# Patient Record
Sex: FEMALE | Race: WHITE | NOT HISPANIC OR LATINO
[De-identification: names, ages, dates, MRNs, and addresses within clinical notes are randomized per-mention and may not be internally consistent; named-entity substitution may affect disease eponyms.]

---

## 2017-03-12 ENCOUNTER — APPOINTMENT (OUTPATIENT)
Dept: MRI IMAGING | Facility: CLINIC | Age: 77
End: 2017-03-12

## 2017-03-12 ENCOUNTER — OUTPATIENT (OUTPATIENT)
Dept: OUTPATIENT SERVICES | Facility: HOSPITAL | Age: 77
LOS: 1 days | End: 2017-03-12
Payer: COMMERCIAL

## 2017-03-12 DIAGNOSIS — R25.1 TREMOR, UNSPECIFIED: ICD-10-CM

## 2017-03-12 DIAGNOSIS — R27.0 ATAXIA, UNSPECIFIED: ICD-10-CM

## 2017-03-12 PROCEDURE — 70551 MRI BRAIN STEM W/O DYE: CPT

## 2017-03-20 ENCOUNTER — OUTPATIENT (OUTPATIENT)
Dept: OUTPATIENT SERVICES | Facility: HOSPITAL | Age: 77
LOS: 1 days | End: 2017-03-20
Payer: COMMERCIAL

## 2017-03-20 ENCOUNTER — APPOINTMENT (OUTPATIENT)
Dept: ULTRASOUND IMAGING | Facility: CLINIC | Age: 77
End: 2017-03-20

## 2017-03-20 DIAGNOSIS — Z00.8 ENCOUNTER FOR OTHER GENERAL EXAMINATION: ICD-10-CM

## 2017-03-20 PROCEDURE — 93975 VASCULAR STUDY: CPT

## 2017-06-12 NOTE — ASU PATIENT PROFILE, ADULT - PMH
Glaucoma    HLD (hyperlipidemia)    HTN (hypertension)    Hypothyroid    LBP (low back pain)    Osteoarthritis of hip  right

## 2017-06-13 ENCOUNTER — OUTPATIENT (OUTPATIENT)
Dept: OUTPATIENT SERVICES | Facility: HOSPITAL | Age: 77
LOS: 1 days | End: 2017-06-13
Payer: MEDICARE

## 2017-06-13 ENCOUNTER — TRANSCRIPTION ENCOUNTER (OUTPATIENT)
Age: 77
End: 2017-06-13

## 2017-06-13 VITALS
RESPIRATION RATE: 16 BRPM | SYSTOLIC BLOOD PRESSURE: 116 MMHG | HEART RATE: 94 BPM | OXYGEN SATURATION: 98 % | DIASTOLIC BLOOD PRESSURE: 51 MMHG

## 2017-06-13 VITALS
SYSTOLIC BLOOD PRESSURE: 128 MMHG | HEART RATE: 79 BPM | WEIGHT: 171.08 LBS | HEIGHT: 62 IN | TEMPERATURE: 99 F | DIASTOLIC BLOOD PRESSURE: 70 MMHG | OXYGEN SATURATION: 96 % | RESPIRATION RATE: 16 BRPM

## 2017-06-13 DIAGNOSIS — Z96.641 PRESENCE OF RIGHT ARTIFICIAL HIP JOINT: Chronic | ICD-10-CM

## 2017-06-13 DIAGNOSIS — H25.21 AGE-RELATED CATARACT, MORGAGNIAN TYPE, RIGHT EYE: ICD-10-CM

## 2017-06-13 PROCEDURE — 66984 XCAPSL CTRC RMVL W/O ECP: CPT | Mod: RT

## 2017-06-13 PROCEDURE — C1780: CPT

## 2017-06-22 ENCOUNTER — APPOINTMENT (OUTPATIENT)
Dept: ORTHOPEDIC SURGERY | Facility: CLINIC | Age: 77
End: 2017-06-22

## 2017-06-22 VITALS
HEIGHT: 61 IN | HEART RATE: 89 BPM | DIASTOLIC BLOOD PRESSURE: 89 MMHG | WEIGHT: 170 LBS | SYSTOLIC BLOOD PRESSURE: 141 MMHG | BODY MASS INDEX: 32.1 KG/M2

## 2017-06-22 DIAGNOSIS — Z96.641 PRESENCE OF RIGHT ARTIFICIAL HIP JOINT: ICD-10-CM

## 2017-06-22 DIAGNOSIS — M54.16 RADICULOPATHY, LUMBAR REGION: ICD-10-CM

## 2017-06-22 RX ORDER — TRETINOIN 0.2 MG/G
0.02 CREAM TOPICAL DAILY
Qty: 40 | Refills: 0 | Status: ACTIVE | COMMUNITY
Start: 2017-06-22 | End: 1900-01-01

## 2017-07-24 ENCOUNTER — APPOINTMENT (OUTPATIENT)
Dept: PEDIATRIC ALLERGY IMMUNOLOGY | Facility: CLINIC | Age: 77
End: 2017-07-24

## 2019-04-08 ENCOUNTER — TRANSCRIPTION ENCOUNTER (OUTPATIENT)
Age: 79
End: 2019-04-08

## 2019-04-09 ENCOUNTER — OUTPATIENT (OUTPATIENT)
Dept: OUTPATIENT SERVICES | Facility: HOSPITAL | Age: 79
LOS: 1 days | End: 2019-04-09
Payer: MEDICARE

## 2019-04-09 VITALS
WEIGHT: 161.38 LBS | DIASTOLIC BLOOD PRESSURE: 66 MMHG | RESPIRATION RATE: 17 BRPM | OXYGEN SATURATION: 95 % | SYSTOLIC BLOOD PRESSURE: 125 MMHG | HEART RATE: 72 BPM | HEIGHT: 61 IN | TEMPERATURE: 98 F

## 2019-04-09 VITALS
HEART RATE: 78 BPM | OXYGEN SATURATION: 98 % | DIASTOLIC BLOOD PRESSURE: 65 MMHG | RESPIRATION RATE: 17 BRPM | SYSTOLIC BLOOD PRESSURE: 127 MMHG

## 2019-04-09 DIAGNOSIS — H25.22 AGE-RELATED CATARACT, MORGAGNIAN TYPE, LEFT EYE: ICD-10-CM

## 2019-04-09 DIAGNOSIS — Z96.641 PRESENCE OF RIGHT ARTIFICIAL HIP JOINT: Chronic | ICD-10-CM

## 2019-04-09 DIAGNOSIS — Z98.41 CATARACT EXTRACTION STATUS, RIGHT EYE: Chronic | ICD-10-CM

## 2019-04-09 PROCEDURE — V2632: CPT

## 2019-04-09 PROCEDURE — 66984 XCAPSL CTRC RMVL W/O ECP: CPT | Mod: LT

## 2019-04-09 NOTE — ASU DISCHARGE PLAN (ADULT/PEDIATRIC) - CALL YOUR DOCTOR IF YOU HAVE ANY OF THE FOLLOWING:
Bleeding that does not stop/Nausea and vomiting that does not stop/Pain not relieved by Medications/Swelling that gets worse/Fever greater than (need to indicate Fahrenheit or Celsius)

## 2019-04-09 NOTE — ASU DISCHARGE PLAN (ADULT/PEDIATRIC) - PATIENT EDUCATION MATERIALS PROVIED
Intraocular lens implant (IOL) , Eye shield instructions and eye kit given to patient/Implant card (specify)/Other (specify)

## 2019-04-09 NOTE — ASU DISCHARGE PLAN (ADULT/PEDIATRIC) - NURSING INSTRUCTIONS
Do not rub the eye. Tylenol or extra strength tylenol if needed for discomfort, avoid   Advil, Motrin, Aleve and aspirin to minimize bleeding.  Appointment with Dr. Pichardo on 4/10/19 @ 9:30am

## 2021-03-03 ENCOUNTER — INPATIENT (INPATIENT)
Facility: HOSPITAL | Age: 81
LOS: 3 days | Discharge: HOME | DRG: 897 | End: 2021-03-07
Attending: INTERNAL MEDICINE | Admitting: INTERNAL MEDICINE
Payer: MEDICARE

## 2021-03-03 VITALS
DIASTOLIC BLOOD PRESSURE: 65 MMHG | RESPIRATION RATE: 18 BRPM | HEIGHT: 61 IN | HEART RATE: 71 BPM | SYSTOLIC BLOOD PRESSURE: 105 MMHG | OXYGEN SATURATION: 98 % | WEIGHT: 169.09 LBS | TEMPERATURE: 97 F

## 2021-03-03 DIAGNOSIS — M16.9 OSTEOARTHRITIS OF HIP, UNSPECIFIED: ICD-10-CM

## 2021-03-03 DIAGNOSIS — Z96.641 PRESENCE OF RIGHT ARTIFICIAL HIP JOINT: Chronic | ICD-10-CM

## 2021-03-03 DIAGNOSIS — F10.94 ALCOHOL USE, UNSPECIFIED WITH ALCOHOL-INDUCED MOOD DISORDER: ICD-10-CM

## 2021-03-03 DIAGNOSIS — I10 ESSENTIAL (PRIMARY) HYPERTENSION: ICD-10-CM

## 2021-03-03 DIAGNOSIS — F10.982 ALCOHOL USE, UNSPECIFIED WITH ALCOHOL-INDUCED SLEEP DISORDER: ICD-10-CM

## 2021-03-03 DIAGNOSIS — F10.10 ALCOHOL ABUSE, UNCOMPLICATED: ICD-10-CM

## 2021-03-03 DIAGNOSIS — E78.2 MIXED HYPERLIPIDEMIA: ICD-10-CM

## 2021-03-03 DIAGNOSIS — F10.239 ALCOHOL DEPENDENCE WITH WITHDRAWAL, UNSPECIFIED: ICD-10-CM

## 2021-03-03 DIAGNOSIS — F32.9 MAJOR DEPRESSIVE DISORDER, SINGLE EPISODE, UNSPECIFIED: ICD-10-CM

## 2021-03-03 DIAGNOSIS — Z98.41 CATARACT EXTRACTION STATUS, RIGHT EYE: Chronic | ICD-10-CM

## 2021-03-03 DIAGNOSIS — E03.9 HYPOTHYROIDISM, UNSPECIFIED: ICD-10-CM

## 2021-03-03 DIAGNOSIS — H40.1134 PRIMARY OPEN-ANGLE GLAUCOMA, BILATERAL, INDETERMINATE STAGE: ICD-10-CM

## 2021-03-03 LAB
ALBUMIN SERPL ELPH-MCNC: 3.7 G/DL — SIGNIFICANT CHANGE UP (ref 3.3–5)
ALP SERPL-CCNC: 117 U/L — SIGNIFICANT CHANGE UP (ref 40–120)
ALT FLD-CCNC: 42 U/L — SIGNIFICANT CHANGE UP (ref 10–45)
ANION GAP SERPL CALC-SCNC: 27 MMOL/L — HIGH (ref 5–17)
APTT BLD: 21.2 SEC — LOW (ref 27.5–35.5)
AST SERPL-CCNC: 107 U/L — HIGH (ref 10–40)
BASOPHILS # BLD AUTO: 0.03 K/UL — SIGNIFICANT CHANGE UP (ref 0–0.2)
BASOPHILS NFR BLD AUTO: 0.6 % — SIGNIFICANT CHANGE UP (ref 0–2)
BILIRUB SERPL-MCNC: 1.2 MG/DL — SIGNIFICANT CHANGE UP (ref 0.2–1.2)
BUN SERPL-MCNC: 18 MG/DL — SIGNIFICANT CHANGE UP (ref 7–23)
CALCIUM SERPL-MCNC: 9.6 MG/DL — SIGNIFICANT CHANGE UP (ref 8.4–10.5)
CHLORIDE SERPL-SCNC: 92 MMOL/L — LOW (ref 96–108)
CO2 SERPL-SCNC: 16 MMOL/L — LOW (ref 22–31)
CREAT SERPL-MCNC: 1.06 MG/DL — SIGNIFICANT CHANGE UP (ref 0.5–1.3)
EOSINOPHIL # BLD AUTO: 0.04 K/UL — SIGNIFICANT CHANGE UP (ref 0–0.5)
EOSINOPHIL NFR BLD AUTO: 0.8 % — SIGNIFICANT CHANGE UP (ref 0–6)
ETHANOL SERPL-MCNC: SIGNIFICANT CHANGE UP MG/DL (ref 0–10)
GAS PNL BLDV: SIGNIFICANT CHANGE UP
GLUCOSE SERPL-MCNC: 122 MG/DL — HIGH (ref 70–99)
HCT VFR BLD CALC: 37.7 % — SIGNIFICANT CHANGE UP (ref 34.5–45)
HGB BLD-MCNC: 12.7 G/DL — SIGNIFICANT CHANGE UP (ref 11.5–15.5)
IMM GRANULOCYTES NFR BLD AUTO: 0.2 % — SIGNIFICANT CHANGE UP (ref 0–1.5)
INR BLD: 0.93 RATIO — SIGNIFICANT CHANGE UP (ref 0.88–1.16)
LYMPHOCYTES # BLD AUTO: 1.33 K/UL — SIGNIFICANT CHANGE UP (ref 1–3.3)
LYMPHOCYTES # BLD AUTO: 27.9 % — SIGNIFICANT CHANGE UP (ref 13–44)
MAGNESIUM SERPL-MCNC: 2.1 MG/DL — SIGNIFICANT CHANGE UP (ref 1.6–2.6)
MCHC RBC-ENTMCNC: 33.7 GM/DL — SIGNIFICANT CHANGE UP (ref 32–36)
MCHC RBC-ENTMCNC: 35.2 PG — HIGH (ref 27–34)
MCV RBC AUTO: 104.4 FL — HIGH (ref 80–100)
MONOCYTES # BLD AUTO: 0.5 K/UL — SIGNIFICANT CHANGE UP (ref 0–0.9)
MONOCYTES NFR BLD AUTO: 10.5 % — SIGNIFICANT CHANGE UP (ref 2–14)
NEUTROPHILS # BLD AUTO: 2.86 K/UL — SIGNIFICANT CHANGE UP (ref 1.8–7.4)
NEUTROPHILS NFR BLD AUTO: 60 % — SIGNIFICANT CHANGE UP (ref 43–77)
NRBC # BLD: 0 /100 WBCS — SIGNIFICANT CHANGE UP (ref 0–0)
PHOSPHATE SERPL-MCNC: 2.2 MG/DL — LOW (ref 2.5–4.5)
PLATELET # BLD AUTO: SIGNIFICANT CHANGE UP K/UL (ref 150–400)
POTASSIUM SERPL-MCNC: 3.5 MMOL/L — SIGNIFICANT CHANGE UP (ref 3.5–5.3)
POTASSIUM SERPL-SCNC: 3.5 MMOL/L — SIGNIFICANT CHANGE UP (ref 3.5–5.3)
PROT SERPL-MCNC: 6.2 G/DL — SIGNIFICANT CHANGE UP (ref 6–8.3)
PROTHROM AB SERPL-ACNC: 11.2 SEC — SIGNIFICANT CHANGE UP (ref 10.6–13.6)
RBC # BLD: 3.61 M/UL — LOW (ref 3.8–5.2)
RBC # FLD: 13.9 % — SIGNIFICANT CHANGE UP (ref 10.3–14.5)
SARS-COV-2 RNA SPEC QL NAA+PROBE: SIGNIFICANT CHANGE UP
SODIUM SERPL-SCNC: 135 MMOL/L — SIGNIFICANT CHANGE UP (ref 135–145)
WBC # BLD: 4.77 K/UL — SIGNIFICANT CHANGE UP (ref 3.8–10.5)
WBC # FLD AUTO: 4.77 K/UL — SIGNIFICANT CHANGE UP (ref 3.8–10.5)

## 2021-03-03 PROCEDURE — 71045 X-RAY EXAM CHEST 1 VIEW: CPT | Mod: 26

## 2021-03-03 PROCEDURE — 93010 ELECTROCARDIOGRAM REPORT: CPT

## 2021-03-03 PROCEDURE — 99285 EMERGENCY DEPT VISIT HI MDM: CPT

## 2021-03-03 PROCEDURE — 70450 CT HEAD/BRAIN W/O DYE: CPT | Mod: 26,MA

## 2021-03-03 PROCEDURE — 99223 1ST HOSP IP/OBS HIGH 75: CPT

## 2021-03-03 PROCEDURE — 72170 X-RAY EXAM OF PELVIS: CPT | Mod: 26

## 2021-03-03 RX ORDER — ATORVASTATIN CALCIUM 80 MG/1
40 TABLET, FILM COATED ORAL AT BEDTIME
Refills: 0 | Status: DISCONTINUED | OUTPATIENT
Start: 2021-03-03 | End: 2021-03-07

## 2021-03-03 RX ORDER — CELECOXIB 200 MG/1
200 CAPSULE ORAL DAILY
Refills: 0 | Status: DISCONTINUED | OUTPATIENT
Start: 2021-03-03 | End: 2021-03-04

## 2021-03-03 RX ORDER — SODIUM CHLORIDE 9 MG/ML
1000 INJECTION, SOLUTION INTRAVENOUS
Refills: 0 | Status: COMPLETED | OUTPATIENT
Start: 2021-03-03 | End: 2021-03-03

## 2021-03-03 RX ORDER — SODIUM CHLORIDE 9 MG/ML
1000 INJECTION, SOLUTION INTRAVENOUS
Refills: 0 | Status: DISCONTINUED | OUTPATIENT
Start: 2021-03-03 | End: 2021-03-03

## 2021-03-03 RX ORDER — FOLIC ACID 0.8 MG
1 TABLET ORAL DAILY
Refills: 0 | Status: DISCONTINUED | OUTPATIENT
Start: 2021-03-03 | End: 2021-03-07

## 2021-03-03 RX ORDER — LEVOTHYROXINE SODIUM 125 MCG
50 TABLET ORAL DAILY
Refills: 0 | Status: DISCONTINUED | OUTPATIENT
Start: 2021-03-03 | End: 2021-03-07

## 2021-03-03 RX ORDER — THIAMINE MONONITRATE (VIT B1) 100 MG
100 TABLET ORAL ONCE
Refills: 0 | Status: COMPLETED | OUTPATIENT
Start: 2021-03-03 | End: 2021-03-03

## 2021-03-03 RX ORDER — PANTOPRAZOLE SODIUM 20 MG/1
40 TABLET, DELAYED RELEASE ORAL
Refills: 0 | Status: DISCONTINUED | OUTPATIENT
Start: 2021-03-03 | End: 2021-03-07

## 2021-03-03 RX ORDER — ASPIRIN/CALCIUM CARB/MAGNESIUM 324 MG
81 TABLET ORAL DAILY
Refills: 0 | Status: DISCONTINUED | OUTPATIENT
Start: 2021-03-03 | End: 2021-03-04

## 2021-03-03 RX ORDER — SENNA PLUS 8.6 MG/1
2 TABLET ORAL AT BEDTIME
Refills: 0 | Status: DISCONTINUED | OUTPATIENT
Start: 2021-03-03 | End: 2021-03-07

## 2021-03-03 RX ORDER — FOLIC ACID 0.8 MG
1 TABLET ORAL ONCE
Refills: 0 | Status: COMPLETED | OUTPATIENT
Start: 2021-03-03 | End: 2021-03-03

## 2021-03-03 RX ADMIN — Medication 1 MILLIGRAM(S): at 16:45

## 2021-03-03 RX ADMIN — SODIUM CHLORIDE 100 MILLILITER(S): 9 INJECTION, SOLUTION INTRAVENOUS at 14:10

## 2021-03-03 RX ADMIN — ATORVASTATIN CALCIUM 40 MILLIGRAM(S): 80 TABLET, FILM COATED ORAL at 22:26

## 2021-03-03 RX ADMIN — Medication 100 MILLIGRAM(S): at 14:10

## 2021-03-03 RX ADMIN — Medication 1 MILLIGRAM(S): at 14:10

## 2021-03-03 RX ADMIN — SENNA PLUS 2 TABLET(S): 8.6 TABLET ORAL at 22:26

## 2021-03-03 RX ADMIN — Medication 100 MILLIGRAM(S): at 15:11

## 2021-03-03 NOTE — ED BEHAVIORAL HEALTH ASSESSMENT NOTE - DESCRIPTION
Pt cooperative and in behavioral control.  Vital Signs Last 24 Hrs  T(C): 36.8 (03 Mar 2021 15:55), Max: 36.8 (03 Mar 2021 13:04)  T(F): 98.3 (03 Mar 2021 15:55), Max: 98.3 (03 Mar 2021 15:55)  HR: 81 (03 Mar 2021 15:55) (71 - 81)  BP: 111/65 (03 Mar 2021 15:55) (105/65 - 145/82)  BP(mean): --  RR: 18 (03 Mar 2021 15:55) (18 - 18)  SpO2: 98% (03 Mar 2021 15:55) (96% - 98%) HTN, HLD, NAFLD, hypothyroidism. Lives with , has two daughter, grandchildren. Retired . Used to play a lot of tennis, golf.

## 2021-03-03 NOTE — ED ADULT NURSE NOTE - NSIMPLEMENTINTERV_GEN_ALL_ED
Implemented All Fall with Harm Risk Interventions:  [place] to call system. Call bell, personal items and telephone within reach. Instruct patient to call for assistance. Room bathroom lighting operational. Non-slip footwear when patient is off stretcher. Physically safe environment: no spills, clutter or unnecessary equipment. Stretcher in lowest position, wheels locked, appropriate side rails in place. Provide visual cue, wrist band, yellow gown, etc. Monitor gait and stability. Monitor for mental status changes and reorient to person, place, and time. Review medications for side effects contributing to fall risk. Reinforce activity limits and safety measures with patient and family. Provide visual clues: red socks.

## 2021-03-03 NOTE — ED PROVIDER NOTE - CLINICAL SUMMARY MEDICAL DECISION MAKING FREE TEXT BOX
81f presenting for eval of possible withdrawal + weakness, is followed outpatient by dr roberts and was at his office today when discussing alcohol abuse, subsequently sent here for eval of near syncopal episode, states that she has been drinking a glass of vodka daily, last drink either last night or this am, on exam hr normal and bp normal but historically on ccb suspect is having withdrawal given anxiety + uncomfortable appearance and complaints, will start ciwa, obtian labs / imaging, give benzos to control sx, tba.

## 2021-03-03 NOTE — ED ADULT NURSE REASSESSMENT NOTE - NS ED NURSE REASSESS COMMENT FT1
Medication not available in pyxis. Spoke with pharmacist who states medication will be sent to tube # [phone]. Awaiting medication arrival.

## 2021-03-03 NOTE — ED BEHAVIORAL HEALTH ASSESSMENT NOTE - SUMMARY
82yo woman, , domiciled with , retired , has children, hx of depression, no prior psych admission or outpt psychiatrist, PMH HTN, HLD, hypothryoidism, NAFLD, no prior SIB/SA, no prior legal or violence hx, BIB EMS per PMD Dr. Steven's concerns for alcohol withdrawal, recent falls, pt now being admitted for alcohol withdrawal and falls. Consult was requested for alcohol withdrawal, depression.    Pt minimizing alcohol abuse. Per , pt's day to day life is significantly impacted by alcohol use and has led to serious health consequences. Currently tremulous, in withdrawal. Possible depression, anxiety related to alcohol use but cannot rule out primary psychiatric disorders.    - Symptom-triggered CIWA protocol with 2mg Ativan  - Trazodone 50mg qHS  - Vitamin repletion as approriate for B12, folate, thiamine  - Psych will continue to follow  - Hope to motivate patient to consider alcohol substance treatment, will involve social work if pt commits

## 2021-03-03 NOTE — ED BEHAVIORAL HEALTH ASSESSMENT NOTE - NSBHSACONSEQUENCE_PSY_A_CORE FT
No prior withdrawal, hospitalization, seizure. No prior substance treatment. Has been tapered on Ativan by PMD in the past.

## 2021-03-03 NOTE — H&P ADULT - HISTORY OF PRESENT ILLNESS
80 y/o F pmhx htn, hld, hypothyroidism, anxiety, NAFLD, EtOH abuse, depression, presenting BIBEMS from her PMD office Dr. Steven today after presenting there for concern for 'wanting to stop drinking.' She reports that she has been drinking approximately 12-14oz of vodka per day since the beginning of the pandemic. Has suffered from alcoholism for many years prior to that, though stopped for along time. She reports that she has been having some falls while intoxicated but unclear if she ever hit her head or lost consciousness. Today went to Dr. Steven's office to discuss with him that she feels that she really wants to stop drinking; however, she informed him of the fact that she has had some falls, and he found her BP to be low in the office so sent her in. She reports that she is still eating and drinking but not as much as previously and not as much as she knows she should. She denies any pain anywhere. Denies infectious symptoms, cough, chest pain, shortness of breath, weakness. States her last drink was yesterday afternoon.

## 2021-03-03 NOTE — ED ADULT NURSE NOTE - OBJECTIVE STATEMENT
80yo F aaox4 Hx HTN, HLD, alcohol abuse, presents to ED from PCP office s/p hypotensive episode and alcohol abuse, as per EMS pt went to PCP because her abuse of alcohol was increased, her  was concern and took her to the PCP, while the pt was on the office she had x1 episode of hypotensive   (80/60) then MD advice and called the EMS to came to Ed for evaluation, as per pt she was drinking (vodka) for the last 50 years , increasing for the "pandemia", also c/o decreased PO intake multiple falls. Last drink was last night at diner time. At this time Pt denies CP, SOB, HA, vision changes, n/v/d, fevers chills, abdominal pain, weakness, dizziness, Gu symptoms. Safety and comfort measures initiated- bed placed in lowest position and side rails raised. Pt oriented to call bell system.

## 2021-03-03 NOTE — ED BEHAVIORAL HEALTH ASSESSMENT NOTE - OTHER
[facility] ref #[id number] - no controlled prescriptions in the past year [place] welcoming, warm. [id number]

## 2021-03-03 NOTE — ED PROVIDER NOTE - NS ED ROS FT
Constitutional: No fever or chills +anxious  Eyes: No visual changes, eye pain or redness  HEENT: No throat pain, ear pain, nasal pain. No nose bleeding.  CV: No chest pain or lower extremity edema  Resp: No SOB no cough  GI: No abd pain. No nausea or vomiting. No diarrhea. No constipation.   : No dysuria, hematuria.   MSK: No musculoskeletal pain  Skin: No rash  Neuro: No headache. No numbness or tingling. No weakness.

## 2021-03-03 NOTE — PATIENT PROFILE ADULT - VISION (WITH CORRECTIVE LENSES IF THE PATIENT USUALLY WEARS THEM):
Partially impaired: cannot see medication labels or newsprint, but can see obstacles in path, and the surrounding layout; can count fingers at arm's length pt has glaucoma/Partially impaired: cannot see medication labels or newsprint, but can see obstacles in path, and the surrounding layout; can count fingers at arm's length

## 2021-03-03 NOTE — ED BEHAVIORAL HEALTH ASSESSMENT NOTE - RISK ASSESSMENT
Chronic risk factors include hx of alcohol use disorder, medical comorbidities.  Acute risk factors include acute medical illness with alcohol withdrawal and weakness, social isolation.  Protective factors include good social support, , has children, denying S/H I/I/P, no prior SIB/SA, no prior psychiatric admissions, identifies reasons for living, female gender, no access to guns/weapons.  Pt is not at imminent, acute risk for harm and does not meet criteria for psychiatric admission for safety. Low Acute Suicide Risk

## 2021-03-03 NOTE — ED BEHAVIORAL HEALTH ASSESSMENT NOTE - DIFFERENTIAL
Alcohol withdrawal, Without perceptual disturbances  Alcohol use disorder, Severe  Alcohol-induced sleep disorder, With moderate or severe use disorder  Alcohol-induced anxiety disorder, With moderate or severe use disorder  Alcohol-induced depressive disorder, With moderate or severe use disorder

## 2021-03-03 NOTE — ED BEHAVIORAL HEALTH ASSESSMENT NOTE - CASE SUMMARY
This is an 81-y.o. CF patient, , domiciled with , retired , has children, hx of depression, no prior psych admission or outpt psychiatrist, PMH HTN, HLD, hypothryoidism, NAFLD, no prior SIB/SA, no prior legal or violence hx, BIB EMS per PMD Dr. Steven's concerns for alcohol withdrawal, recent falls, pt now being admitted for alcohol withdrawal and falls. Consult was requested for alcohol withdrawal, depression.    I have seen and evaluated this patient myself. Chart, labs, meds reviewed. I agree with resident's assessment and plan.

## 2021-03-03 NOTE — PROVIDER CONTACT NOTE (OTHER) - REASON
pt in need of correct CIWA order set. pt needs to be distinguished as high or low risk CIWA. pt's most recent score is 1.

## 2021-03-03 NOTE — ED PROVIDER NOTE - PROGRESS NOTE DETAILS
Spoke with Dr. Steven who feels patient should be admitted based on concern for withdrawal, multiple falls and concern for safety at home. Admits to Dr. Ruiz. Requesting a psych consult which is placed. -Mariya Maciel PA-C spoke to hospitalist Dr. Leiva to confirm patient does not go to hospitalist if she is maintained on CIWA protocol and she stated that if PMD requested Dr. Ruiz's service it goes to his service, will admit patient to Dr. Beltran. -Mariya Maciel PA-C

## 2021-03-03 NOTE — ED PROVIDER NOTE - OBJECTIVE STATEMENT
82 y/o F pmhx htn, hld, hypothyroidism, anxiety, NAFLD, EtOH abuse, depression, presenting BIBEMS from her PMD office Dr. Steven today after presenting there for concern for 'wanting to stop drinking.' She reports that she has been drinking approximately 12-14oz of vodka per day since the beginning of the pandemic. Has suffered from alcoholism for many years prior to that, though stopped for along time. She reports that she has been having some falls while intoxicated but unclear if she ever hit her head or lost consciousness. Today went to Dr. Steven's office to discuss with him that she feels that she really wants to stop drinking; however, she informed him of the fact that she has had some falls, and he found her BP to be low in the office so sent her in. She reports that she is still eating and drinking but not as much as previously and not as much as she knows she should. She denies any pain anywhere. Denies infectious symptoms, cough, chest pain, shortness of breath, weakness. States her last drink was yesterday afternoon.

## 2021-03-03 NOTE — ED BEHAVIORAL HEALTH ASSESSMENT NOTE - NSSUICPROTFACT_PSY_ALL_CORE
Responsibility to children, family, or others/Identifies reasons for living/Supportive social network of family or friends/Positive therapeutic relationships

## 2021-03-03 NOTE — H&P ADULT - NSHPREVIEWOFSYSTEMS_GEN_ALL_CORE
· Review of Systems:                 Constitutional: No fever or chills +anxious (+) shakiness in legs  	Eyes: No visual changes, eye pain or redness  	HEENT: No throat pain, ear pain, nasal pain. No nose bleeding.  	CV: No chest pain or lower extremity edema  	Resp: No SOB no cough  	GI: No abd pain. No nausea or vomiting. No diarrhea. No constipation.   	: No dysuria, hematuria.   	MSK: No musculoskeletal pain  	Skin: No rash  Neuro: No headache. No numbness or tingling. No weakness.

## 2021-03-03 NOTE — H&P ADULT - NSHPPHYSICALEXAM_GEN_ALL_CORE
VITAL SIGNS ([place]):    ,,ED ADULT Flow Sheet:    03-Mar-2021 12:26  · Temp (F): 97.4  · Temp (C) Temp (C): 36.3  · Temp site Temp Site: oral  · Heart Rate Heart Rate (beats/min): 71  · BP Systolic Systolic: 105  · BP Diastolic Diastolic (mm Hg): 65  · Respiration Rate (breaths/min) Respiration Rate (breaths/min): 18  · SpO2 (%) SpO2 (%): 98  · O2 Delivery/Oxygen Delivery Method Patient On (Patient Delivery Method): room air  · How was the weight captured? Weight Type/Method: stated  · Dosing Weight (KILOGRAMS) Dosing Weight (KILOGRAMS): 76.7  · Dosing Weight  (POUNDS) Dosing Weight (POUNDS): 169  · Height type Height Type: stated  · Height (FEET) Height (FEET): 5  · Height (INCHES) Height (INCHES): 1  · Height (CENTIMETERS) Height (CENTIMETERS): 154.94  · BSA (m2): 1.76  · BMI (kG/m2) BMI (kG/m2): 31.9  · Ideal Body Weight(kg) Ideal Body Weight(kg): 48  · Presence of Pain: denies pain/discomfort  · Pain Rating (0-10): Rest: 0  · Pain Rating (0-10): Activity: 0  · SpO2 (%) SpO2 (%): 98           Physical Examination: GEN: anxious appearing, Nontoxic, NAD  	HEENT: NC/AT, Symm Facies. PERRL, EOMI, MMM, posterior pharynx clear  	CV: No JVD/Bruits or stridor;  +S1S2, RRR w/o m/g/r  	RESP: CTAB w/o w/r/r  	ABD: Soft, nt/nd, +BS. No guarding/rebound. No RUQ tender, no CVAT  	EXT/MSK: No lower extremity edema or calf tenderness. WWP, palpable pulses. FROMx4  	SKIN: No erythema, lesions or rash                Neuro: Grossly intact, AOX3 with normal speech, CN II-XII intact; Sensation intact, motor 5/5 throughout. Gait normal (+) shakiness

## 2021-03-03 NOTE — ED PROVIDER NOTE - PHYSICAL EXAMINATION
GEN: anxious appearing, Nontoxic, NAD  HEENT: NC/AT, Symm Facies. PERRL, EOMI, MMM, posterior pharynx clear  CV: No JVD/Bruits or stridor;  +S1S2, RRR w/o m/g/r  RESP: CTAB w/o w/r/r  ABD: Soft, nt/nd, +BS. No guarding/rebound. No RUQ tender, no CVAT  EXT/MSK: No lower extremity edema or calf tenderness. WWP, palpable pulses. FROMx4  SKIN: No erythema, lesions or rash  Neuro: Grossly intact, AOX3 with normal speech, CN II-XII intact; Sensation intact, motor 5/5 throughout. Gait normal

## 2021-03-03 NOTE — H&P ADULT - NSHPLABSRESULTS_GEN_ALL_CORE
EKG  nsr no acute changes    CBC Full  -  ( 03 Mar 2021 13:18 )  WBC Count : 4.77 K/uL  RBC Count : 3.61 M/uL  Hemoglobin : 12.7 g/dL  Hematocrit : 37.7 %  Platelet Count - Automated : Clumped K/uL  Mean Cell Volume : 104.4 fl  Mean Cell Hemoglobin : 35.2 pg  Mean Cell Hemoglobin Concentration : 33.7 gm/dL  Auto Neutrophil # : 2.86 K/uL  Auto Lymphocyte # : 1.33 K/uL  Auto Monocyte # : 0.50 K/uL  Auto Eosinophil # : 0.04 K/uL  Auto Basophil # : 0.03 K/uL  Auto Neutrophil % : 60.0 %  Auto Lymphocyte % : 27.9 %  Auto Monocyte % : 10.5 %  Auto Eosinophil % : 0.8 %  Auto Basophil % : 0.6 %    03-03    135  |  92<L>  |  18  ----------------------------<  122<H>  3.5   |  16<L>  |  1.06    Ca    9.6      03 Mar 2021 13:18  Phos  2.2     03-03  Mg     2.1     03-03    TPro  6.2  /  Alb  3.7  /  TBili  1.2  /  DBili  0.4<H>  /  AST  107<H>  /  ALT  42  /  AlkPhos  117  03-03    LIVER FUNCTIONS - ( 03 Mar 2021 13:18 )  Alb: 3.7 g/dL / Pro: 6.2 g/dL / ALK PHOS: 117 U/L / ALT: 42 U/L / AST: 107 U/L / GGT: x           PT/INR - ( 03 Mar 2021 13:18 )   PT: 11.2 sec;   INR: 0.93 ratio         PTT - ( 03 Mar 2021 13:18 )  PTT:21.2 sec

## 2021-03-03 NOTE — H&P ADULT - NSICDXPASTMEDICALHX_GEN_ALL_CORE_FT
PAST MEDICAL HISTORY:  Glaucoma     HLD (hyperlipidemia)     HTN (hypertension)     Hypothyroid     LBP (low back pain)     Osteoarthritis of hip right

## 2021-03-03 NOTE — ED BEHAVIORAL HEALTH ASSESSMENT NOTE - HPI (INCLUDE ILLNESS QUALITY, SEVERITY, DURATION, TIMING, CONTEXT, MODIFYING FACTORS, ASSOCIATED SIGNS AND SYMPTOMS)
82yo woman, , domiciled with , retired , has children, hx of depression, no prior psych admission or outpt psychiatrist, PMH HTN, HLD, hypothryoidism, NAFLD, no prior SIB/SA, no prior legal or violence hx, BIB EMS per PMD Dr. Steven's concerns for alcohol withdrawal, recent falls, pt now being admitted for alcohol withdrawal and falls. Consult was requested for alcohol withdrawal, depression.    Upon interview, pt is pleasant and cooperative. She reports she had been drinking 4 shot glasses of vodka per day for at least 6 months, reports drinking started due to isolation and low mood related to the pandemic. She reports having her last drink yesterday afternoon and that she would like to quit, "move forward" and be positive with life. She reports having previous difficulty with alcohol abuse once previously in her life in 2019 but quit and was on a tapering regimen of Ativan prescribed by her PMD. She reports having poor sleep. She denies prior withdrawal symptoms, seizure, or hospitalization. She denies tobacco or other recreational substance use currently or in the past.  She denies depressed mood, anhedonia, AH/VH, feeling unsafe, paranoia, euphoric mood with decreased need for sleep, flight of ideas. She denies S/H I/I/P.    Pt provided permission to contact her  Fernando Durham. Per pt's , pt has been drinking because she is anxious, depressed. She has been drinking for 60 years and has attempted to stop drinking many times in the past but has not been successful. She is a stubborn and reactive person, but she has lately been "blank," more "calm," with no energy. She has become more "dull" and narrowing her scope of interests, where she will go out to. She has also not been eating much, perhaps a couple hundred calories in cashew nuts on a day, has not been hydrating. Her sleep has been very inconsistent. She was found to be B12 depleted at PMD's office, was very weak at PMD's office this morning. She has worse memory but feel it is related to the alcohol. She has been drinking 12 ounces of vodka a day. Her drinking has not gotten worse or better with the pandemic, and she uses the pandemic "as an excuse" to explain her drinking. She was sober from alcohol for sometime 3-4 years ago where she had lorazepam from PMD, but otherwise pt has had no other treatment for her alcohol use. She seems more open to seeing a psychiatrist with this emergency room encounter, thus pt's  feels it is an opportune time to encourage pt to see a psychiatrist or attend substance use treatment going forth. She has not expressed S/H I/I/P to him.

## 2021-03-04 DIAGNOSIS — Z29.9 ENCOUNTER FOR PROPHYLACTIC MEASURES, UNSPECIFIED: ICD-10-CM

## 2021-03-04 DIAGNOSIS — E03.9 HYPOTHYROIDISM, UNSPECIFIED: ICD-10-CM

## 2021-03-04 DIAGNOSIS — Z02.9 ENCOUNTER FOR ADMINISTRATIVE EXAMINATIONS, UNSPECIFIED: ICD-10-CM

## 2021-03-04 LAB
ALBUMIN SERPL ELPH-MCNC: 3.2 G/DL — LOW (ref 3.3–5)
ALP SERPL-CCNC: 94 U/L — SIGNIFICANT CHANGE UP (ref 40–120)
ALT FLD-CCNC: 34 U/L — SIGNIFICANT CHANGE UP (ref 10–45)
ANION GAP SERPL CALC-SCNC: 19 MMOL/L — HIGH (ref 5–17)
AST SERPL-CCNC: 87 U/L — HIGH (ref 10–40)
BASE EXCESS BLDV CALC-SCNC: -2.5 MMOL/L — LOW (ref -2–2)
BILIRUB SERPL-MCNC: 0.7 MG/DL — SIGNIFICANT CHANGE UP (ref 0.2–1.2)
BUN SERPL-MCNC: 15 MG/DL — SIGNIFICANT CHANGE UP (ref 7–23)
CALCIUM SERPL-MCNC: 9 MG/DL — SIGNIFICANT CHANGE UP (ref 8.4–10.5)
CHLORIDE SERPL-SCNC: 98 MMOL/L — SIGNIFICANT CHANGE UP (ref 96–108)
CO2 BLDV-SCNC: 23 MMOL/L — SIGNIFICANT CHANGE UP (ref 22–30)
CO2 SERPL-SCNC: 21 MMOL/L — LOW (ref 22–31)
CREAT SERPL-MCNC: 0.95 MG/DL — SIGNIFICANT CHANGE UP (ref 0.5–1.3)
GAS PNL BLDV: SIGNIFICANT CHANGE UP
GLUCOSE SERPL-MCNC: 76 MG/DL — SIGNIFICANT CHANGE UP (ref 70–99)
HCO3 BLDV-SCNC: 22 MMOL/L — SIGNIFICANT CHANGE UP (ref 21–29)
HCT VFR BLD CALC: 33 % — LOW (ref 34.5–45)
HGB BLD-MCNC: 11 G/DL — LOW (ref 11.5–15.5)
LACTATE BLDV-MCNC: 2.6 MMOL/L — HIGH (ref 0.7–2)
MAGNESIUM SERPL-MCNC: 1.9 MG/DL — SIGNIFICANT CHANGE UP (ref 1.6–2.6)
MCHC RBC-ENTMCNC: 33.3 GM/DL — SIGNIFICANT CHANGE UP (ref 32–36)
MCHC RBC-ENTMCNC: 34.7 PG — HIGH (ref 27–34)
MCV RBC AUTO: 104.1 FL — HIGH (ref 80–100)
NRBC # BLD: 0 /100 WBCS — SIGNIFICANT CHANGE UP (ref 0–0)
PCO2 BLDV: 40 MMHG — SIGNIFICANT CHANGE UP (ref 35–50)
PH BLDV: 7.36 — SIGNIFICANT CHANGE UP (ref 7.35–7.45)
PHOSPHATE SERPL-MCNC: 1.8 MG/DL — LOW (ref 2.5–4.5)
PLATELET # BLD AUTO: SIGNIFICANT CHANGE UP K/UL (ref 150–400)
PO2 BLDV: 116 MMHG — HIGH (ref 25–45)
POTASSIUM SERPL-MCNC: 2.8 MMOL/L — CRITICAL LOW (ref 3.5–5.3)
POTASSIUM SERPL-SCNC: 2.8 MMOL/L — CRITICAL LOW (ref 3.5–5.3)
PROT SERPL-MCNC: 5.4 G/DL — LOW (ref 6–8.3)
RBC # BLD: 3.17 M/UL — LOW (ref 3.8–5.2)
RBC # FLD: 14.1 % — SIGNIFICANT CHANGE UP (ref 10.3–14.5)
SAO2 % BLDV: 98 % — HIGH (ref 67–88)
SARS-COV-2 IGG SERPL QL IA: NEGATIVE — SIGNIFICANT CHANGE UP
SARS-COV-2 IGM SERPL IA-ACNC: 0.07 INDEX — SIGNIFICANT CHANGE UP
SODIUM SERPL-SCNC: 138 MMOL/L — SIGNIFICANT CHANGE UP (ref 135–145)
WBC # BLD: 3.54 K/UL — LOW (ref 3.8–10.5)
WBC # FLD AUTO: 3.54 K/UL — LOW (ref 3.8–10.5)

## 2021-03-04 RX ORDER — TIMOLOL 0.5 %
1 DROPS OPHTHALMIC (EYE)
Refills: 0 | Status: DISCONTINUED | OUTPATIENT
Start: 2021-03-04 | End: 2021-03-07

## 2021-03-04 RX ORDER — LATANOPROST 0.05 MG/ML
1 SOLUTION/ DROPS OPHTHALMIC; TOPICAL AT BEDTIME
Refills: 0 | Status: DISCONTINUED | OUTPATIENT
Start: 2021-03-04 | End: 2021-03-07

## 2021-03-04 RX ORDER — POTASSIUM CHLORIDE 20 MEQ
10 PACKET (EA) ORAL
Refills: 0 | Status: COMPLETED | OUTPATIENT
Start: 2021-03-04 | End: 2021-03-04

## 2021-03-04 RX ORDER — LANOLIN ALCOHOL/MO/W.PET/CERES
5 CREAM (GRAM) TOPICAL AT BEDTIME
Refills: 0 | Status: DISCONTINUED | OUTPATIENT
Start: 2021-03-04 | End: 2021-03-07

## 2021-03-04 RX ORDER — POTASSIUM PHOSPHATE, MONOBASIC POTASSIUM PHOSPHATE, DIBASIC 236; 224 MG/ML; MG/ML
15 INJECTION, SOLUTION INTRAVENOUS ONCE
Refills: 0 | Status: COMPLETED | OUTPATIENT
Start: 2021-03-04 | End: 2021-03-04

## 2021-03-04 RX ORDER — CARVEDILOL PHOSPHATE 80 MG/1
25 CAPSULE, EXTENDED RELEASE ORAL EVERY 12 HOURS
Refills: 0 | Status: DISCONTINUED | OUTPATIENT
Start: 2021-03-04 | End: 2021-03-07

## 2021-03-04 RX ORDER — ENOXAPARIN SODIUM 100 MG/ML
40 INJECTION SUBCUTANEOUS DAILY
Refills: 0 | Status: DISCONTINUED | OUTPATIENT
Start: 2021-03-04 | End: 2021-03-05

## 2021-03-04 RX ORDER — LEVOTHYROXINE SODIUM 125 MCG
1 TABLET ORAL
Qty: 0 | Refills: 0 | DISCHARGE

## 2021-03-04 RX ORDER — POTASSIUM CHLORIDE 20 MEQ
40 PACKET (EA) ORAL ONCE
Refills: 0 | Status: COMPLETED | OUTPATIENT
Start: 2021-03-04 | End: 2021-03-04

## 2021-03-04 RX ORDER — DORZOLAMIDE HYDROCHLORIDE TIMOLOL MALEATE 20; 5 MG/ML; MG/ML
1 SOLUTION/ DROPS OPHTHALMIC
Refills: 0 | Status: DISCONTINUED | OUTPATIENT
Start: 2021-03-04 | End: 2021-03-07

## 2021-03-04 RX ORDER — CARVEDILOL PHOSPHATE 80 MG/1
25 CAPSULE, EXTENDED RELEASE ORAL EVERY 12 HOURS
Refills: 0 | Status: DISCONTINUED | OUTPATIENT
Start: 2021-03-04 | End: 2021-03-04

## 2021-03-04 RX ORDER — THIAMINE MONONITRATE (VIT B1) 100 MG
100 TABLET ORAL DAILY
Refills: 0 | Status: DISCONTINUED | OUTPATIENT
Start: 2021-03-04 | End: 2021-03-07

## 2021-03-04 RX ADMIN — Medication 5 MILLIGRAM(S): at 22:33

## 2021-03-04 RX ADMIN — Medication 100 MILLIEQUIVALENT(S): at 08:42

## 2021-03-04 RX ADMIN — LATANOPROST 1 DROP(S): 0.05 SOLUTION/ DROPS OPHTHALMIC; TOPICAL at 22:33

## 2021-03-04 RX ADMIN — CELECOXIB 200 MILLIGRAM(S): 200 CAPSULE ORAL at 12:14

## 2021-03-04 RX ADMIN — Medication 40 MILLIEQUIVALENT(S): at 08:38

## 2021-03-04 RX ADMIN — ENOXAPARIN SODIUM 40 MILLIGRAM(S): 100 INJECTION SUBCUTANEOUS at 18:50

## 2021-03-04 RX ADMIN — Medication 50 MICROGRAM(S): at 06:44

## 2021-03-04 RX ADMIN — ATORVASTATIN CALCIUM 40 MILLIGRAM(S): 80 TABLET, FILM COATED ORAL at 22:33

## 2021-03-04 RX ADMIN — Medication 81 MILLIGRAM(S): at 12:14

## 2021-03-04 RX ADMIN — PANTOPRAZOLE SODIUM 40 MILLIGRAM(S): 20 TABLET, DELAYED RELEASE ORAL at 06:44

## 2021-03-04 RX ADMIN — Medication 1 TABLET(S): at 12:14

## 2021-03-04 RX ADMIN — SENNA PLUS 2 TABLET(S): 8.6 TABLET ORAL at 22:33

## 2021-03-04 RX ADMIN — POTASSIUM PHOSPHATE, MONOBASIC POTASSIUM PHOSPHATE, DIBASIC 62.5 MILLIMOLE(S): 236; 224 INJECTION, SOLUTION INTRAVENOUS at 08:41

## 2021-03-04 RX ADMIN — CARVEDILOL PHOSPHATE 25 MILLIGRAM(S): 80 CAPSULE, EXTENDED RELEASE ORAL at 18:44

## 2021-03-04 RX ADMIN — Medication 100 MILLIEQUIVALENT(S): at 10:19

## 2021-03-04 RX ADMIN — Medication 1 MILLIGRAM(S): at 12:14

## 2021-03-04 RX ADMIN — Medication 100 MILLIGRAM(S): at 12:16

## 2021-03-04 RX ADMIN — Medication 1 DROP(S): at 17:01

## 2021-03-04 RX ADMIN — DORZOLAMIDE HYDROCHLORIDE TIMOLOL MALEATE 1 DROP(S): 20; 5 SOLUTION/ DROPS OPHTHALMIC at 18:45

## 2021-03-04 RX ADMIN — Medication 100 MILLIEQUIVALENT(S): at 12:13

## 2021-03-04 NOTE — PROGRESS NOTE ADULT - SUBJECTIVE AND OBJECTIVE BOX
PROGRESS NOTE:   Authored by Rl Suazo MD  PGY-1, Internal Medicine  Pager [facility] [phone], [id number]     Patient is a 81y old  Female who presents with a chief complaint of frequent falls and alcohol ism (03 Mar 2021 16:40)      SUBJECTIVE / OVERNIGHT EVENTS: No events overnight.    ADDITIONAL REVIEW OF SYSTEMS: Denies fevers, chills, n/v.    MEDICATIONS  (STANDING):  aspirin enteric coated 81 milliGRAM(s) Oral daily  atorvastatin 40 milliGRAM(s) Oral at bedtime  celecoxib 200 milliGRAM(s) Oral daily  folic acid 1 milliGRAM(s) Oral daily  levothyroxine 50 MICROGram(s) Oral daily  multivitamin 1 Tablet(s) Oral daily  pantoprazole    Tablet 40 milliGRAM(s) Oral before breakfast  senna 2 Tablet(s) Oral at bedtime    MEDICATIONS  (PRN):  LORazepam   Injectable 2 milliGRAM(s) IV Push every 2 hours PRN Symptom-triggered: 2 point increase in CIWA -Ar score and a total score of 7 or LESS      CAPILLARY BLOOD GLUCOSE      POCT Blood Glucose.: 119 mg/dL (03 Mar 2021 12:51)    I&O's Summary      PHYSICAL EXAM:  Vital Signs Last 24 Hrs  T(C): 36.7 (04 Mar 2021 06:45), Max: 36.9 (03 Mar 2021 18:00)  T(F): 98 (04 Mar 2021 06:45), Max: 98.5 (03 Mar 2021 18:00)  HR: 71 (04 Mar 2021 06:45) (71 - 96)  BP: 116/68 (04 Mar 2021 06:45) (105/65 - 145/82)  BP(mean): --  RR: 18 (04 Mar 2021 06:45) (18 - 18)  SpO2: 96% (04 Mar 2021 06:45) (95% - 98%)    CONSTITUTIONAL: NAD, lying in bed comfortably  RESPIRATORY: Normal respiratory effort; CTABL  CARDIOVASCULAR: Regular rate and rhythm, normal S1 and S2, no murmur/rub/gallop  ABDOMEN: Soft, nondistended, nontender to palpation, normoactive bowel sounds, no rebound/guarding  MUSCLOSKELETAL: no joint swelling or tenderness to palpation, FROM all extremities  NEURO: AAOx3 to person, place, and time, full and equal strength all extremities   EXTREMITIES: no pedal edema    LABS:                        11.0   3.54  )-----------( x        ( 04 Mar 2021 07:06 )             33.0     03-03    135  |  92<L>  |  18  ----------------------------<  122<H>  3.5   |  16<L>  |  1.06    Ca    9.6      03 Mar 2021 13:18  Phos  2.2     03-03  Mg     2.1     03-03    TPro  6.2  /  Alb  3.7  /  TBili  1.2  /  DBili  0.4<H>  /  AST  107<H>  /  ALT  42  /  AlkPhos  117  03-03    PT/INR - ( 03 Mar 2021 13:18 )   PT: 11.2 sec;   INR: 0.93 ratio         PTT - ( 03 Mar 2021 13:18 )  PTT:21.2 sec            RADIOLOGY & ADDITIONAL TESTS:     PROGRESS NOTE:   Authored by Rl Suazo MD  PGY-1, Internal Medicine  Pager [facility] [phone], [id number]     Patient is a 81y old  Female who presents with a chief complaint of frequent falls and alcohol ism (03 Mar 2021 16:40)      SUBJECTIVE / OVERNIGHT EVENTS: No events overnight. Patient complaining of inability to sleep due to her neighbor. Patient denies any chest pain, SOB, visual hallucinations, tremors, nausea or vomiting. Patient has no symptoms currently.     ADDITIONAL REVIEW OF SYSTEMS: Denies fevers, chills, n/v.    MEDICATIONS  (STANDING):  aspirin enteric coated 81 milliGRAM(s) Oral daily  atorvastatin 40 milliGRAM(s) Oral at bedtime  celecoxib 200 milliGRAM(s) Oral daily  folic acid 1 milliGRAM(s) Oral daily  levothyroxine 50 MICROGram(s) Oral daily  multivitamin 1 Tablet(s) Oral daily  pantoprazole    Tablet 40 milliGRAM(s) Oral before breakfast  senna 2 Tablet(s) Oral at bedtime    MEDICATIONS  (PRN):  LORazepam   Injectable 2 milliGRAM(s) IV Push every 2 hours PRN Symptom-triggered: 2 point increase in CIWA -Ar score and a total score of 7 or LESS      CAPILLARY BLOOD GLUCOSE      POCT Blood Glucose.: 119 mg/dL (03 Mar 2021 12:51)    I&O's Summary      PHYSICAL EXAM:  Vital Signs Last 24 Hrs  T(C): 36.7 (04 Mar 2021 06:45), Max: 36.9 (03 Mar 2021 18:00)  T(F): 98 (04 Mar 2021 06:45), Max: 98.5 (03 Mar 2021 18:00)  HR: 71 (04 Mar 2021 06:45) (71 - 96)  BP: 116/68 (04 Mar 2021 06:45) (105/65 - 145/82)  BP(mean): --  RR: 18 (04 Mar 2021 06:45) (18 - 18)  SpO2: 96% (04 Mar 2021 06:45) (95% - 98%)    CONSTITUTIONAL: NAD, lying in bed comfortably  RESPIRATORY: Normal respiratory effort; CTABL  CARDIOVASCULAR: Regular rate and rhythm, normal S1 and S2, no murmur/rub/gallop  ABDOMEN: Soft, nondistended, nontender to palpation, normoactive bowel sounds, no rebound/guarding  MUSCLOSKELETAL: no joint swelling or tenderness to palpation, FROM all extremities  NEURO: AAOx3 to person, place, and time, full and equal strength all extremities. No tremors, no tongue fasciculations, PERRLA, EOMI intact.  EXTREMITIES: no pedal edema    LABS:                        11.0   3.54  )-----------( x        ( 04 Mar 2021 07:06 )             33.0     03-03    135  |  92<L>  |  18  ----------------------------<  122<H>  3.5   |  16<L>  |  1.06    Ca    9.6      03 Mar 2021 13:18  Phos  2.2     03-03  Mg     2.1     03-03    TPro  6.2  /  Alb  3.7  /  TBili  1.2  /  DBili  0.4<H>  /  AST  107<H>  /  ALT  42  /  AlkPhos  117  03-03    PT/INR - ( 03 Mar 2021 13:18 )   PT: 11.2 sec;   INR: 0.93 ratio         PTT - ( 03 Mar 2021 13:18 )  PTT:21.2 sec            RADIOLOGY & ADDITIONAL TESTS:

## 2021-03-04 NOTE — PROGRESS NOTE ADULT - PROBLEM SELECTOR PLAN 2
Psych eval and treatment for alcohol withdrawal prevention and depression Low fat low cholesterol diet    - c/w atorvastatin 40mg

## 2021-03-04 NOTE — CHART NOTE - NSCHARTNOTEFT_GEN_A_CORE
Search Terms: Valeria Durham, 1940Search Date: 03/04/2021 16:20:31 PM  Searching on behalf of: [facility]  The Drug Utilization Report below displays all of the controlled substance prescriptions, if any, that your patient has filled in the last twelve months. The information displayed on this report is compiled from pharmacy submissions to the Department, and accurately reflects the information as submitted by the pharmacies.    This report was requested by: Rl Suazo | Reference #: [id number]    There are no results for the search terms that you entered.

## 2021-03-04 NOTE — PHYSICAL THERAPY INITIAL EVALUATION ADULT - PLANNED THERAPY INTERVENTIONS, PT EVAL
1. GOAL: In 2 weeks, pt will be able to navigate 1 flight of stairs independently./bed mobility training/gait training/transfer training

## 2021-03-04 NOTE — PHYSICAL THERAPY INITIAL EVALUATION ADULT - ADDITIONAL COMMENTS
Pt lives in a pvt home w/ spouse, 1 step to enter, 1 flight inside to the second floor. PTA pt reports being independent w/ all functional mobility & did not utilize an AD for ambulation.

## 2021-03-04 NOTE — PROGRESS NOTE ADULT - PROBLEM SELECTOR PLAN 7
r/o worsening  djd  /  trauma from falls DVT ppx: lovenox 40 qd  Diet: DASH diet  Dispo: Pending PT eval

## 2021-03-04 NOTE — PROGRESS NOTE ADULT - PROBLEM SELECTOR PLAN 1
Deborah protocol  follow for signs of withdrawal  Patient states she wants to stop drinking but is not interested in AA

## 2021-03-04 NOTE — PROGRESS NOTE ADULT - SUBJECTIVE AND OBJECTIVE BOX
80 y/o F pmhx htn, hld, hypothyroidism, anxiety, NAFLD, EtOH abuse, depression, presenting BIBEMS from her PMD office Dr. Steven today after presenting there for concern for 'wanting to stop drinking.' She reports that she has been drinking approximately 12-14oz of vodka per day since the beginning of the pandemic. Has suffered from alcoholism for many years prior to that, though stopped for along time. She reports that she has been having some falls while intoxicated but unclear if she ever hit her head or lost consciousness. Today went to Dr. Steven's office to discuss with him that she feels that she really wants to stop drinking; however, she informed him of the fact that she has had some falls, and he found her BP to be low in the office so sent her in. She reports that she is still eating and drinking but not as much as previously and not as much as she knows she should. She denies any pain anywhere. Denies infectious symptoms, cough, chest pain, shortness of breath, weakness. States her last drink was 3/2. Patient seen resting comfortably    MEDICATIONS  (STANDING):  artificial tears (preservative free) Ophthalmic Solution 1 Drop(s) Both EYES four times a day  aspirin enteric coated 81 milliGRAM(s) Oral daily  atorvastatin 40 milliGRAM(s) Oral at bedtime  celecoxib 200 milliGRAM(s) Oral daily  folic acid 1 milliGRAM(s) Oral daily  latanoprost 0.005% Ophthalmic Solution 1 Drop(s) Both EYES at bedtime  levothyroxine 50 MICROGram(s) Oral daily  multivitamin 1 Tablet(s) Oral daily  pantoprazole    Tablet 40 milliGRAM(s) Oral before breakfast  senna 2 Tablet(s) Oral at bedtime  thiamine 100 milliGRAM(s) Oral daily  timolol 0.25% Solution 1 Drop(s) Both EYES two times a day    MEDICATIONS  (PRN):  LORazepam   Injectable 2 milliGRAM(s) IV Push every 2 hours PRN Symptom-triggered: 2 point increase in CIWA -Ar score and a total score of 7 or LESS          VITALS:   T(C): 36.7 (03-04-21 @ 06:45), Max: 36.9 (03-03-21 @ 18:00)  HR: 84 (03-04-21 @ 13:01) (71 - 96)  BP: 132/78 (03-04-21 @ 13:01) (111/65 - 143/72)  RR: 16 (03-04-21 @ 13:01) (16 - 18)  SpO2: 96% (03-04-21 @ 06:45) (95% - 98%)  Wt(kg): --      PHYSICAL EXAM:  GENERAL: NAD, well nourished and conversant  HEAD:  Atraumatic  EYES: EOM, PERRLA, conjunctiva pink and sclera white  ENT: No tonsillar erythema, exudates, or enlargement, moist mucous membranes, good dentition, no lesions  NECK: Supple, No JVD, normal thyroid, carotids with normal upstrokes and no bruits  CHEST/LUNG: Clear to auscultation bilaterally, No rales, rhonchi, wheezing, or rubs  HEART: Regular rate and rhythm, No murmurs, rubs, or gallops  ABDOMEN: Soft, nondistended, no masses, guarding, tenderness or rebound, bowel sounds present  EXTREMITIES:  2+ Peripheral Pulses, No clubbing, cyanosis, or edema.   LYMPH: No lymphadenopathy noted  SKIN: No rashes or lesions  NERVOUS SYSTEM:  Alert & Oriented X3, normal cognitive function. Motor Strength 5/5 right upper and right lower.  5/5 left upper and left lower extremities, DTRs 2+ intact and symmetric  LABS:        CBC Full  -  ( 04 Mar 2021 07:06 )  WBC Count : 3.54 K/uL  RBC Count : 3.17 M/uL  Hemoglobin : 11.0 g/dL  Hematocrit : 33.0 %  Platelet Count - Automated : CLUMPED. K/uL  Mean Cell Volume : 104.1 fl  Mean Cell Hemoglobin : 34.7 pg  Mean Cell Hemoglobin Concentration : 33.3 gm/dL  Auto Neutrophil # : x  Auto Lymphocyte # : x  Auto Monocyte # : x  Auto Eosinophil # : x  Auto Basophil # : x  Auto Neutrophil % : x  Auto Lymphocyte % : x  Auto Monocyte % : x  Auto Eosinophil % : x  Auto Basophil % : x    03-04    138  |  98  |  15  ----------------------------<  76  2.8<LL>   |  21<L>  |  0.95    Ca    9.0      04 Mar 2021 07:03  Phos  1.8     03-04  Mg     1.9     03-04    TPro  5.4<L>  /  Alb  3.2<L>  /  TBili  0.7  /  DBili  x   /  AST  87<H>  /  ALT  34  /  AlkPhos  94  03-04    LIVER FUNCTIONS - ( 04 Mar 2021 07:03 )  Alb: 3.2 g/dL / Pro: 5.4 g/dL / ALK PHOS: 94 U/L / ALT: 34 U/L / AST: 87 U/L / GGT: x           PT/INR - ( 03 Mar 2021 13:18 )   PT: 11.2 sec;   INR: 0.93 ratio         PTT - ( 03 Mar 2021 13:18 )  PTT:21.2 sec    CAPILLARY BLOOD GLUCOSE          RADIOLOGY & ADDITIONAL TESTS:

## 2021-03-04 NOTE — PROGRESS NOTE ADULT - ASSESSMENT
Patient with frequent falls and current alcohol abuse Patient with hx of HTN, HLD, hypothyroidism, anxiety, NAFLD, EtOH abuse, presenting with frequent falls and current alcohol abuse. Patient on symptom- triggered CIWA.

## 2021-03-04 NOTE — PROGRESS NOTE ADULT - PROBLEM SELECTOR PLAN 1
Ciwa protocol [facility] protocol, symptom triggered   - monitor for symptoms of withdrawal  - MV, folate, thiamine, B12

## 2021-03-05 ENCOUNTER — TRANSCRIPTION ENCOUNTER (OUTPATIENT)
Age: 81
End: 2021-03-05

## 2021-03-05 LAB
ALBUMIN SERPL ELPH-MCNC: 3 G/DL — LOW (ref 3.3–5)
ALP SERPL-CCNC: 97 U/L — SIGNIFICANT CHANGE UP (ref 40–120)
ALT FLD-CCNC: 30 U/L — SIGNIFICANT CHANGE UP (ref 10–45)
ANION GAP SERPL CALC-SCNC: 13 MMOL/L — SIGNIFICANT CHANGE UP (ref 5–17)
AST SERPL-CCNC: 79 U/L — HIGH (ref 10–40)
BILIRUB SERPL-MCNC: 0.5 MG/DL — SIGNIFICANT CHANGE UP (ref 0.2–1.2)
BUN SERPL-MCNC: 13 MG/DL — SIGNIFICANT CHANGE UP (ref 7–23)
CALCIUM SERPL-MCNC: 8.7 MG/DL — SIGNIFICANT CHANGE UP (ref 8.4–10.5)
CHLORIDE SERPL-SCNC: 104 MMOL/L — SIGNIFICANT CHANGE UP (ref 96–108)
CO2 SERPL-SCNC: 23 MMOL/L — SIGNIFICANT CHANGE UP (ref 22–31)
CREAT SERPL-MCNC: 0.81 MG/DL — SIGNIFICANT CHANGE UP (ref 0.5–1.3)
FOLATE SERPL-MCNC: 12.6 NG/ML — SIGNIFICANT CHANGE UP
GLUCOSE SERPL-MCNC: 112 MG/DL — HIGH (ref 70–99)
HCT VFR BLD CALC: 33.3 % — LOW (ref 34.5–45)
HGB BLD-MCNC: 11.1 G/DL — LOW (ref 11.5–15.5)
MAGNESIUM SERPL-MCNC: 1.8 MG/DL — SIGNIFICANT CHANGE UP (ref 1.6–2.6)
MCHC RBC-ENTMCNC: 33.3 GM/DL — SIGNIFICANT CHANGE UP (ref 32–36)
MCHC RBC-ENTMCNC: 34.7 PG — HIGH (ref 27–34)
MCV RBC AUTO: 104.1 FL — HIGH (ref 80–100)
NRBC # BLD: 0 /100 WBCS — SIGNIFICANT CHANGE UP (ref 0–0)
PHOSPHATE SERPL-MCNC: 3 MG/DL — SIGNIFICANT CHANGE UP (ref 2.5–4.5)
PLATELET # BLD AUTO: 56 K/UL — LOW (ref 150–400)
POTASSIUM SERPL-MCNC: 3 MMOL/L — LOW (ref 3.5–5.3)
POTASSIUM SERPL-SCNC: 3 MMOL/L — LOW (ref 3.5–5.3)
PROT SERPL-MCNC: 5.1 G/DL — LOW (ref 6–8.3)
RBC # BLD: 3.2 M/UL — LOW (ref 3.8–5.2)
RBC # FLD: 14.2 % — SIGNIFICANT CHANGE UP (ref 10.3–14.5)
SODIUM SERPL-SCNC: 140 MMOL/L — SIGNIFICANT CHANGE UP (ref 135–145)
VIT B12 SERPL-MCNC: 766 PG/ML — SIGNIFICANT CHANGE UP (ref 232–1245)
WBC # BLD: 3.39 K/UL — LOW (ref 3.8–10.5)
WBC # FLD AUTO: 3.39 K/UL — LOW (ref 3.8–10.5)

## 2021-03-05 RX ORDER — THIAMINE MONONITRATE (VIT B1) 100 MG
1 TABLET ORAL
Qty: 0 | Refills: 0 | DISCHARGE
Start: 2021-03-05

## 2021-03-05 RX ORDER — LANOLIN ALCOHOL/MO/W.PET/CERES
1 CREAM (GRAM) TOPICAL
Qty: 0 | Refills: 0 | DISCHARGE
Start: 2021-03-05

## 2021-03-05 RX ORDER — POTASSIUM CHLORIDE 20 MEQ
40 PACKET (EA) ORAL EVERY 4 HOURS
Refills: 0 | Status: COMPLETED | OUTPATIENT
Start: 2021-03-05 | End: 2021-03-05

## 2021-03-05 RX ADMIN — PANTOPRAZOLE SODIUM 40 MILLIGRAM(S): 20 TABLET, DELAYED RELEASE ORAL at 06:34

## 2021-03-05 RX ADMIN — Medication 50 MICROGRAM(S): at 06:34

## 2021-03-05 RX ADMIN — Medication 1 DROP(S): at 18:02

## 2021-03-05 RX ADMIN — Medication 40 MILLIEQUIVALENT(S): at 14:40

## 2021-03-05 RX ADMIN — Medication 1 DROP(S): at 06:36

## 2021-03-05 RX ADMIN — DORZOLAMIDE HYDROCHLORIDE TIMOLOL MALEATE 1 DROP(S): 20; 5 SOLUTION/ DROPS OPHTHALMIC at 06:35

## 2021-03-05 RX ADMIN — CARVEDILOL PHOSPHATE 25 MILLIGRAM(S): 80 CAPSULE, EXTENDED RELEASE ORAL at 18:02

## 2021-03-05 RX ADMIN — DORZOLAMIDE HYDROCHLORIDE TIMOLOL MALEATE 1 DROP(S): 20; 5 SOLUTION/ DROPS OPHTHALMIC at 18:03

## 2021-03-05 RX ADMIN — Medication 5 MILLIGRAM(S): at 21:42

## 2021-03-05 RX ADMIN — Medication 100 MILLIGRAM(S): at 12:37

## 2021-03-05 RX ADMIN — ATORVASTATIN CALCIUM 40 MILLIGRAM(S): 80 TABLET, FILM COATED ORAL at 21:42

## 2021-03-05 RX ADMIN — SENNA PLUS 2 TABLET(S): 8.6 TABLET ORAL at 21:43

## 2021-03-05 RX ADMIN — Medication 1 DROP(S): at 12:38

## 2021-03-05 RX ADMIN — Medication 1 TABLET(S): at 12:38

## 2021-03-05 RX ADMIN — Medication 1 MILLIGRAM(S): at 12:38

## 2021-03-05 RX ADMIN — LATANOPROST 1 DROP(S): 0.05 SOLUTION/ DROPS OPHTHALMIC; TOPICAL at 21:43

## 2021-03-05 RX ADMIN — Medication 40 MILLIEQUIVALENT(S): at 08:36

## 2021-03-05 RX ADMIN — CARVEDILOL PHOSPHATE 25 MILLIGRAM(S): 80 CAPSULE, EXTENDED RELEASE ORAL at 06:34

## 2021-03-05 NOTE — DISCHARGE NOTE PROVIDER - NSDCCPCAREPLAN_GEN_ALL_CORE_FT
PRINCIPAL DISCHARGE DIAGNOSIS  Diagnosis: Alcohol abuse  Assessment and Plan of Treatment: You came to the hospital under the recommendation of your primary care doctor who was concerned about your alcohol use and falls at home. Your falls at home may be related to your alcohol use. You were admitted to the hospital due to concern for possible alcohol withdrawal symptoms. While you were in the hospital, you were on medications to help prevent any withdrawal symptoms but you never developed any of those symptoms and therefore did not require any further doses of ativan. You were also feeling very weak initially, but gradually improved in strength. The physical therapist evaluated you and determined that you may be discharged home with home physical therapy. It is important that you avoid alcohol so that these issues do not happen again. Please follow up with your primary care doctor, Dr. Steven, within 2 weeks upon discharge. Your doctor would be able to provide you with additional resource you need to help with alcohol cessation. Please discuss with your doctor the options that are available. If you experience further weakness or falls, or hit your head, please come back to the ED.      SECONDARY DISCHARGE DIAGNOSES  Diagnosis: Depression  Assessment and Plan of Treatment:      PRINCIPAL DISCHARGE DIAGNOSIS  Diagnosis: Alcohol abuse  Assessment and Plan of Treatment: You came to the hospital under the recommendation of your primary care doctor who was concerned about your alcohol use and falls at home. Your falls at home may be related to your alcohol use. You were admitted to the hospital due to concern for possible alcohol withdrawal symptoms. While you were in the hospital, you were on medications to help prevent any withdrawal symptoms but you never developed any of those symptoms and therefore did not require any further doses of ativan. You were also feeling very weak initially, but gradually improved in strength. The physical therapist evaluated you and determined that you may be discharged home with home physical therapy. It is important that you avoid alcohol so that these issues do not happen again. Please follow up with your primary care doctor, Dr. Steven, within 2 weeks upon discharge. Your doctor would be able to provide you with additional resource you need to help with alcohol cessation. Please discuss with your doctor the options that are available. If you experience further weakness or falls, or hit your head, please come back to the ED.

## 2021-03-05 NOTE — DISCHARGE NOTE PROVIDER - HOSPITAL COURSE
82 y/o F pmhx htn, hld, hypothyroidism, anxiety, NAFLD, EtOH abuse, depression, presenting BIBEMS from her PMD office Dr. Steven after presenting there for concern for 'wanting to stop drinking.' Patient was admitted to the hospital due to falls in the setting of heavy alcohol use at home. In the ED, patient received 1mg of ativan but blood alcohol level was negative. CT head negative for any mass or hemorrhage. Pelvis xray negative for any fractures. On the floors, patient was kept on symptom triggered CIWA protocol but did not require any pushes of ativan. CIWA scores remained 0-1 throughout hospital stay. Patient was evaluated by physical therapy who recommended discharge home with home physical therapy. On 3/6/21, patient was medically optimized for discharge home. 82 y/o F pmhx htn, hld, hypothyroidism, anxiety, NAFLD, EtOH abuse, depression, presenting BIBEMS from her PMD office Dr. Steven after presenting there for concern for 'wanting to stop drinking.' Patient was admitted to the hospital due to falls in the setting of heavy alcohol use at home. In the ED, patient received 1mg of ativan but blood alcohol level was negative. CT head negative for any mass or hemorrhage. Pelvis xray negative for any fractures. On the floors, patient was kept on symptom triggered CIWA protocol but did not require any pushes of ativan. CIWA scores remained 0-1 throughout hospital stay. Patient was evaluated by physical therapy who recommended discharge home with home physical therapy. On 3/7/21, patient was medically optimized for discharge home. Patient discharged home with rolling walker.

## 2021-03-05 NOTE — PROGRESS NOTE ADULT - PROBLEM SELECTOR PLAN 1
[facility] protocol, symptom triggered   - monitor for symptoms of withdrawal  - MV, folate, thiamine, B12

## 2021-03-05 NOTE — PROGRESS NOTE ADULT - SUBJECTIVE AND OBJECTIVE BOX
PROGRESS NOTE:   Authored by Rl Suazo MD  PGY-1, Internal Medicine  Pager [facility] [phone], [id number]     Patient is a 81y old  Female who presents with a chief complaint of frequent falls and alcohol ism (04 Mar 2021 14:25)      SUBJECTIVE / OVERNIGHT EVENTS: No events overnight.    ADDITIONAL REVIEW OF SYSTEMS: Denies fevers, chills, n/v.    MEDICATIONS  (STANDING):  artificial tears (preservative free) Ophthalmic Solution 1 Drop(s) Both EYES four times a day  atorvastatin 40 milliGRAM(s) Oral at bedtime  carvedilol 25 milliGRAM(s) Oral every 12 hours  dorzolamide 2%/timolol 0.5% Ophthalmic Solution 1 Drop(s) Both EYES two times a day  enoxaparin Injectable 40 milliGRAM(s) SubCutaneous daily  folic acid 1 milliGRAM(s) Oral daily  latanoprost 0.005% Ophthalmic Solution 1 Drop(s) Both EYES at bedtime  levothyroxine 50 MICROGram(s) Oral daily  melatonin 5 milliGRAM(s) Oral at bedtime  multivitamin 1 Tablet(s) Oral daily  pantoprazole    Tablet 40 milliGRAM(s) Oral before breakfast  senna 2 Tablet(s) Oral at bedtime  thiamine 100 milliGRAM(s) Oral daily  timolol 0.25% Solution 1 Drop(s) Both EYES two times a day    MEDICATIONS  (PRN):  LORazepam   Injectable 2 milliGRAM(s) IV Push every 2 hours PRN Symptom-triggered: 2 point increase in CIWA -Ar score and a total score of 7 or LESS      CAPILLARY BLOOD GLUCOSE        I&O's Summary    04 Mar 2021 07:01  -  05 Mar 2021 07:00  --------------------------------------------------------  IN: 1080 mL / OUT: 1000 mL / NET: 80 mL        PHYSICAL EXAM:  Vital Signs Last 24 Hrs  T(C): 36.5 (05 Mar 2021 06:32), Max: 36.8 (05 Mar 2021 03:26)  T(F): 97.7 (05 Mar 2021 06:32), Max: 98.3 (05 Mar 2021 03:26)  HR: 77 (05 Mar 2021 06:32) (76 - 93)  BP: 113/73 (05 Mar 2021 06:32) (102/69 - 132/78)  BP(mean): --  RR: 18 (05 Mar 2021 06:32) (16 - 18)  SpO2: 93% (05 Mar 2021 06:32) (92% - 96%)    CONSTITUTIONAL: NAD, lying in bed comfortably  RESPIRATORY: Normal respiratory effort; CTABL  CARDIOVASCULAR: Regular rate and rhythm, normal S1 and S2, no murmur/rub/gallop  ABDOMEN: Soft, nondistended, nontender to palpation, normoactive bowel sounds, no rebound/guarding  MUSCLOSKELETAL: no joint swelling or tenderness to palpation, FROM all extremities  NEURO: AAOx3 to person, place, and time, full and equal strength all extremities   EXTREMITIES: no pedal edema    LABS:                        11.1   3.39  )-----------( 56       ( 05 Mar 2021 06:48 )             33.3     03-04    138  |  98  |  15  ----------------------------<  76  2.8<LL>   |  21<L>  |  0.95    Ca    9.0      04 Mar 2021 07:03  Phos  1.8     03-04  Mg     1.9     03-04    TPro  5.4<L>  /  Alb  3.2<L>  /  TBili  0.7  /  DBili  x   /  AST  87<H>  /  ALT  34  /  AlkPhos  94  03-04    PT/INR - ( 03 Mar 2021 13:18 )   PT: 11.2 sec;   INR: 0.93 ratio         PTT - ( 03 Mar 2021 13:18 )  PTT:21.2 sec            RADIOLOGY & ADDITIONAL TESTS:

## 2021-03-05 NOTE — PROGRESS NOTE ADULT - ASSESSMENT
Patient with hx of HTN, HLD, hypothyroidism, anxiety, NAFLD, EtOH abuse, presenting with frequent falls and current alcohol abuse. Patient on symptom- triggered CIWA.

## 2021-03-05 NOTE — DISCHARGE NOTE NURSING/CASE MANAGEMENT/SOCIAL WORK - PATIENT PORTAL LINK FT
You can access the FollowMyHealth Patient Portal offered by [facility] by registering at the following website: [email or web address]. By joining [employer]’s FollowMyHealth portal, you will also be able to view your health information using other applications (apps) compatible with our system.

## 2021-03-05 NOTE — DISCHARGE NOTE PROVIDER - NSDCMRMEDTOKEN_GEN_ALL_CORE_FT
Betimol 0.5% ophthalmic solution: 1 drop(s) to each affected eye 2 times a day  Centrum Silver oral tablet: 1 tab(s) orally once a day  Coreg 25 mg oral tablet: 1 tab(s) orally 2 times a day  dorzolamide-timolol 2%-0.5% preservative-free ophthalmic solution: 1 drop(s) to each affected eye 2 times a day  folic acid 1 mg oral tablet: 1 tab(s) orally once a day  latanoprost 0.005% ophthalmic solution: 1 drop(s) to each affected eye once a day (in the evening)  levothyroxine 50 mcg (0.05 mg) oral tablet: 1 tab(s) orally once a day  Lipitor 40 mg oral tablet: 1 tab(s) orally once a day  melatonin 5 mg oral tablet: 1 tab(s) orally once a day (at bedtime)  Multiple Vitamins oral tablet: 1 tab(s) orally once a day  Physical Therapy evaluation: Physical therapy evaluation   Rolling Walker : 1 rolling walker with 5 inch wheels   Saline Nasal Mist: 1 spray(s) nasal once a day (at bedtime)  thiamine 100 mg oral tablet: 1 tab(s) orally once a day  timolol hemihydrate 0.5% ophthalmic solution: 1 drop(s) to each affected eye 2 times a day  Travatan Z 0.004% ophthalmic solution: 1 drop(s) to each affected eye once a day (in the evening)  Vitamin C 1000 mg oral tablet: 1 tab(s) orally once a day  Vitamin D3 1000 intl units (25 mcg) oral tablet: 1 cap(s) orally once a day  vitamin E 400 intl units oral capsule: 1 cap(s) orally once a day

## 2021-03-05 NOTE — DISCHARGE NOTE PROVIDER - CARE PROVIDER_API CALL
Augustus Steven  GASTROENTEROLOGY  [address] [place] [address], [address]  [place] [address]  Phone: [phone]  Fax: [phone]  Established Patient  Follow Up Time: 2 weeks

## 2021-03-05 NOTE — PROGRESS NOTE ADULT - SUBJECTIVE AND OBJECTIVE BOX
82 y/o F pmhx htn, hld, hypothyroidism, anxiety, NAFLD, EtOH abuse, depression, presenting BIBEMS from her PMD office Dr. Steven today after presenting there for concern for 'wanting to stop drinking.' She reports that she has been drinking approximately 12-14oz of vodka per day since the beginning of the pandemic. Has suffered from alcoholism for many years prior to that, though stopped for along time. She reports that she has been having some falls while intoxicated but unclear if she ever hit her head or lost consciousness. Today went to Dr. Steven's office to discuss with him that she feels that she really wants to stop drinking; however, she informed him of the fact that she has had some falls, and he found her BP to be low in the office so sent her in. She reports that she is still eating and drinking but not as much as previously and not as much as she knows she should. She denies any pain anywhere. Denies infectious symptoms, cough, chest pain, shortness of breath, weakness. States her last drink was 3/2. Patient seen resting comfortably. Patient has been seen by physical therapy       MEDICATIONS  (STANDING):  artificial tears (preservative free) Ophthalmic Solution 1 Drop(s) Both EYES four times a day  atorvastatin 40 milliGRAM(s) Oral at bedtime  carvedilol 25 milliGRAM(s) Oral every 12 hours  dorzolamide 2%/timolol 0.5% Ophthalmic Solution 1 Drop(s) Both EYES two times a day  folic acid 1 milliGRAM(s) Oral daily  latanoprost 0.005% Ophthalmic Solution 1 Drop(s) Both EYES at bedtime  levothyroxine 50 MICROGram(s) Oral daily  melatonin 5 milliGRAM(s) Oral at bedtime  multivitamin 1 Tablet(s) Oral daily  pantoprazole    Tablet 40 milliGRAM(s) Oral before breakfast  senna 2 Tablet(s) Oral at bedtime  thiamine 100 milliGRAM(s) Oral daily  timolol 0.25% Solution 1 Drop(s) Both EYES two times a day    MEDICATIONS  (PRN):  LORazepam   Injectable 2 milliGRAM(s) IV Push every 2 hours PRN Symptom-triggered: 2 point increase in CIWA -Ar score and a total score of 7 or LESS          VITALS:   T(C): 36.4 (03-05-21 @ 11:16), Max: 36.8 (03-05-21 @ 03:26)  HR: 77 (03-05-21 @ 15:29) (76 - 93)  BP: 142/85 (03-05-21 @ 15:29) (113/73 - 142/85)  RR: 18 (03-05-21 @ 06:32) (16 - 18)  SpO2: 94% (03-05-21 @ 15:29) (92% - 96%)  Wt(kg): --      PHYSICAL EXAM:  GENERAL: NAD, well nourished and conversant  HEAD:  Atraumatic  EYES: EOM, PERRLA, conjunctiva pink and sclera white  ENT: No tonsillar erythema, exudates, or enlargement, moist mucous membranes, good dentition, no lesions  NECK: Supple, No JVD, normal thyroid, carotids with normal upstrokes and no bruits  CHEST/LUNG: Clear to auscultation bilaterally, No rales, rhonchi, wheezing, or rubs  HEART: Regular rate and rhythm, No murmurs, rubs, or gallops  ABDOMEN: Soft, nondistended, no masses, guarding, tenderness or rebound, bowel sounds present  EXTREMITIES:  2+ Peripheral Pulses, No clubbing, cyanosis, or edema.   LYMPH: No lymphadenopathy noted  SKIN: No rashes or lesions  NERVOUS SYSTEM:  Alert & Oriented X3, normal cognitive function. Motor Strength 5/5 right upper and right lower.  5/5 left upper and left lower extremities, DTRs 2+ intact and symmetric    LABS:        CBC Full  -  ( 05 Mar 2021 06:48 )  WBC Count : 3.39 K/uL  RBC Count : 3.20 M/uL  Hemoglobin : 11.1 g/dL  Hematocrit : 33.3 %  Platelet Count - Automated : 56 K/uL  Mean Cell Volume : 104.1 fl  Mean Cell Hemoglobin : 34.7 pg  Mean Cell Hemoglobin Concentration : 33.3 gm/dL  Auto Neutrophil # : x  Auto Lymphocyte # : x  Auto Monocyte # : x  Auto Eosinophil # : x  Auto Basophil # : x  Auto Neutrophil % : x  Auto Lymphocyte % : x  Auto Monocyte % : x  Auto Eosinophil % : x  Auto Basophil % : x    03-05    140  |  104  |  13  ----------------------------<  112<H>  3.0<L>   |  23  |  0.81    Ca    8.7      05 Mar 2021 06:48  Phos  3.0     03-05  Mg     1.8     03-05    TPro  5.1<L>  /  Alb  3.0<L>  /  TBili  0.5  /  DBili  x   /  AST  79<H>  /  ALT  30  /  AlkPhos  97  03-05    LIVER FUNCTIONS - ( 05 Mar 2021 06:48 )  Alb: 3.0 g/dL / Pro: 5.1 g/dL / ALK PHOS: 97 U/L / ALT: 30 U/L / AST: 79 U/L / GGT: x               CAPILLARY BLOOD GLUCOSE          RADIOLOGY & ADDITIONAL TESTS:

## 2021-03-06 RX ADMIN — Medication 1 DROP(S): at 17:27

## 2021-03-06 RX ADMIN — Medication 1 DROP(S): at 05:20

## 2021-03-06 RX ADMIN — SENNA PLUS 2 TABLET(S): 8.6 TABLET ORAL at 21:20

## 2021-03-06 RX ADMIN — Medication 5 MILLIGRAM(S): at 21:20

## 2021-03-06 RX ADMIN — Medication 1 TABLET(S): at 11:03

## 2021-03-06 RX ADMIN — CARVEDILOL PHOSPHATE 25 MILLIGRAM(S): 80 CAPSULE, EXTENDED RELEASE ORAL at 05:20

## 2021-03-06 RX ADMIN — Medication 1 DROP(S): at 23:53

## 2021-03-06 RX ADMIN — Medication 100 MILLIGRAM(S): at 11:03

## 2021-03-06 RX ADMIN — Medication 1 DROP(S): at 11:04

## 2021-03-06 RX ADMIN — Medication 1 DROP(S): at 00:02

## 2021-03-06 RX ADMIN — CARVEDILOL PHOSPHATE 25 MILLIGRAM(S): 80 CAPSULE, EXTENDED RELEASE ORAL at 17:27

## 2021-03-06 RX ADMIN — DORZOLAMIDE HYDROCHLORIDE TIMOLOL MALEATE 1 DROP(S): 20; 5 SOLUTION/ DROPS OPHTHALMIC at 05:21

## 2021-03-06 RX ADMIN — ATORVASTATIN CALCIUM 40 MILLIGRAM(S): 80 TABLET, FILM COATED ORAL at 21:20

## 2021-03-06 RX ADMIN — Medication 50 MICROGRAM(S): at 05:21

## 2021-03-06 RX ADMIN — DORZOLAMIDE HYDROCHLORIDE TIMOLOL MALEATE 1 DROP(S): 20; 5 SOLUTION/ DROPS OPHTHALMIC at 17:27

## 2021-03-06 RX ADMIN — Medication 1 MILLIGRAM(S): at 11:04

## 2021-03-06 RX ADMIN — LATANOPROST 1 DROP(S): 0.05 SOLUTION/ DROPS OPHTHALMIC; TOPICAL at 21:20

## 2021-03-06 RX ADMIN — PANTOPRAZOLE SODIUM 40 MILLIGRAM(S): 20 TABLET, DELAYED RELEASE ORAL at 05:23

## 2021-03-06 NOTE — PROGRESS NOTE ADULT - SUBJECTIVE AND OBJECTIVE BOX
PROGRESS NOTE:   Authored by Martin White MD  PGY-2, Internal Medicine  Pager [facility] [phone]    Patient is a 81y old  Female who presents with a chief complaint of frequent falls and alcohol ism (04 Mar 2021 14:25)      SUBJECTIVE / OVERNIGHT EVENTS: No events overnight. CIWA stable overnight. 1-2    ADDITIONAL REVIEW OF SYSTEMS: Denies fevers, chills, n/v.    MEDICATIONS  (STANDING):  artificial tears (preservative free) Ophthalmic Solution 1 Drop(s) Both EYES four times a day  atorvastatin 40 milliGRAM(s) Oral at bedtime  carvedilol 25 milliGRAM(s) Oral every 12 hours  dorzolamide 2%/timolol 0.5% Ophthalmic Solution 1 Drop(s) Both EYES two times a day  enoxaparin Injectable 40 milliGRAM(s) SubCutaneous daily  folic acid 1 milliGRAM(s) Oral daily  latanoprost 0.005% Ophthalmic Solution 1 Drop(s) Both EYES at bedtime  levothyroxine 50 MICROGram(s) Oral daily  melatonin 5 milliGRAM(s) Oral at bedtime  multivitamin 1 Tablet(s) Oral daily  pantoprazole    Tablet 40 milliGRAM(s) Oral before breakfast  senna 2 Tablet(s) Oral at bedtime  thiamine 100 milliGRAM(s) Oral daily  timolol 0.25% Solution 1 Drop(s) Both EYES two times a day    MEDICATIONS  (PRN):  LORazepam   Injectable 2 milliGRAM(s) IV Push every 2 hours PRN Symptom-triggered: 2 point increase in CIWA -Ar score and a total score of 7 or LESS      CAPILLARY BLOOD GLUCOSE        I&O's Summary    04 Mar 2021 07:01  -  05 Mar 2021 07:00  --------------------------------------------------------  IN: 1080 mL / OUT: 1000 mL / NET: 80 mL        PHYSICAL EXAM:  Vital Signs Last 24 Hrs  T(C): 36.5 (05 Mar 2021 06:32), Max: 36.8 (05 Mar 2021 03:26)  T(F): 97.7 (05 Mar 2021 06:32), Max: 98.3 (05 Mar 2021 03:26)  HR: 77 (05 Mar 2021 06:32) (76 - 93)  BP: 113/73 (05 Mar 2021 06:32) (102/69 - 132/78)  BP(mean): --  RR: 18 (05 Mar 2021 06:32) (16 - 18)  SpO2: 93% (05 Mar 2021 06:32) (92% - 96%)    CONSTITUTIONAL: NAD, lying in bed comfortably  RESPIRATORY: Normal respiratory effort; CTABL  CARDIOVASCULAR: Regular rate and rhythm, normal S1 and S2, no murmur/rub/gallop  ABDOMEN: Soft, nondistended, nontender to palpation, normoactive bowel sounds, no rebound/guarding  MUSCLOSKELETAL: no joint swelling or tenderness to palpation, FROM all extremities  NEURO: AAOx3 to person, place, and time, full and equal strength all extremities   EXTREMITIES: no pedal edema    LABS:                        11.1   3.39  )-----------( 56       ( 05 Mar 2021 06:48 )             33.3     03-04    138  |  98  |  15  ----------------------------<  76  2.8<LL>   |  21<L>  |  0.95    Ca    9.0      04 Mar 2021 07:03  Phos  1.8     03-04  Mg     1.9     03-04    TPro  5.4<L>  /  Alb  3.2<L>  /  TBili  0.7  /  DBili  x   /  AST  87<H>  /  ALT  34  /  AlkPhos  94  03-04    PT/INR - ( 03 Mar 2021 13:18 )   PT: 11.2 sec;   INR: 0.93 ratio         PTT - ( 03 Mar 2021 13:18 )  PTT:21.2 sec            RADIOLOGY & ADDITIONAL TESTS:

## 2021-03-06 NOTE — PROGRESS NOTE ADULT - PROBLEM SELECTOR PLAN 6
continue physical therapy   would start celebrex for pain  discussed increasing physical therapy with physical therapist   Patient need to be able to negotiate stairs

## 2021-03-06 NOTE — PROGRESS NOTE ADULT - SUBJECTIVE AND OBJECTIVE BOX
82 y/o F pmhx htn, hld, hypothyroidism, anxiety, NAFLD, EtOH abuse, depression, presenting BIBEMS from her PMD office Dr. Steven today after presenting there for concern for 'wanting to stop drinking.' She reports that she has been drinking approximately 12-14oz of vodka per day since the beginning of the pandemic. Has suffered from alcoholism for many years prior to that, though stopped for along time. She reports that she has been having some falls while intoxicated but unclear if she ever hit her head or lost consciousness. Today went to Dr. Steven's office to discuss with him that she feels that she really wants to stop drinking; however, she informed him of the fact that she has had some falls, and he found her BP to be low in the office so sent her in. She reports that she is still eating and drinking but not as much as previously and not as much as she knows she should. She denies any pain anywhere. Denies infectious symptoms, cough, chest pain, shortness of breath, weakness. States her last drink was 3/2. Patient seen resting comfortably. Patient has been seen by physical therapy. Patient has 14 stairs to negotiate at home. discussed increased physical therapy with physical therapist this am     MEDICATIONS  (STANDING):  artificial tears (preservative free) Ophthalmic Solution 1 Drop(s) Both EYES four times a day  atorvastatin 40 milliGRAM(s) Oral at bedtime  carvedilol 25 milliGRAM(s) Oral every 12 hours  dorzolamide 2%/timolol 0.5% Ophthalmic Solution 1 Drop(s) Both EYES two times a day  folic acid 1 milliGRAM(s) Oral daily  latanoprost 0.005% Ophthalmic Solution 1 Drop(s) Both EYES at bedtime  levothyroxine 50 MICROGram(s) Oral daily  melatonin 5 milliGRAM(s) Oral at bedtime  multivitamin 1 Tablet(s) Oral daily  pantoprazole    Tablet 40 milliGRAM(s) Oral before breakfast  senna 2 Tablet(s) Oral at bedtime  thiamine 100 milliGRAM(s) Oral daily  timolol 0.25% Solution 1 Drop(s) Both EYES two times a day    MEDICATIONS  (PRN):  LORazepam   Injectable 2 milliGRAM(s) IV Push every 2 hours PRN Symptom-triggered: 2 point increase in CIWA -Ar score and a total score of 7 or LESS          VITALS:   T(C): 36.7 (03-06-21 @ 10:47), Max: 37 (03-06-21 @ 03:12)  HR: 81 (03-06-21 @ 10:47) (77 - 87)  BP: 107/69 (03-06-21 @ 10:47) (107/69 - 152/90)  RR: 18 (03-06-21 @ 10:47) (18 - 18)  SpO2: 94% (03-06-21 @ 10:47) (93% - 95%)  Wt(kg): --      PHYSICAL EXAM:  GENERAL: NAD, well nourished and conversant  HEAD:  Atraumatic  EYES: EOM, PERRLA, conjunctiva pink and sclera white  ENT: No tonsillar erythema, exudates, or enlargement, moist mucous membranes, good dentition, no lesions  NECK: Supple, No JVD, normal thyroid, carotids with normal upstrokes and no bruits  CHEST/LUNG: Clear to auscultation bilaterally, No rales, rhonchi, wheezing, or rubs  HEART: Regular rate and rhythm, No murmurs, rubs, or gallops  ABDOMEN: Soft, nondistended, no masses, guarding, tenderness or rebound, bowel sounds present  EXTREMITIES:  2+ Peripheral Pulses, No clubbing, cyanosis, or edema.   LYMPH: No lymphadenopathy noted  SKIN: No rashes or lesions  NERVOUS SYSTEM:  Alert & Oriented X3, normal cognitive function. Motor Strength 5/5 right upper and right lower.  5/5 left upper and left lower extremities, DTRs 2+ intact and symmetric  LABS:        CBC Full  -  ( 05 Mar 2021 06:48 )  WBC Count : 3.39 K/uL  RBC Count : 3.20 M/uL  Hemoglobin : 11.1 g/dL  Hematocrit : 33.3 %  Platelet Count - Automated : 56 K/uL  Mean Cell Volume : 104.1 fl  Mean Cell Hemoglobin : 34.7 pg  Mean Cell Hemoglobin Concentration : 33.3 gm/dL  Auto Neutrophil # : x  Auto Lymphocyte # : x  Auto Monocyte # : x  Auto Eosinophil # : x  Auto Basophil # : x  Auto Neutrophil % : x  Auto Lymphocyte % : x  Auto Monocyte % : x  Auto Eosinophil % : x  Auto Basophil % : x    03-05    140  |  104  |  13  ----------------------------<  112<H>  3.0<L>   |  23  |  0.81    Ca    8.7      05 Mar 2021 06:48  Phos  3.0     03-05  Mg     1.8     03-05    TPro  5.1<L>  /  Alb  3.0<L>  /  TBili  0.5  /  DBili  x   /  AST  79<H>  /  ALT  30  /  AlkPhos  97  03-05    LIVER FUNCTIONS - ( 05 Mar 2021 06:48 )  Alb: 3.0 g/dL / Pro: 5.1 g/dL / ALK PHOS: 97 U/L / ALT: 30 U/L / AST: 79 U/L / GGT: x               CAPILLARY BLOOD GLUCOSE          RADIOLOGY & ADDITIONAL TESTS:

## 2021-03-06 NOTE — PROGRESS NOTE ADULT - PROBLEM SELECTOR PLAN 1
Ciwa protocol, symptom triggered   - monitor for symptoms of withdrawal, likely DC today with home PT. Rolling walker and home PT set.   - MV, folate, thiamine, B12 Ciwa protocol, symptom triggered   - monitor for symptoms of withdrawal, likely DC tmmrw/monday with home PT. Rolling walker and home PT set.   - MV, folate, thiamine, B12

## 2021-03-07 VITALS
HEART RATE: 76 BPM | OXYGEN SATURATION: 94 % | DIASTOLIC BLOOD PRESSURE: 75 MMHG | SYSTOLIC BLOOD PRESSURE: 110 MMHG | TEMPERATURE: 98 F | RESPIRATION RATE: 16 BRPM

## 2021-03-07 RX ADMIN — Medication 50 MICROGRAM(S): at 06:54

## 2021-03-07 RX ADMIN — DORZOLAMIDE HYDROCHLORIDE TIMOLOL MALEATE 1 DROP(S): 20; 5 SOLUTION/ DROPS OPHTHALMIC at 06:55

## 2021-03-07 RX ADMIN — Medication 100 MILLIGRAM(S): at 11:39

## 2021-03-07 RX ADMIN — Medication 1 MILLIGRAM(S): at 11:39

## 2021-03-07 RX ADMIN — Medication 1 DROP(S): at 06:54

## 2021-03-07 RX ADMIN — Medication 1 DROP(S): at 06:55

## 2021-03-07 RX ADMIN — DORZOLAMIDE HYDROCHLORIDE TIMOLOL MALEATE 1 DROP(S): 20; 5 SOLUTION/ DROPS OPHTHALMIC at 17:17

## 2021-03-07 RX ADMIN — PANTOPRAZOLE SODIUM 40 MILLIGRAM(S): 20 TABLET, DELAYED RELEASE ORAL at 06:54

## 2021-03-07 RX ADMIN — Medication 1 DROP(S): at 17:16

## 2021-03-07 RX ADMIN — Medication 1 DROP(S): at 11:38

## 2021-03-07 RX ADMIN — CARVEDILOL PHOSPHATE 25 MILLIGRAM(S): 80 CAPSULE, EXTENDED RELEASE ORAL at 17:17

## 2021-03-07 RX ADMIN — CARVEDILOL PHOSPHATE 25 MILLIGRAM(S): 80 CAPSULE, EXTENDED RELEASE ORAL at 06:54

## 2021-03-07 RX ADMIN — Medication 1 TABLET(S): at 11:39

## 2021-03-07 NOTE — PROGRESS NOTE ADULT - SUBJECTIVE AND OBJECTIVE BOX
82 y/o F pmhx htn, hld, hypothyroidism, anxiety, NAFLD, EtOH abuse, depression, presenting BIBEMS from her PMD office Dr. Steven today after presenting there for concern for 'wanting to stop drinking.' She reports that she has been drinking approximately 12-14oz of vodka per day since the beginning of the pandemic. Has suffered from alcoholism for many years prior to that, though stopped for along time. She reports that she has been having some falls while intoxicated but unclear if she ever hit her head or lost consciousness. Today went to Dr. Steven's office to discuss with him that she feels that she really wants to stop drinking; however, she informed him of the fact that she has had some falls, and he found her BP to be low in the office so sent her in. She reports that she is still eating and drinking but not as much as previously and not as much as she knows she should. She denies any pain anywhere. Denies infectious symptoms, cough, chest pain, shortness of breath, weakness. States her last drink was 3/2. Patient seen resting comfortably. Patient has been seen by physical therapy. Patient participated with physical therapy yesterday.     MEDICATIONS  (STANDING):  artificial tears (preservative free) Ophthalmic Solution 1 Drop(s) Both EYES four times a day  atorvastatin 40 milliGRAM(s) Oral at bedtime  carvedilol 25 milliGRAM(s) Oral every 12 hours  dorzolamide 2%/timolol 0.5% Ophthalmic Solution 1 Drop(s) Both EYES two times a day  folic acid 1 milliGRAM(s) Oral daily  latanoprost 0.005% Ophthalmic Solution 1 Drop(s) Both EYES at bedtime  levothyroxine 50 MICROGram(s) Oral daily  melatonin 5 milliGRAM(s) Oral at bedtime  multivitamin 1 Tablet(s) Oral daily  pantoprazole    Tablet 40 milliGRAM(s) Oral before breakfast  senna 2 Tablet(s) Oral at bedtime  thiamine 100 milliGRAM(s) Oral daily  timolol 0.25% Solution 1 Drop(s) Both EYES two times a day    MEDICATIONS  (PRN):  LORazepam   Injectable 2 milliGRAM(s) IV Push every 2 hours PRN Symptom-triggered: 2 point increase in CIWA -Ar score and a total score of 7 or LESS          VITALS:   T(C): 36.8 (03-07-21 @ 06:51), Max: 36.8 (03-07-21 @ 02:50)  HR: 81 (03-07-21 @ 06:51) (76 - 82)  BP: 127/79 (03-07-21 @ 06:51) (107/69 - 144/82)  RR: 16 (03-07-21 @ 06:51) (16 - 18)  SpO2: 93% (03-07-21 @ 06:51) (93% - 95%)  Wt(kg): --      PHYSICAL EXAM:  GENERAL: NAD, well nourished and conversant  HEAD:  Atraumatic  EYES: EOM, PERRLA, conjunctiva pink and sclera white  ENT: No tonsillar erythema, exudates, or enlargement, moist mucous membranes, good dentition, no lesions  NECK: Supple, No JVD, normal thyroid, carotids with normal upstrokes and no bruits  CHEST/LUNG: Clear to auscultation bilaterally, No rales, rhonchi, wheezing, or rubs  HEART: Regular rate and rhythm, No murmurs, rubs, or gallops  ABDOMEN: Soft, nondistended, no masses, guarding, tenderness or rebound, bowel sounds present  EXTREMITIES:  2+ Peripheral Pulses, No clubbing, cyanosis, or edema.   LYMPH: No lymphadenopathy noted  SKIN: No rashes or lesions  NERVOUS SYSTEM:  Alert & Oriented X3, normal cognitive function. Motor Strength 5/5 right upper and right lower.  5/5 left upper and left lower extremities, DTRs 2+ intact and symmetric  LABS:

## 2021-03-07 NOTE — PROGRESS NOTE ADULT - PROBLEM SELECTOR PLAN 8
Transitions of Care Status:  Name of PCP: Dr. Augustus Steven    Will reach out to  Maryann for help with services such as AA or inpatient detox programs.

## 2021-03-07 NOTE — PROGRESS NOTE ADULT - REASON FOR ADMISSION
frequent falls and alcohol ism

## 2021-03-07 NOTE — PROGRESS NOTE ADULT - SUBJECTIVE AND OBJECTIVE BOX
PROGRESS NOTE:   Authored by Rl Suazo MD  PGY-1, Internal Medicine  Pager [facility] [phone], [id number]     Patient is a 81y old  Female who presents with a chief complaint of frequent falls and alcohol ism (06 Mar 2021 13:40)      SUBJECTIVE / OVERNIGHT EVENTS: No events overnight.    ADDITIONAL REVIEW OF SYSTEMS: Denies fevers, chills, n/v.    MEDICATIONS  (STANDING):  artificial tears (preservative free) Ophthalmic Solution 1 Drop(s) Both EYES four times a day  atorvastatin 40 milliGRAM(s) Oral at bedtime  carvedilol 25 milliGRAM(s) Oral every 12 hours  dorzolamide 2%/timolol 0.5% Ophthalmic Solution 1 Drop(s) Both EYES two times a day  folic acid 1 milliGRAM(s) Oral daily  latanoprost 0.005% Ophthalmic Solution 1 Drop(s) Both EYES at bedtime  levothyroxine 50 MICROGram(s) Oral daily  melatonin 5 milliGRAM(s) Oral at bedtime  multivitamin 1 Tablet(s) Oral daily  pantoprazole    Tablet 40 milliGRAM(s) Oral before breakfast  senna 2 Tablet(s) Oral at bedtime  thiamine 100 milliGRAM(s) Oral daily  timolol 0.25% Solution 1 Drop(s) Both EYES two times a day    MEDICATIONS  (PRN):  LORazepam   Injectable 2 milliGRAM(s) IV Push every 2 hours PRN Symptom-triggered: 2 point increase in CIWA -Ar score and a total score of 7 or LESS      CAPILLARY BLOOD GLUCOSE        I&O's Summary    06 Mar 2021 07:01  -  07 Mar 2021 07:00  --------------------------------------------------------  IN: 840 mL / OUT: 500 mL / NET: 340 mL        PHYSICAL EXAM:  Vital Signs Last 24 Hrs  T(C): 36.8 (07 Mar 2021 06:51), Max: 36.8 (07 Mar 2021 02:50)  T(F): 98.3 (07 Mar 2021 06:51), Max: 98.3 (07 Mar 2021 06:51)  HR: 81 (07 Mar 2021 06:51) (76 - 82)  BP: 127/79 (07 Mar 2021 06:51) (107/69 - 144/82)  BP(mean): --  RR: 16 (07 Mar 2021 06:51) (16 - 18)  SpO2: 93% (07 Mar 2021 06:51) (93% - 95%)    CONSTITUTIONAL: NAD, lying in bed comfortably  RESPIRATORY: Normal respiratory effort; CTABL  CARDIOVASCULAR: Regular rate and rhythm, normal S1 and S2, no murmur/rub/gallop  ABDOMEN: Soft, nondistended, nontender to palpation, normoactive bowel sounds, no rebound/guarding  MUSCLOSKELETAL: no joint swelling or tenderness to palpation, FROM all extremities  NEURO: AAOx3 to person, place, and time, full and equal strength all extremities   EXTREMITIES: no pedal edema    LABS:                      RADIOLOGY & ADDITIONAL TESTS:

## 2021-03-07 NOTE — PROGRESS NOTE ADULT - PROBLEM SELECTOR PLAN 1
Ciwa protocol, symptom triggered   - monitor for symptoms of withdrawal, likely DC tmmrw/monday with home PT. Rolling walker and home PT set.   - MV, folate, thiamine, B12

## 2021-03-16 PROCEDURE — 71045 X-RAY EXAM CHEST 1 VIEW: CPT

## 2021-03-16 PROCEDURE — 82330 ASSAY OF CALCIUM: CPT

## 2021-03-16 PROCEDURE — 85014 HEMATOCRIT: CPT

## 2021-03-16 PROCEDURE — 85018 HEMOGLOBIN: CPT

## 2021-03-16 PROCEDURE — 82803 BLOOD GASES ANY COMBINATION: CPT

## 2021-03-16 PROCEDURE — U0003: CPT

## 2021-03-16 PROCEDURE — 80307 DRUG TEST PRSMV CHEM ANLYZR: CPT

## 2021-03-16 PROCEDURE — 85027 COMPLETE CBC AUTOMATED: CPT

## 2021-03-16 PROCEDURE — 84100 ASSAY OF PHOSPHORUS: CPT

## 2021-03-16 PROCEDURE — 96375 TX/PRO/DX INJ NEW DRUG ADDON: CPT

## 2021-03-16 PROCEDURE — 97161 PT EVAL LOW COMPLEX 20 MIN: CPT

## 2021-03-16 PROCEDURE — 85610 PROTHROMBIN TIME: CPT

## 2021-03-16 PROCEDURE — 82947 ASSAY GLUCOSE BLOOD QUANT: CPT

## 2021-03-16 PROCEDURE — 72170 X-RAY EXAM OF PELVIS: CPT

## 2021-03-16 PROCEDURE — 83735 ASSAY OF MAGNESIUM: CPT

## 2021-03-16 PROCEDURE — 82607 VITAMIN B-12: CPT

## 2021-03-16 PROCEDURE — 96374 THER/PROPH/DIAG INJ IV PUSH: CPT

## 2021-03-16 PROCEDURE — 85730 THROMBOPLASTIN TIME PARTIAL: CPT

## 2021-03-16 PROCEDURE — 84295 ASSAY OF SERUM SODIUM: CPT

## 2021-03-16 PROCEDURE — 82248 BILIRUBIN DIRECT: CPT

## 2021-03-16 PROCEDURE — 82435 ASSAY OF BLOOD CHLORIDE: CPT

## 2021-03-16 PROCEDURE — 97112 NEUROMUSCULAR REEDUCATION: CPT

## 2021-03-16 PROCEDURE — 97116 GAIT TRAINING THERAPY: CPT

## 2021-03-16 PROCEDURE — 82962 GLUCOSE BLOOD TEST: CPT

## 2021-03-16 PROCEDURE — 86769 SARS-COV-2 COVID-19 ANTIBODY: CPT

## 2021-03-16 PROCEDURE — U0005: CPT

## 2021-03-16 PROCEDURE — 70450 CT HEAD/BRAIN W/O DYE: CPT

## 2021-03-16 PROCEDURE — 83605 ASSAY OF LACTIC ACID: CPT

## 2021-03-16 PROCEDURE — 80053 COMPREHEN METABOLIC PANEL: CPT

## 2021-03-16 PROCEDURE — 99285 EMERGENCY DEPT VISIT HI MDM: CPT | Mod: 25

## 2021-03-16 PROCEDURE — 84132 ASSAY OF SERUM POTASSIUM: CPT

## 2021-03-16 PROCEDURE — 85025 COMPLETE CBC W/AUTO DIFF WBC: CPT

## 2021-03-16 PROCEDURE — 97530 THERAPEUTIC ACTIVITIES: CPT

## 2021-03-16 PROCEDURE — 82746 ASSAY OF FOLIC ACID SERUM: CPT

## 2021-06-04 NOTE — PHYSICAL THERAPY INITIAL EVALUATION ADULT - TRANSFER SKILLS, REHAB EVAL
well developed, well nourished , in no acute distress , ambulating without difficulty , normal communication ability independent

## 2021-06-20 NOTE — PHYSICAL THERAPY INITIAL EVALUATION ADULT - RANGE OF MOTION EXAMINATION, REHAB EVAL
Female
bilateral upper extremity ROM was WFL (within functional limits)/bilateral lower extremity ROM was WFL (within functional limits)

## 2021-10-27 ENCOUNTER — INPATIENT (INPATIENT)
Facility: HOSPITAL | Age: 81
LOS: 3 days | Discharge: HOME | DRG: 202 | End: 2021-10-31
Attending: INTERNAL MEDICINE | Admitting: HOSPITALIST
Payer: MEDICARE

## 2021-10-27 VITALS — HEIGHT: 61 IN

## 2021-10-27 DIAGNOSIS — Z96.641 PRESENCE OF RIGHT ARTIFICIAL HIP JOINT: Chronic | ICD-10-CM

## 2021-10-27 DIAGNOSIS — Z29.9 ENCOUNTER FOR PROPHYLACTIC MEASURES, UNSPECIFIED: ICD-10-CM

## 2021-10-27 DIAGNOSIS — E03.9 HYPOTHYROIDISM, UNSPECIFIED: ICD-10-CM

## 2021-10-27 DIAGNOSIS — E87.1 HYPO-OSMOLALITY AND HYPONATREMIA: ICD-10-CM

## 2021-10-27 DIAGNOSIS — E78.5 HYPERLIPIDEMIA, UNSPECIFIED: ICD-10-CM

## 2021-10-27 DIAGNOSIS — I10 ESSENTIAL (PRIMARY) HYPERTENSION: ICD-10-CM

## 2021-10-27 DIAGNOSIS — J18.9 PNEUMONIA, UNSPECIFIED ORGANISM: ICD-10-CM

## 2021-10-27 DIAGNOSIS — Z98.41 CATARACT EXTRACTION STATUS, RIGHT EYE: Chronic | ICD-10-CM

## 2021-10-27 LAB
ALBUMIN SERPL ELPH-MCNC: 4.7 G/DL — SIGNIFICANT CHANGE UP (ref 3.3–5)
ALP SERPL-CCNC: 64 U/L — SIGNIFICANT CHANGE UP (ref 40–120)
ALT FLD-CCNC: 19 U/L — SIGNIFICANT CHANGE UP (ref 10–45)
ANION GAP SERPL CALC-SCNC: 12 MMOL/L — SIGNIFICANT CHANGE UP (ref 5–17)
ANION GAP SERPL CALC-SCNC: 15 MMOL/L — SIGNIFICANT CHANGE UP (ref 5–17)
ANION GAP SERPL CALC-SCNC: 16 MMOL/L — SIGNIFICANT CHANGE UP (ref 5–17)
APTT BLD: 31.8 SEC — SIGNIFICANT CHANGE UP (ref 27.5–35.5)
AST SERPL-CCNC: 23 U/L — SIGNIFICANT CHANGE UP (ref 10–40)
BASE EXCESS BLDV CALC-SCNC: -1.6 MMOL/L — SIGNIFICANT CHANGE UP (ref -2–2)
BASE EXCESS BLDV CALC-SCNC: -1.6 MMOL/L — SIGNIFICANT CHANGE UP (ref -2–2)
BASE EXCESS BLDV CALC-SCNC: 0.2 MMOL/L — SIGNIFICANT CHANGE UP (ref -2–2)
BASOPHILS # BLD AUTO: 0.07 K/UL — SIGNIFICANT CHANGE UP (ref 0–0.2)
BASOPHILS NFR BLD AUTO: 0.5 % — SIGNIFICANT CHANGE UP (ref 0–2)
BILIRUB SERPL-MCNC: 0.9 MG/DL — SIGNIFICANT CHANGE UP (ref 0.2–1.2)
BUN SERPL-MCNC: 11 MG/DL — SIGNIFICANT CHANGE UP (ref 7–23)
BUN SERPL-MCNC: 11 MG/DL — SIGNIFICANT CHANGE UP (ref 7–23)
BUN SERPL-MCNC: 22 MG/DL — SIGNIFICANT CHANGE UP (ref 7–23)
CA-I SERPL-SCNC: 1.15 MMOL/L — SIGNIFICANT CHANGE UP (ref 1.15–1.33)
CA-I SERPL-SCNC: 1.16 MMOL/L — SIGNIFICANT CHANGE UP (ref 1.15–1.33)
CA-I SERPL-SCNC: 1.21 MMOL/L — SIGNIFICANT CHANGE UP (ref 1.15–1.33)
CALCIUM SERPL-MCNC: 8.3 MG/DL — LOW (ref 8.4–10.5)
CALCIUM SERPL-MCNC: 9 MG/DL — SIGNIFICANT CHANGE UP (ref 8.4–10.5)
CALCIUM SERPL-MCNC: 9.1 MG/DL — SIGNIFICANT CHANGE UP (ref 8.4–10.5)
CHLORIDE BLDV-SCNC: 88 MMOL/L — LOW (ref 96–108)
CHLORIDE BLDV-SCNC: 88 MMOL/L — LOW (ref 96–108)
CHLORIDE BLDV-SCNC: 90 MMOL/L — LOW (ref 96–108)
CHLORIDE SERPL-SCNC: 89 MMOL/L — LOW (ref 96–108)
CHLORIDE SERPL-SCNC: 90 MMOL/L — LOW (ref 96–108)
CHLORIDE SERPL-SCNC: 95 MMOL/L — LOW (ref 96–108)
CLOSURE TME COLL+EPINEP BLD: 157 K/UL — SIGNIFICANT CHANGE UP (ref 150–400)
CO2 BLDV-SCNC: 26 MMOL/L — SIGNIFICANT CHANGE UP (ref 22–26)
CO2 BLDV-SCNC: 27 MMOL/L — HIGH (ref 22–26)
CO2 BLDV-SCNC: 29 MMOL/L — HIGH (ref 22–26)
CO2 SERPL-SCNC: 19 MMOL/L — LOW (ref 22–31)
CO2 SERPL-SCNC: 20 MMOL/L — LOW (ref 22–31)
CO2 SERPL-SCNC: 20 MMOL/L — LOW (ref 22–31)
CREAT SERPL-MCNC: 0.57 MG/DL — SIGNIFICANT CHANGE UP (ref 0.5–1.3)
CREAT SERPL-MCNC: 0.63 MG/DL — SIGNIFICANT CHANGE UP (ref 0.5–1.3)
CREAT SERPL-MCNC: 0.84 MG/DL — SIGNIFICANT CHANGE UP (ref 0.5–1.3)
EOSINOPHIL # BLD AUTO: 0.42 K/UL — SIGNIFICANT CHANGE UP (ref 0–0.5)
EOSINOPHIL NFR BLD AUTO: 3 % — SIGNIFICANT CHANGE UP (ref 0–6)
GAS PNL BLDV: 116 MMOL/L — CRITICAL LOW (ref 136–145)
GAS PNL BLDV: 120 MMOL/L — CRITICAL LOW (ref 136–145)
GAS PNL BLDV: 120 MMOL/L — CRITICAL LOW (ref 136–145)
GAS PNL BLDV: SIGNIFICANT CHANGE UP
GLUCOSE BLDV-MCNC: 155 MG/DL — HIGH (ref 70–99)
GLUCOSE BLDV-MCNC: 163 MG/DL — HIGH (ref 70–99)
GLUCOSE BLDV-MCNC: 190 MG/DL — HIGH (ref 70–99)
GLUCOSE SERPL-MCNC: 145 MG/DL — HIGH (ref 70–99)
GLUCOSE SERPL-MCNC: 150 MG/DL — HIGH (ref 70–99)
GLUCOSE SERPL-MCNC: 172 MG/DL — HIGH (ref 70–99)
HCO3 BLDV-SCNC: 24 MMOL/L — SIGNIFICANT CHANGE UP (ref 22–29)
HCO3 BLDV-SCNC: 25 MMOL/L — SIGNIFICANT CHANGE UP (ref 22–29)
HCO3 BLDV-SCNC: 27 MMOL/L — SIGNIFICANT CHANGE UP (ref 22–29)
HCT VFR BLD CALC: 42.7 % — SIGNIFICANT CHANGE UP (ref 34.5–45)
HCT VFR BLD CALC: 43.1 % — SIGNIFICANT CHANGE UP (ref 34.5–45)
HCT VFR BLDA CALC: 45 % — SIGNIFICANT CHANGE UP (ref 34.5–46.5)
HCT VFR BLDA CALC: 45 % — SIGNIFICANT CHANGE UP (ref 34.5–46.5)
HCT VFR BLDA CALC: 46 % — SIGNIFICANT CHANGE UP (ref 34.5–46.5)
HGB BLD CALC-MCNC: 15 G/DL — SIGNIFICANT CHANGE UP (ref 11.7–16.1)
HGB BLD CALC-MCNC: 15 G/DL — SIGNIFICANT CHANGE UP (ref 11.7–16.1)
HGB BLD CALC-MCNC: 15.3 G/DL — SIGNIFICANT CHANGE UP (ref 11.7–16.1)
HGB BLD-MCNC: 14.6 G/DL — SIGNIFICANT CHANGE UP (ref 11.5–15.5)
HGB BLD-MCNC: 14.8 G/DL — SIGNIFICANT CHANGE UP (ref 11.5–15.5)
IMM GRANULOCYTES NFR BLD AUTO: 0.4 % — SIGNIFICANT CHANGE UP (ref 0–1.5)
INR BLD: 0.97 RATIO — SIGNIFICANT CHANGE UP (ref 0.88–1.16)
LACTATE BLDV-MCNC: 1 MMOL/L — SIGNIFICANT CHANGE UP (ref 0.7–2)
LACTATE BLDV-MCNC: 1.4 MMOL/L — SIGNIFICANT CHANGE UP (ref 0.7–2)
LACTATE BLDV-MCNC: 1.6 MMOL/L — SIGNIFICANT CHANGE UP (ref 0.7–2)
LYMPHOCYTES # BLD AUTO: 26.8 % — SIGNIFICANT CHANGE UP (ref 13–44)
LYMPHOCYTES # BLD AUTO: 3.73 K/UL — HIGH (ref 1–3.3)
MAGNESIUM SERPL-MCNC: 1.8 MG/DL — SIGNIFICANT CHANGE UP (ref 1.6–2.6)
MCHC RBC-ENTMCNC: 31.4 PG — SIGNIFICANT CHANGE UP (ref 27–34)
MCHC RBC-ENTMCNC: 31.5 PG — SIGNIFICANT CHANGE UP (ref 27–34)
MCHC RBC-ENTMCNC: 33.9 GM/DL — SIGNIFICANT CHANGE UP (ref 32–36)
MCHC RBC-ENTMCNC: 34.7 GM/DL — SIGNIFICANT CHANGE UP (ref 32–36)
MCV RBC AUTO: 90.7 FL — SIGNIFICANT CHANGE UP (ref 80–100)
MCV RBC AUTO: 92.9 FL — SIGNIFICANT CHANGE UP (ref 80–100)
MONOCYTES # BLD AUTO: 0.87 K/UL — SIGNIFICANT CHANGE UP (ref 0–0.9)
MONOCYTES NFR BLD AUTO: 6.2 % — SIGNIFICANT CHANGE UP (ref 2–14)
NEUTROPHILS # BLD AUTO: 8.8 K/UL — HIGH (ref 1.8–7.4)
NEUTROPHILS NFR BLD AUTO: 63.1 % — SIGNIFICANT CHANGE UP (ref 43–77)
NRBC # BLD: 0 /100 WBCS — SIGNIFICANT CHANGE UP (ref 0–0)
NRBC # BLD: 0 /100 WBCS — SIGNIFICANT CHANGE UP (ref 0–0)
NT-PROBNP SERPL-SCNC: 91 PG/ML — SIGNIFICANT CHANGE UP (ref 0–300)
OSMOLALITY UR: 457 MOS/KG — SIGNIFICANT CHANGE UP (ref 300–900)
PCO2 BLDV: 42 MMHG — SIGNIFICANT CHANGE UP (ref 39–42)
PCO2 BLDV: 44 MMHG — HIGH (ref 39–42)
PCO2 BLDV: 62 MMHG — HIGH (ref 39–42)
PH BLDV: 7.25 — LOW (ref 7.32–7.43)
PH BLDV: 7.35 — SIGNIFICANT CHANGE UP (ref 7.32–7.43)
PH BLDV: 7.39 — SIGNIFICANT CHANGE UP (ref 7.32–7.43)
PLATELET # BLD AUTO: SIGNIFICANT CHANGE UP K/UL (ref 150–400)
PLATELET # BLD AUTO: SIGNIFICANT CHANGE UP K/UL (ref 150–400)
PO2 BLDV: 272 MMHG — HIGH (ref 25–45)
PO2 BLDV: 59 MMHG — HIGH (ref 25–45)
PO2 BLDV: 67 MMHG — HIGH (ref 25–45)
POTASSIUM BLDV-SCNC: 4 MMOL/L — SIGNIFICANT CHANGE UP (ref 3.5–5.1)
POTASSIUM BLDV-SCNC: 4.2 MMOL/L — SIGNIFICANT CHANGE UP (ref 3.5–5.1)
POTASSIUM BLDV-SCNC: 6.2 MMOL/L — CRITICAL HIGH (ref 3.5–5.1)
POTASSIUM SERPL-MCNC: 4.1 MMOL/L — SIGNIFICANT CHANGE UP (ref 3.5–5.3)
POTASSIUM SERPL-MCNC: 4.2 MMOL/L — SIGNIFICANT CHANGE UP (ref 3.5–5.3)
POTASSIUM SERPL-MCNC: 5 MMOL/L — SIGNIFICANT CHANGE UP (ref 3.5–5.3)
POTASSIUM SERPL-SCNC: 4.1 MMOL/L — SIGNIFICANT CHANGE UP (ref 3.5–5.3)
POTASSIUM SERPL-SCNC: 4.2 MMOL/L — SIGNIFICANT CHANGE UP (ref 3.5–5.3)
POTASSIUM SERPL-SCNC: 5 MMOL/L — SIGNIFICANT CHANGE UP (ref 3.5–5.3)
PROT SERPL-MCNC: 7 G/DL — SIGNIFICANT CHANGE UP (ref 6–8.3)
PROTHROM AB SERPL-ACNC: 11.7 SEC — SIGNIFICANT CHANGE UP (ref 10.6–13.6)
RAPID RVP RESULT: SIGNIFICANT CHANGE UP
RBC # BLD: 4.64 M/UL — SIGNIFICANT CHANGE UP (ref 3.8–5.2)
RBC # BLD: 4.71 M/UL — SIGNIFICANT CHANGE UP (ref 3.8–5.2)
RBC # FLD: 11.8 % — SIGNIFICANT CHANGE UP (ref 10.3–14.5)
RBC # FLD: 11.9 % — SIGNIFICANT CHANGE UP (ref 10.3–14.5)
SAO2 % BLDV: 100 % — HIGH (ref 67–88)
SAO2 % BLDV: 91.4 % — HIGH (ref 67–88)
SAO2 % BLDV: 94 % — HIGH (ref 67–88)
SARS-COV-2 RNA SPEC QL NAA+PROBE: SIGNIFICANT CHANGE UP
SODIUM SERPL-SCNC: 124 MMOL/L — LOW (ref 135–145)
SODIUM SERPL-SCNC: 125 MMOL/L — LOW (ref 135–145)
SODIUM SERPL-SCNC: 127 MMOL/L — LOW (ref 135–145)
SODIUM UR-SCNC: 95 MMOL/L — SIGNIFICANT CHANGE UP
TROPONIN T, HIGH SENSITIVITY RESULT: <6 NG/L — SIGNIFICANT CHANGE UP (ref 0–51)
WBC # BLD: 13.94 K/UL — HIGH (ref 3.8–10.5)
WBC # BLD: 6.29 K/UL — SIGNIFICANT CHANGE UP (ref 3.8–10.5)
WBC # FLD AUTO: 13.94 K/UL — HIGH (ref 3.8–10.5)
WBC # FLD AUTO: 6.29 K/UL — SIGNIFICANT CHANGE UP (ref 3.8–10.5)

## 2021-10-27 PROCEDURE — 93010 ELECTROCARDIOGRAM REPORT: CPT

## 2021-10-27 PROCEDURE — 99223 1ST HOSP IP/OBS HIGH 75: CPT

## 2021-10-27 PROCEDURE — 99291 CRITICAL CARE FIRST HOUR: CPT

## 2021-10-27 PROCEDURE — 71045 X-RAY EXAM CHEST 1 VIEW: CPT | Mod: 26

## 2021-10-27 RX ORDER — VITAMIN E 100 UNIT
1 CAPSULE ORAL
Qty: 0 | Refills: 0 | DISCHARGE

## 2021-10-27 RX ORDER — THIAMINE MONONITRATE (VIT B1) 100 MG
1 TABLET ORAL
Qty: 0 | Refills: 0 | DISCHARGE

## 2021-10-27 RX ORDER — ASCORBIC ACID 60 MG
1 TABLET,CHEWABLE ORAL
Qty: 0 | Refills: 0 | DISCHARGE

## 2021-10-27 RX ORDER — SODIUM CHLORIDE 0.65 %
1 AEROSOL, SPRAY (ML) NASAL AT BEDTIME
Refills: 0 | Status: DISCONTINUED | OUTPATIENT
Start: 2021-10-27 | End: 2021-10-28

## 2021-10-27 RX ORDER — CARVEDILOL PHOSPHATE 80 MG/1
1 CAPSULE, EXTENDED RELEASE ORAL
Qty: 0 | Refills: 0 | DISCHARGE

## 2021-10-27 RX ORDER — CHOLECALCIFEROL (VITAMIN D3) 125 MCG
1 CAPSULE ORAL
Qty: 0 | Refills: 0 | DISCHARGE

## 2021-10-27 RX ORDER — CARVEDILOL PHOSPHATE 80 MG/1
6.25 CAPSULE, EXTENDED RELEASE ORAL EVERY 12 HOURS
Refills: 0 | Status: DISCONTINUED | OUTPATIENT
Start: 2021-10-27 | End: 2021-10-31

## 2021-10-27 RX ORDER — ATORVASTATIN CALCIUM 80 MG/1
40 TABLET, FILM COATED ORAL AT BEDTIME
Refills: 0 | Status: DISCONTINUED | OUTPATIENT
Start: 2021-10-27 | End: 2021-10-31

## 2021-10-27 RX ORDER — MULTIVIT-MIN/FERROUS GLUCONATE 9 MG/15 ML
1 LIQUID (ML) ORAL
Qty: 0 | Refills: 0 | DISCHARGE

## 2021-10-27 RX ORDER — TRAVOPROST 0.04 MG/ML
1 SOLUTION/ DROPS OPHTHALMIC
Qty: 0 | Refills: 0 | DISCHARGE

## 2021-10-27 RX ORDER — LATANOPROST 0.05 MG/ML
1 SOLUTION/ DROPS OPHTHALMIC; TOPICAL
Qty: 0 | Refills: 0 | DISCHARGE

## 2021-10-27 RX ORDER — POLYETHYLENE GLYCOL 3350 17 G/17G
17 POWDER, FOR SOLUTION ORAL DAILY
Refills: 0 | Status: DISCONTINUED | OUTPATIENT
Start: 2021-10-27 | End: 2021-10-28

## 2021-10-27 RX ORDER — TIMOLOL 0.5 %
1 DROPS OPHTHALMIC (EYE)
Qty: 0 | Refills: 0 | DISCHARGE

## 2021-10-27 RX ORDER — LATANOPROST 0.05 MG/ML
1 SOLUTION/ DROPS OPHTHALMIC; TOPICAL AT BEDTIME
Refills: 0 | Status: DISCONTINUED | OUTPATIENT
Start: 2021-10-27 | End: 2021-10-31

## 2021-10-27 RX ORDER — ENOXAPARIN SODIUM 100 MG/ML
40 INJECTION SUBCUTANEOUS EVERY 24 HOURS
Refills: 0 | Status: DISCONTINUED | OUTPATIENT
Start: 2021-10-27 | End: 2021-10-27

## 2021-10-27 RX ORDER — IPRATROPIUM/ALBUTEROL SULFATE 18-103MCG
3 AEROSOL WITH ADAPTER (GRAM) INHALATION ONCE
Refills: 0 | Status: COMPLETED | OUTPATIENT
Start: 2021-10-27 | End: 2021-10-27

## 2021-10-27 RX ORDER — SODIUM CHLORIDE 9 MG/ML
1000 INJECTION INTRAMUSCULAR; INTRAVENOUS; SUBCUTANEOUS ONCE
Refills: 0 | Status: COMPLETED | OUTPATIENT
Start: 2021-10-27 | End: 2021-10-27

## 2021-10-27 RX ORDER — ACETAMINOPHEN 500 MG
650 TABLET ORAL EVERY 6 HOURS
Refills: 0 | Status: DISCONTINUED | OUTPATIENT
Start: 2021-10-27 | End: 2021-10-31

## 2021-10-27 RX ORDER — LANOLIN ALCOHOL/MO/W.PET/CERES
3 CREAM (GRAM) TOPICAL ONCE
Refills: 0 | Status: COMPLETED | OUTPATIENT
Start: 2021-10-27 | End: 2021-10-27

## 2021-10-27 RX ORDER — ATORVASTATIN CALCIUM 80 MG/1
1 TABLET, FILM COATED ORAL
Qty: 0 | Refills: 0 | DISCHARGE

## 2021-10-27 RX ORDER — IPRATROPIUM/ALBUTEROL SULFATE 18-103MCG
3 AEROSOL WITH ADAPTER (GRAM) INHALATION EVERY 6 HOURS
Refills: 0 | Status: DISCONTINUED | OUTPATIENT
Start: 2021-10-27 | End: 2021-10-31

## 2021-10-27 RX ORDER — DORZOLAMIDE HYDROCHLORIDE TIMOLOL MALEATE 20; 5 MG/ML; MG/ML
1 SOLUTION/ DROPS OPHTHALMIC
Refills: 0 | Status: DISCONTINUED | OUTPATIENT
Start: 2021-10-27 | End: 2021-10-31

## 2021-10-27 RX ORDER — SENNA PLUS 8.6 MG/1
2 TABLET ORAL AT BEDTIME
Refills: 0 | Status: DISCONTINUED | OUTPATIENT
Start: 2021-10-27 | End: 2021-10-31

## 2021-10-27 RX ORDER — DORZOLAMIDE HYDROCHLORIDE TIMOLOL MALEATE 20; 5 MG/ML; MG/ML
1 SOLUTION/ DROPS OPHTHALMIC
Qty: 0 | Refills: 0 | DISCHARGE

## 2021-10-27 RX ORDER — FLUTICASONE PROPIONATE 50 MCG
1 SPRAY, SUSPENSION NASAL
Qty: 0 | Refills: 0 | DISCHARGE

## 2021-10-27 RX ADMIN — LATANOPROST 1 DROP(S): 0.05 SOLUTION/ DROPS OPHTHALMIC; TOPICAL at 22:10

## 2021-10-27 RX ADMIN — DORZOLAMIDE HYDROCHLORIDE TIMOLOL MALEATE 1 DROP(S): 20; 5 SOLUTION/ DROPS OPHTHALMIC at 18:41

## 2021-10-27 RX ADMIN — SENNA PLUS 2 TABLET(S): 8.6 TABLET ORAL at 22:09

## 2021-10-27 RX ADMIN — Medication 3 MILLILITER(S): at 17:50

## 2021-10-27 RX ADMIN — Medication 3 MILLILITER(S): at 11:30

## 2021-10-27 RX ADMIN — Medication 3 MILLILITER(S): at 04:30

## 2021-10-27 RX ADMIN — Medication 3 MILLILITER(S): at 02:35

## 2021-10-27 RX ADMIN — Medication 1 TABLET(S): at 10:35

## 2021-10-27 RX ADMIN — ATORVASTATIN CALCIUM 40 MILLIGRAM(S): 80 TABLET, FILM COATED ORAL at 22:10

## 2021-10-27 RX ADMIN — Medication 3 MILLIGRAM(S): at 22:09

## 2021-10-27 RX ADMIN — Medication 1 SPRAY(S): at 22:10

## 2021-10-27 RX ADMIN — Medication 125 MILLIGRAM(S): at 02:36

## 2021-10-27 RX ADMIN — SODIUM CHLORIDE 1000 MILLILITER(S): 9 INJECTION INTRAMUSCULAR; INTRAVENOUS; SUBCUTANEOUS at 07:59

## 2021-10-27 RX ADMIN — CARVEDILOL PHOSPHATE 6.25 MILLIGRAM(S): 80 CAPSULE, EXTENDED RELEASE ORAL at 18:41

## 2021-10-27 RX ADMIN — Medication 3 MILLILITER(S): at 03:18

## 2021-10-27 NOTE — ED ADULT NURSE REASSESSMENT NOTE - NS ED NURSE REASSESS COMMENT FT1
patient bending arm therefore NS not infusing. patient instructed to keep arm straight. verbalized understanding. to repeat blood work after fluids

## 2021-10-27 NOTE — H&P ADULT - NSHPLABSRESULTS_GEN_ALL_CORE
14.6   13.94 )-----------( Clumped    ( 27 Oct 2021 01:05 )             43.1   10-27    125<L>  |  89<L>  |  11  ----------------------------<  150<H>  4.2   |  20<L>  |  0.63    Ca    9.1      27 Oct 2021 01:05  Mg     1.8     10-27    TPro  7.0  /  Alb  4.7  /  TBili  0.9  /  DBili  x   /  AST  23  /  ALT  19  /  AlkPhos  64  10-27    ECG

## 2021-10-27 NOTE — ED PROVIDER NOTE - NS ED ROS FT
GENERAL: No fever or chills  EYES: no change in vision  HEENT: no trouble swallowing or speaking  CARDIAC: no chest pain or palpiations   PULMONARY: SOB  GI: no abdominal pain, nausea, vomiting, diarrhea, or constipation   : No changes in urination  SKIN: no rashes  NEURO: no headache, numbness, or weakness.  MSK: No joint pain

## 2021-10-27 NOTE — H&P ADULT - NSHPPHYSICALEXAM_GEN_ALL_CORE
Vital Signs Last 24 Hrs  T(C): 36.9 (27 Oct 2021 07:11), Max: 36.9 (27 Oct 2021 07:11)  T(F): 98.5 (27 Oct 2021 07:11), Max: 98.5 (27 Oct 2021 07:11)  HR: 79 (27 Oct 2021 07:11) (79 - 98)  BP: 145/88 (27 Oct 2021 07:11) (145/88 - 170/80)  BP(mean): --  RR: 19 (27 Oct 2021 07:11) (19 - 25)  SpO2: 94% (27 Oct 2021 07:11) (94% - 99%)

## 2021-10-27 NOTE — ED PROVIDER NOTE - OBJECTIVE STATEMENT
82 yo F with hx of htn and sob presents with acute sob for 1 day with tachypnea and hypoxia as per EMS and placed on Cpap by EMS. Denies chest pain, fever or cough. However,  is sick with an upper resp infection. Covid vaccinated. No leg edema. Received nitrox4 by EMS.

## 2021-10-27 NOTE — ED ADULT NURSE REASSESSMENT NOTE - NS ED NURSE REASSESS COMMENT FT1
received report from MARK Ace. patient is AAOx3. denies pain at this time. VSS. on 4L NC O2= 94% tolerating well. denies pain at this time. pending blood work. admitted and waiting for bed, aware of plan of care. call bell in reach, will continue to monitor.

## 2021-10-27 NOTE — H&P ADULT - HISTORY OF PRESENT ILLNESS
81 year old female PMHX HTN, Glaucoma, HLD presented with one day history of SOB and wheezing.  Patient states she was in usual state of health until 10/26, when she started to feel wheezing after breakfast. She made her  breakfast and hten played bridge from 1-3, but after bridge game, she could not cath her breath. Patient tried to take robitussin as well as make hot water with lemon and inhale the steam, but she was continuing to worsen. Unable to eat her lunch or dinner due to lack of appetite.  Came to ED for further evaluation. Per patient no cough, no fever, chills. +sick contact in , had viral illness 2 weeks prior, with residual post-viral tussis at this time. No recent travel. No diarrhea, abodminal pain.   In ED, found to be hypercarbic with pH 7.25, placed on BIPAP, given solumedrol 125mg and duonebs along with levaquin.  Patient when I saw patient in AM states felt much improved.

## 2021-10-27 NOTE — PATIENT PROFILE ADULT - NSPROPTRIGHTCAREGIVER_GEN_A_NUR
Discharge Planning Assessment  [facility]     Patient Name: Radha Win  MRN: [id number]  Today's Date: 11/4/2020    Admit Date: 11/3/2020    Discharge Needs Assessment     Row Name 11/04/20 1623       Living Environment    Lives With  alone    Current Living Arrangements  home/apartment/condo    Primary Care Provided by  self    Provides Primary Care For  no one    Family Caregiver if Needed  child(honorio), adult    Family Caregiver Names  son, Fabio Win, [phone]    Quality of Family Relationships  helpful;involved;supportive    Able to Return to Prior Arrangements  yes       Resource/Environmental Concerns    Resource/Environmental Concerns  none       Transition Planning    Patient/Family Anticipates Transition to  home    Patient/Family Anticipated Services at Transition  none    Transportation Anticipated  family or friend will provide       Discharge Needs Assessment    Equipment Currently Used at Home  none    Concerns to be Addressed  discharge planning    Discharge Coordination/Progress  Home        Discharge Plan     Row Name 11/04/20 1626       Plan    Plan  Home, follow for needs    Provided Post Acute Provider List?  Refused    Refused Provider List Comment  Does not anticipate needs    Patient/Family in Agreement with Plan  yes    Plan Comments  IMM letter checked. CCP spoke with pt's son (Fabio Win, [phone], POA on file) to verify information and discuss post hospital planning. Pt resides alone in a single level home, uses no DME, and has no h/o home health or sub-acute rehab. Pt uses [facility] [place]. Pt's son does not anticipate post hospital needs. CCP to follow to assist should needs arise. Thania Meehan LCSW        Continued Care and Services - Admitted Since 11/3/2020    Coordination has not been started for this encounter.         Demographic Summary     Row Name 11/04/20 1625       General Information    Admission Type  inpatient    Arrived From  home     Required Notices Provided  Important Message from Medicare    Referral Source  admission list    Reason for Consult  discharge planning    Preferred Language  English        Functional Status     Row Name 11/04/20 1623       Functional Status    Usual Activity Tolerance  good    Current Activity Tolerance  good       Functional Status, IADL    Medications  independent    Meal Preparation  independent    Housekeeping  independent    Laundry  independent    Shopping  independent       Mental Status Summary    Recent Changes in Mental Status/Cognitive Functioning  no changes        Psychosocial    No documentation.       Abuse/Neglect    No documentation.       Legal    No documentation.       Substance Abuse    No documentation.       Patient Forms    No documentation.           Catherine Meehan LCSW     no

## 2021-10-27 NOTE — H&P ADULT - PROBLEM SELECTOR PLAN 1
-patient with hypoxia, leukocytosis and CXR with possible consolidation.   -negative RVP does not exclude viral pna,but given constellation of symptoms will treat for CAP  -patient with allergy for PCN (per patient as a child, does not rememebr reaction), but given hyponatremia, I am ok with monotherapy with levaquin as will also cover legionella.   -CAP coverage for 5 days  -duonebs ATC  -titrate off O2  -urine legionella given hyponatremia and decreased appetite.   -no need for CT chest at this time as will not .  -hold off further steroid dosing as even with  smoking, patient herself with no prior history of COPD, Asthma or reactive lung disease.  -repeat VBG in AM to ensure no worsening hypercarbia off of BIPAP.

## 2021-10-27 NOTE — H&P ADULT - PROBLEM SELECTOR PLAN 2
unclear etiology. PAtient with acute decreased appetite, did have more fluids in last 24 hours.  -will check Matt, UOsm.   -for now as not hypotensive and appers euvolemic will hold off on fluids in case this is SIADH.  -repeat BMP this AM.

## 2021-10-27 NOTE — ED ADULT NURSE REASSESSMENT NOTE - NS ED NURSE REASSESS COMMENT FT1
patient resting comfortably in bed in no acute distress. on 4L NC tolerating well. actual body weight in flow sheets. call bell in reach, will continue to monitor

## 2021-10-27 NOTE — H&P ADULT - ASSESSMENT
80 yo female PMHX HTN, HLD, seasonal allergies presents with one day of hypoxia admitted for pneumonia

## 2021-10-27 NOTE — ED PROVIDER NOTE - ATTENDING CONTRIBUTION TO CARE
RGUJRAL 82yo f hx HTN, HLD BIB EMS for SOB x 1 day. States SOB started this morning. As per EMS pt was found to be tachypneic and given 5 SL NTG and ASA 324mg and placed on CPAP 10. Pt denies any chest pain, palp, recent illness, travel, hx DVT/PE. No abd pain, n/v. + Sick contact  at home. Pt is vaccinated and had booster.   On exam, Patient is awake, alert and oriented x 3.  Patient is in moderate respiratory distress  NCAT  Lungs shallow breaths with dec bs at bases,+S1S2 no murmurs,  Abdomen:Soft nd/nt+bs no rebound or guarding.  Extremity no edema or calf tender.  Skin with no rash.  Pt placed on Bipap states she feels better. Denies any hx of Asthma, COPD. Afebrile in ED.  Check labs, Xray chest to eval for Pulm edema, PNA.     On re eval pt noted to be wheezing, now endorses she was wheezing this morning. Given steroid and duonebs and RR and symptoms improved.

## 2021-10-27 NOTE — ED PROVIDER NOTE - PHYSICAL EXAMINATION
Gen: AAOx3, non-toxic  Head: NCAT  HEENT: EOMI, oral mucosa moist, normal conjunctiva  Lung: CTAB,, decreased breath sounds, no obvious crackles or wheezing on initial examination,  speaking in full sentences  CV: RRR, no murmurs, rubs or gallops. No leg edema.   Abd: soft, NTND, no guarding, no CVA tenderness  MSK: no visible deformities  Neuro: No focal sensory or motor deficits, normal CN exam   Skin: Warm, well perfused, no rash  Psych: normal affect.

## 2021-10-27 NOTE — ED PROVIDER NOTE - CLINICAL SUMMARY MEDICAL DECISION MAKING FREE TEXT BOX
2 yo F with hx of htn and sob presents with acute sob for 1 day with tachypnea and hypoxia as per EMS and placed on Cpap by EMS. CTAB, decreased breath sounds, no obvious crackles or wheezing on initial examination,  speaking in full sentences. Hypertensive, tachypneic.  Ddx viral / bacterial pneumonia vs hypertensive emergency vs CHF vs atypical ACS. Cardiac work up, CXR, bipap. 82 yo F with hx of htn and sob presents with acute sob for 1 day with tachypnea and hypoxia as per EMS and placed on Cpap by EMS. CTAB, decreased breath sounds, no obvious crackles or wheezing on initial examination,  speaking in full sentences. Hypertensive, tachypneic.  Ddx viral / bacterial pneumonia vs hypertensive emergency vs CHF vs atypical ACS. Cardiac work up, CXR, bipap.

## 2021-10-27 NOTE — H&P ADULT - RESPIRATORY COMMENTS
Unoffiical POCUS with focal B-line in left posterior exam approxiamtely 6th intercostal space. A-line predominant anterior. No evidence of effusion. I could not visualize consolidatoin pattern in RLL

## 2021-10-27 NOTE — ED PROVIDER NOTE - PROGRESS NOTE DETAILS
DIscussed case with Dr Martin Steven's team who requests patient to be admitted under the full time hospitalist. Patient with significant respiratory improvement after duonebs. RLL opacity; will treat for pneumonia.

## 2021-10-27 NOTE — ED ADULT NURSE NOTE - OBJECTIVE STATEMENT
80 y/o F A&Ox3 PMH glaucoma, HTN, HLD, hypothyroid denies pertinent PSH presents to the ED from home c/o SOB. Pt reports waking up this morning feeling SOB

## 2021-10-28 DIAGNOSIS — R06.2 WHEEZING: ICD-10-CM

## 2021-10-28 DIAGNOSIS — J45.909 UNSPECIFIED ASTHMA, UNCOMPLICATED: ICD-10-CM

## 2021-10-28 LAB
ANION GAP SERPL CALC-SCNC: 12 MMOL/L — SIGNIFICANT CHANGE UP (ref 5–17)
BASE EXCESS BLDV CALC-SCNC: 2 MMOL/L — SIGNIFICANT CHANGE UP (ref -2–2)
BUN SERPL-MCNC: 19 MG/DL — SIGNIFICANT CHANGE UP (ref 7–23)
CALCIUM SERPL-MCNC: 8.9 MG/DL — SIGNIFICANT CHANGE UP (ref 8.4–10.5)
CHLORIDE SERPL-SCNC: 99 MMOL/L — SIGNIFICANT CHANGE UP (ref 96–108)
CO2 BLDV-SCNC: 29 MMOL/L — HIGH (ref 22–26)
CO2 SERPL-SCNC: 23 MMOL/L — SIGNIFICANT CHANGE UP (ref 22–31)
COVID-19 SPIKE DOMAIN AB INTERP: POSITIVE
COVID-19 SPIKE DOMAIN ANTIBODY RESULT: >250 U/ML — HIGH
CREAT SERPL-MCNC: 0.79 MG/DL — SIGNIFICANT CHANGE UP (ref 0.5–1.3)
GAS PNL BLDV: SIGNIFICANT CHANGE UP
GLUCOSE SERPL-MCNC: 131 MG/DL — HIGH (ref 70–99)
HCO3 BLDV-SCNC: 27 MMOL/L — SIGNIFICANT CHANGE UP (ref 22–29)
HCT VFR BLD CALC: 39.6 % — SIGNIFICANT CHANGE UP (ref 34.5–45)
HGB BLD-MCNC: 13.9 G/DL — SIGNIFICANT CHANGE UP (ref 11.5–15.5)
LACTATE SERPL-SCNC: 0.9 MMOL/L — SIGNIFICANT CHANGE UP (ref 0.7–2)
LEGIONELLA AG UR QL: NEGATIVE — SIGNIFICANT CHANGE UP
MCHC RBC-ENTMCNC: 32.1 PG — SIGNIFICANT CHANGE UP (ref 27–34)
MCHC RBC-ENTMCNC: 35.1 GM/DL — SIGNIFICANT CHANGE UP (ref 32–36)
MCV RBC AUTO: 91.5 FL — SIGNIFICANT CHANGE UP (ref 80–100)
NRBC # BLD: 0 /100 WBCS — SIGNIFICANT CHANGE UP (ref 0–0)
PCO2 BLDV: 44 MMHG — HIGH (ref 39–42)
PH BLDV: 7.4 — SIGNIFICANT CHANGE UP (ref 7.32–7.43)
PLATELET # BLD AUTO: 63 K/UL — LOW (ref 150–400)
PO2 BLDV: 78 MMHG — HIGH (ref 25–45)
POTASSIUM SERPL-MCNC: 4 MMOL/L — SIGNIFICANT CHANGE UP (ref 3.5–5.3)
POTASSIUM SERPL-SCNC: 4 MMOL/L — SIGNIFICANT CHANGE UP (ref 3.5–5.3)
RAPID RVP RESULT: SIGNIFICANT CHANGE UP
RBC # BLD: 4.33 M/UL — SIGNIFICANT CHANGE UP (ref 3.8–5.2)
RBC # FLD: 12.2 % — SIGNIFICANT CHANGE UP (ref 10.3–14.5)
SAO2 % BLDV: 96.9 % — HIGH (ref 67–88)
SARS-COV-2 IGG+IGM SERPL QL IA: >250 U/ML — HIGH
SARS-COV-2 IGG+IGM SERPL QL IA: POSITIVE
SARS-COV-2 RNA SPEC QL NAA+PROBE: SIGNIFICANT CHANGE UP
SODIUM SERPL-SCNC: 134 MMOL/L — LOW (ref 135–145)
WBC # BLD: 9.76 K/UL — SIGNIFICANT CHANGE UP (ref 3.8–10.5)
WBC # FLD AUTO: 9.76 K/UL — SIGNIFICANT CHANGE UP (ref 3.8–10.5)

## 2021-10-28 PROCEDURE — 71250 CT THORAX DX C-: CPT | Mod: 26

## 2021-10-28 RX ORDER — SODIUM CHLORIDE 0.65 %
1 AEROSOL, SPRAY (ML) NASAL
Refills: 0 | Status: DISCONTINUED | OUTPATIENT
Start: 2021-10-28 | End: 2021-10-31

## 2021-10-28 RX ORDER — LANOLIN ALCOHOL/MO/W.PET/CERES
3 CREAM (GRAM) TOPICAL ONCE
Refills: 0 | Status: COMPLETED | OUTPATIENT
Start: 2021-10-28 | End: 2021-10-28

## 2021-10-28 RX ORDER — POLYETHYLENE GLYCOL 3350 17 G/17G
17 POWDER, FOR SOLUTION ORAL
Refills: 0 | Status: DISCONTINUED | OUTPATIENT
Start: 2021-10-28 | End: 2021-10-31

## 2021-10-28 RX ADMIN — SENNA PLUS 2 TABLET(S): 8.6 TABLET ORAL at 22:29

## 2021-10-28 RX ADMIN — CARVEDILOL PHOSPHATE 6.25 MILLIGRAM(S): 80 CAPSULE, EXTENDED RELEASE ORAL at 05:04

## 2021-10-28 RX ADMIN — Medication 3 MILLILITER(S): at 17:18

## 2021-10-28 RX ADMIN — POLYETHYLENE GLYCOL 3350 17 GRAM(S): 17 POWDER, FOR SOLUTION ORAL at 14:05

## 2021-10-28 RX ADMIN — Medication 3 MILLILITER(S): at 11:27

## 2021-10-28 RX ADMIN — CARVEDILOL PHOSPHATE 6.25 MILLIGRAM(S): 80 CAPSULE, EXTENDED RELEASE ORAL at 17:19

## 2021-10-28 RX ADMIN — ATORVASTATIN CALCIUM 40 MILLIGRAM(S): 80 TABLET, FILM COATED ORAL at 22:29

## 2021-10-28 RX ADMIN — Medication 3 MILLILITER(S): at 05:04

## 2021-10-28 RX ADMIN — POLYETHYLENE GLYCOL 3350 17 GRAM(S): 17 POWDER, FOR SOLUTION ORAL at 05:07

## 2021-10-28 RX ADMIN — Medication 1 TABLET(S): at 11:27

## 2021-10-28 RX ADMIN — Medication 3 MILLIGRAM(S): at 22:29

## 2021-10-28 RX ADMIN — LATANOPROST 1 DROP(S): 0.05 SOLUTION/ DROPS OPHTHALMIC; TOPICAL at 22:29

## 2021-10-28 RX ADMIN — Medication 3 MILLILITER(S): at 00:14

## 2021-10-28 RX ADMIN — Medication 125 MILLIGRAM(S): at 23:49

## 2021-10-28 RX ADMIN — Medication 5 MILLIGRAM(S): at 22:29

## 2021-10-28 RX ADMIN — DORZOLAMIDE HYDROCHLORIDE TIMOLOL MALEATE 1 DROP(S): 20; 5 SOLUTION/ DROPS OPHTHALMIC at 17:18

## 2021-10-28 RX ADMIN — Medication 3 MILLILITER(S): at 23:31

## 2021-10-28 RX ADMIN — Medication 3 MILLILITER(S): at 20:34

## 2021-10-28 RX ADMIN — DORZOLAMIDE HYDROCHLORIDE TIMOLOL MALEATE 1 DROP(S): 20; 5 SOLUTION/ DROPS OPHTHALMIC at 05:04

## 2021-10-28 NOTE — CONSULT NOTE ADULT - ASSESSMENT
81 year old female PMHX HTN, Glaucoma, HLD presented with one day history of SOB and wheezing. Patient states she was in usual state of health until 10/26, when she started to feel wheezing, with no improvement in her SOB, she came to ED for further evaluation.       1- Hyponatremia  2- SOB/wheeze  3- HTN      Hyponatremia suspected in setting of increased po fluid intake.   started on 1L fluid restriction and received 1L bolus NS with improvement in sodium   urine lytes not accurate in setting of collection post IVF.   continue 1L fluid restriction for now.   strict I/O  SOB/wheeze work up per pulmonary in progress  continue nebulizers  Continue coreg 6.125 mg BID
82 y/o F with PMH of HTN, Glaucoma, HLD. Presented with SOB/wheezing x1 day. Reportedly with no cough, fever, or chills on admission but notes + sick contact in her  who had viral illness 2 weeks prior.   Per ED provider note, pt afebrile with mild leukocytosis, hypertensive & tachypneic. Found to be hypercarbic with pH 7.25, placed on BIPAP, given solumedrol 125mg and Duonebs with improvement in symptoms. COVID negative on rapid RVP. BC with NGTD. ProBNP normal. Received Levaquin x2 doses for questionable PNA per primary team. CXR with bibasilar opacities. Pulmonary called to consult for SOB/wheezing. Never smoker, but exposed to secondhand smoke from . Denies any hx of lung disease, inhaler use. Notes SOB with intermittent wheeze, as well as mild chest pressure that has since resolved. Denies fever, chills, cough, sputum production. O2 sats 97% on 2LNC.

## 2021-10-28 NOTE — CONSULT NOTE ADULT - ATTENDING COMMENTS
Seen, examined, and  agree with above as scribed by NP Alisson    polydipsia likely the cause of her hyponatremia. she has significant intake of water daily approx 2.7-3 liter a day   one liter fluid restrict   na is already improving   cont coreg  pt educated
dw pt

## 2021-10-28 NOTE — CONSULT NOTE ADULT - PROBLEM SELECTOR RECOMMENDATION 9
Pt with few b/l expiratory wheeze as well as upper airway   -? 2nd to viral infection given recent viral illness in   -Check RVP  -Check CT chest  -C/w Duoneb q6h  -Keep sats >90% with supplemental O2 as needed (currently 2LNC)  -Suggest ENT consult.

## 2021-10-28 NOTE — CONSULT NOTE ADULT - PROBLEM SELECTOR RECOMMENDATION 3
CXR 10/27 with bibasilar opacities  -Pt afebrile, non-toxic appearing, mild leukocytosis on admission since resolved  -S/p two days of Levaquin per primary team for ? PNA  -Urine legionella negative   -Check Procal  -Check RVP  -Check CT chest  -Can monitor off ABX at this time pending CT chest.

## 2021-10-28 NOTE — PHYSICAL THERAPY INITIAL EVALUATION ADULT - PLANNED THERAPY INTERVENTIONS, PT EVAL
LTG 1: Stairs - Pt will be independent with negotiation of 1 flight of stairs with unilateral handrail within 4 weeks./balance training/gait training/transfer training

## 2021-10-28 NOTE — CONSULT NOTE ADULT - SUBJECTIVE AND OBJECTIVE BOX
PULMONARY CONSULT    HPI: 80 y/o F with PMH of HTN, Glaucoma, HLD. Presented with SOB/wheezing x1 day. Reportedly with no cough, fever, or chills on admission but notes + sick contact in her  who had viral illness 2 weeks prior.   Reportedly per ED provider note, pt afebrile with mild leukocytosis, hypertensive & tachypneic. Found to be hypercarbic with pH 7.25, placed on BIPAP, given solumedrol 125mg and Duonebs with improvement in symptoms. COVID negative on rapid RVP. BC with NGTD. ProBNP normal. Received Levaquin x2 doses for questionable PNA per primary team. CXR with   Pulmonary called to consult for SOB/wheezing.     PAST MEDICAL & SURGICAL HISTORY:  Osteoarthritis of hip  HTN (hypertension)  Glaucoma  HLD (hyperlipidemia)  Hypothyroid  LBP (low back pain)  S/P hip replacement, right  S/P right cataract extraction    Allergies  penicillins (Rash)  strawberry (Unknown)    FAMILY HISTORY:  No pertinent family history in first degree relatives    Social history:     Review of Systems:  CONSTITUTIONAL: No fever, chills, or fatigue  EYES: No eye pain, visual disturbances, or discharge  ENMT:  No difficulty hearing, tinnitus, vertigo; No sinus or throat pain  NECK: No pain or stiffness  RESPIRATORY: Per above  CARDIOVASCULAR: No chest pain, palpitations, dizziness, or leg swelling  GASTROINTESTINAL: No abdominal or epigastric pain. No nausea, vomiting, or hematemesis; No diarrhea or constipation. No melena or hematochezia.  GENITOURINARY: No dysuria, frequency, hematuria, or incontinence  NEUROLOGICAL: No headaches, memory loss, loss of strength, numbness, or tremors  SKIN: No itching, burning, rashes, or lesions   MUSCULOSKELETAL: No joint pain or swelling; No muscle, back, or extremity pain  PSYCHIATRIC: No depression, anxiety, mood swings, or difficulty sleeping    Medications:  MEDICATIONS  (STANDING):  albuterol/ipratropium for Nebulization 3 milliLiter(s) Nebulizer every 6 hours  atorvastatin 40 milliGRAM(s) Oral at bedtime  carvedilol 6.25 milliGRAM(s) Oral every 12 hours  dorzolamide 2%/timolol 0.5% Ophthalmic Solution 1 Drop(s) Both EYES two times a day  latanoprost 0.005% Ophthalmic Solution 1 Drop(s) Right EYE at bedtime  multivitamin 1 Tablet(s) Oral daily  senna 2 Tablet(s) Oral at bedtime    MEDICATIONS  (PRN):  acetaminophen     Tablet .. 650 milliGRAM(s) Oral every 6 hours PRN Temp greater or equal to 38C (100.4F), Mild Pain (1 - 3)  polyethylene glycol 3350 17 Gram(s) Oral daily PRN Constipation  sodium chloride 0.65% Nasal 1 Spray(s) Both Nostrils four times a day PRN Nasal Congestion    Vital Signs Last 24 Hrs  T(C): 36.5 (28 Oct 2021 08:34), Max: 37.4 (27 Oct 2021 15:57)  T(F): 97.7 (28 Oct 2021 08:34), Max: 99.3 (27 Oct 2021 15:57)  HR: 85 (28 Oct 2021 08:34) (84 - 103)  BP: 149/86 (28 Oct 2021 08:34) (119/68 - 158/-)  BP(mean): --  RR: 20 (28 Oct 2021 08:34) (18 - 22)  SpO2: 96% (28 Oct 2021 08:34) (94% - 98%)    VBG pH 7.40 10-28 @ 07:11  VBG pCO2 44 10-28 @ 07:11  VBG O2 sat 96.9 10-28 @ 07:11  VBG lactate -- 10-28 @ 07:11  VBG pH 7.39 10-27 @ 07:36  VBG pCO2 42 10-27 @ 07:36  VBG O2 sat 91.4 10-27 @ 07:36  VBG lactate 1.6 10-27 @ 07:36  VBG pH 7.35 10-27 @ 07:19  VBG pCO2 44 10-27 @ 07:19  VBG O2 sat 94.0 10-27 @ 07:19  VBG lactate 1.4 10-27 @ 07:19  VBG pH 7.25 10-27 @ 01:05  VBG pCO2 62 10-27 @ 01:05  VBG O2 sat 100.0 10-27 @ 01:05  VBG lactate 1.0 10-27 @ 01:05    10-27 @ 07:01  -  10-28 @ 07:00  --------------------------------------------------------  IN: 0 mL / OUT: 2000 mL / NET: -2000 mL    LABS:                        13.9   9.76  )-----------( See note    ( 28 Oct 2021 06:59 )             39.6     10-28    134<L>  |  99  |  19  ----------------------------<  131<H>  4.0   |  23  |  0.79    Ca    8.9      28 Oct 2021 06:59  Mg     1.8     10-27    TPro  7.0  /  Alb  4.7  /  TBili  0.9  /  DBili  x   /  AST  23  /  ALT  19  /  AlkPhos  64  10-27      PT/INR - ( 27 Oct 2021 01:05 )   PT: 11.7 sec;   INR: 0.97 ratio    PTT - ( 27 Oct 2021 01:05 )  PTT:31.8 sec      Serum Pro-Brain Natriuretic Peptide: 91 pg/mL (10-27-21 @ 01:05)      CULTURES:   (collected 10-27-21 @ 03:24)  Source: .Blood Blood-Venous  Preliminary Report (10-28-21 @ 04:01):    No growth to date.     (collected 10-27-21 @ 03:24)  Source: .Blood Blood-Peripheral  Preliminary Report (10-28-21 @ 04:01):    No growth to date.      Physical Examination:    General: No acute distress.      HEENT: Pupils equal, reactive to light.  Symmetric.    PULM:     CVS:     ABD: Soft, nondistended, nontender, normoactive bowel sounds, no masses    EXT: No edema, nontender    SKIN: Warm and well perfused, no rashes noted.    NEURO: Alert, oriented, interactive, nonfocal    RADIOLOGY REVIEWED  CXR:    CT chest:    TTE:   PULMONARY CONSULT    HPI: 80 y/o F with PMH of HTN, Glaucoma, HLD. Presented with SOB/wheezing x1 day. Reportedly with no cough, fever, or chills on admission but notes + sick contact in her  who had viral illness 2 weeks prior.   Per ED provider note, pt afebrile with mild leukocytosis, hypertensive & tachypneic. Found to be hypercarbic with pH 7.25, placed on BIPAP, given solumedrol 125mg and Duonebs with improvement in symptoms. COVID negative on rapid RVP. BC with NGTD. ProBNP normal. Received Levaquin x2 doses for questionable PNA per primary team. CXR with bibasilar opacities. Pulmonary called to consult for SOB/wheezing. Never smoker, but exposed to secondhand smoke from . Denies any hx of lung disease, inhaler use. Notes SOB with intermittent wheeze, as well as mild chest pressure that has since resolved. Denies fever, chills, cough, sputum production. O2 sats 97% on 2LNC.     PAST MEDICAL & SURGICAL HISTORY:  Osteoarthritis of hip  HTN (hypertension)  Glaucoma  HLD (hyperlipidemia)  Hypothyroid  LBP (low back pain)  S/P hip replacement, right  S/P right cataract extraction    Allergies  penicillins (Rash)  strawberry (Unknown)    FAMILY HISTORY:  No pertinent family history in first degree relatives    Social history: never smoker     Review of Systems:  CONSTITUTIONAL: No fever, chills, or fatigue  EYES: No eye pain, visual disturbances, or discharge  ENMT:  No difficulty hearing, tinnitus, vertigo; No sinus or throat pain  NECK: No pain or stiffness  RESPIRATORY: Per above  CARDIOVASCULAR: No chest pain, palpitations, dizziness, or leg swelling  GASTROINTESTINAL: No abdominal or epigastric pain. No nausea, vomiting, or hematemesis; No diarrhea or constipation. No melena or hematochezia.  GENITOURINARY: No dysuria, frequency, hematuria, or incontinence  NEUROLOGICAL: No headaches, memory loss, loss of strength, numbness, or tremors  SKIN: No itching, burning, rashes, or lesions   MUSCULOSKELETAL: No joint pain or swelling; No muscle, back, or extremity pain  PSYCHIATRIC: No depression, anxiety, mood swings, or difficulty sleeping    Medications:  MEDICATIONS  (STANDING):  albuterol/ipratropium for Nebulization 3 milliLiter(s) Nebulizer every 6 hours  atorvastatin 40 milliGRAM(s) Oral at bedtime  carvedilol 6.25 milliGRAM(s) Oral every 12 hours  dorzolamide 2%/timolol 0.5% Ophthalmic Solution 1 Drop(s) Both EYES two times a day  latanoprost 0.005% Ophthalmic Solution 1 Drop(s) Right EYE at bedtime  multivitamin 1 Tablet(s) Oral daily  senna 2 Tablet(s) Oral at bedtime    MEDICATIONS  (PRN):  acetaminophen     Tablet .. 650 milliGRAM(s) Oral every 6 hours PRN Temp greater or equal to 38C (100.4F), Mild Pain (1 - 3)  polyethylene glycol 3350 17 Gram(s) Oral daily PRN Constipation  sodium chloride 0.65% Nasal 1 Spray(s) Both Nostrils four times a day PRN Nasal Congestion    Vital Signs Last 24 Hrs  T(C): 36.5 (28 Oct 2021 08:34), Max: 37.4 (27 Oct 2021 15:57)  T(F): 97.7 (28 Oct 2021 08:34), Max: 99.3 (27 Oct 2021 15:57)  HR: 85 (28 Oct 2021 08:34) (84 - 103)  BP: 149/86 (28 Oct 2021 08:34) (119/68 - 158/-)  BP(mean): --  RR: 20 (28 Oct 2021 08:34) (18 - 22)  SpO2: 96% (28 Oct 2021 08:34) (94% - 98%)    VBG pH 7.40 10-28 @ 07:11  VBG pCO2 44 10-28 @ 07:11  VBG O2 sat 96.9 10-28 @ 07:11  VBG lactate -- 10-28 @ 07:11  VBG pH 7.39 10-27 @ 07:36  VBG pCO2 42 10-27 @ 07:36  VBG O2 sat 91.4 10-27 @ 07:36  VBG lactate 1.6 10-27 @ 07:36  VBG pH 7.35 10-27 @ 07:19  VBG pCO2 44 10-27 @ 07:19  VBG O2 sat 94.0 10-27 @ 07:19  VBG lactate 1.4 10-27 @ 07:19  VBG pH 7.25 10-27 @ 01:05  VBG pCO2 62 10-27 @ 01:05  VBG O2 sat 100.0 10-27 @ 01:05  VBG lactate 1.0 10-27 @ 01:05    10-27 @ 07:01  -  10-28 @ 07:00  --------------------------------------------------------  IN: 0 mL / OUT: 2000 mL / NET: -2000 mL    LABS:                        13.9   9.76  )-----------( See note    ( 28 Oct 2021 06:59 )             39.6     10-28    134<L>  |  99  |  19  ----------------------------<  131<H>  4.0   |  23  |  0.79    Ca    8.9      28 Oct 2021 06:59  Mg     1.8     10-27    TPro  7.0  /  Alb  4.7  /  TBili  0.9  /  DBili  x   /  AST  23  /  ALT  19  /  AlkPhos  64  10-27      PT/INR - ( 27 Oct 2021 01:05 )   PT: 11.7 sec;   INR: 0.97 ratio    PTT - ( 27 Oct 2021 01:05 )  PTT:31.8 sec      Serum Pro-Brain Natriuretic Peptide: 91 pg/mL (10-27-21 @ 01:05)    CULTURES:   (collected 10-27-21 @ 03:24)  Source: .Blood Blood-Venous  Preliminary Report (10-28-21 @ 04:01):    No growth to date.     (collected 10-27-21 @ 03:24)  Source: .Blood Blood-Peripheral  Preliminary Report (10-28-21 @ 04:01):    No growth to date.      Physical Examination:    General: No acute distress.      HEENT: Pupils equal, reactive to light.  Symmetric.    PULM: b/l expiratory wheeze     CVS: RRR    ABD: Soft, nondistended, nontender, normoactive bowel sounds, no masses    EXT: No edema, nontender    SKIN: Warm and well perfused, no rashes noted.    NEURO: Alert, oriented, interactive, nonfocal    RADIOLOGY REVIEWED  CXR: 10/27 b/l opacities

## 2021-10-29 LAB
ANION GAP SERPL CALC-SCNC: 15 MMOL/L — SIGNIFICANT CHANGE UP (ref 5–17)
BUN SERPL-MCNC: 18 MG/DL — SIGNIFICANT CHANGE UP (ref 7–23)
CALCIUM SERPL-MCNC: 9.1 MG/DL — SIGNIFICANT CHANGE UP (ref 8.4–10.5)
CHLORIDE SERPL-SCNC: 96 MMOL/L — SIGNIFICANT CHANGE UP (ref 96–108)
CO2 SERPL-SCNC: 22 MMOL/L — SIGNIFICANT CHANGE UP (ref 22–31)
CREAT SERPL-MCNC: 0.76 MG/DL — SIGNIFICANT CHANGE UP (ref 0.5–1.3)
GLUCOSE SERPL-MCNC: 161 MG/DL — HIGH (ref 70–99)
HCT VFR BLD CALC: 40.7 % — SIGNIFICANT CHANGE UP (ref 34.5–45)
HGB BLD-MCNC: 13.8 G/DL — SIGNIFICANT CHANGE UP (ref 11.5–15.5)
MCHC RBC-ENTMCNC: 31.8 PG — SIGNIFICANT CHANGE UP (ref 27–34)
MCHC RBC-ENTMCNC: 33.9 GM/DL — SIGNIFICANT CHANGE UP (ref 32–36)
MCV RBC AUTO: 93.8 FL — SIGNIFICANT CHANGE UP (ref 80–100)
NRBC # BLD: 0 /100 WBCS — SIGNIFICANT CHANGE UP (ref 0–0)
PLATELET # BLD AUTO: 75 K/UL — LOW (ref 150–400)
POTASSIUM SERPL-MCNC: 4.4 MMOL/L — SIGNIFICANT CHANGE UP (ref 3.5–5.3)
POTASSIUM SERPL-SCNC: 4.4 MMOL/L — SIGNIFICANT CHANGE UP (ref 3.5–5.3)
PROCALCITONIN SERPL-MCNC: 0.06 NG/ML — SIGNIFICANT CHANGE UP (ref 0.02–0.1)
RBC # BLD: 4.34 M/UL — SIGNIFICANT CHANGE UP (ref 3.8–5.2)
RBC # FLD: 12.4 % — SIGNIFICANT CHANGE UP (ref 10.3–14.5)
SODIUM SERPL-SCNC: 133 MMOL/L — LOW (ref 135–145)
WBC # BLD: 8.22 K/UL — SIGNIFICANT CHANGE UP (ref 3.8–10.5)
WBC # FLD AUTO: 8.22 K/UL — SIGNIFICANT CHANGE UP (ref 3.8–10.5)

## 2021-10-29 PROCEDURE — 31575 DIAGNOSTIC LARYNGOSCOPY: CPT

## 2021-10-29 PROCEDURE — 99232 SBSQ HOSP IP/OBS MODERATE 35: CPT | Mod: 25

## 2021-10-29 RX ORDER — FLUTICASONE PROPIONATE 50 MCG
1 SPRAY, SUSPENSION NASAL
Refills: 0 | Status: DISCONTINUED | OUTPATIENT
Start: 2021-10-29 | End: 2021-10-30

## 2021-10-29 RX ORDER — LANOLIN ALCOHOL/MO/W.PET/CERES
3 CREAM (GRAM) TOPICAL ONCE
Refills: 0 | Status: COMPLETED | OUTPATIENT
Start: 2021-10-29 | End: 2021-10-29

## 2021-10-29 RX ORDER — IPRATROPIUM/ALBUTEROL SULFATE 18-103MCG
3 AEROSOL WITH ADAPTER (GRAM) INHALATION ONCE
Refills: 0 | Status: COMPLETED | OUTPATIENT
Start: 2021-10-29 | End: 2021-10-29

## 2021-10-29 RX ORDER — PANTOPRAZOLE SODIUM 20 MG/1
40 TABLET, DELAYED RELEASE ORAL
Refills: 0 | Status: DISCONTINUED | OUTPATIENT
Start: 2021-10-29 | End: 2021-10-31

## 2021-10-29 RX ADMIN — ATORVASTATIN CALCIUM 40 MILLIGRAM(S): 80 TABLET, FILM COATED ORAL at 21:42

## 2021-10-29 RX ADMIN — Medication 3 MILLILITER(S): at 17:04

## 2021-10-29 RX ADMIN — SENNA PLUS 2 TABLET(S): 8.6 TABLET ORAL at 21:41

## 2021-10-29 RX ADMIN — Medication 20 MILLIGRAM(S): at 13:25

## 2021-10-29 RX ADMIN — DORZOLAMIDE HYDROCHLORIDE TIMOLOL MALEATE 1 DROP(S): 20; 5 SOLUTION/ DROPS OPHTHALMIC at 05:35

## 2021-10-29 RX ADMIN — Medication 3 MILLILITER(S): at 23:05

## 2021-10-29 RX ADMIN — CARVEDILOL PHOSPHATE 6.25 MILLIGRAM(S): 80 CAPSULE, EXTENDED RELEASE ORAL at 17:05

## 2021-10-29 RX ADMIN — Medication 1 TABLET(S): at 12:48

## 2021-10-29 RX ADMIN — Medication 3 MILLILITER(S): at 04:27

## 2021-10-29 RX ADMIN — Medication 20 MILLIGRAM(S): at 04:27

## 2021-10-29 RX ADMIN — POLYETHYLENE GLYCOL 3350 17 GRAM(S): 17 POWDER, FOR SOLUTION ORAL at 05:34

## 2021-10-29 RX ADMIN — Medication 1 SPRAY(S): at 17:05

## 2021-10-29 RX ADMIN — DORZOLAMIDE HYDROCHLORIDE TIMOLOL MALEATE 1 DROP(S): 20; 5 SOLUTION/ DROPS OPHTHALMIC at 17:05

## 2021-10-29 RX ADMIN — POLYETHYLENE GLYCOL 3350 17 GRAM(S): 17 POWDER, FOR SOLUTION ORAL at 17:04

## 2021-10-29 RX ADMIN — Medication 3 MILLIGRAM(S): at 23:05

## 2021-10-29 RX ADMIN — LATANOPROST 1 DROP(S): 0.05 SOLUTION/ DROPS OPHTHALMIC; TOPICAL at 21:41

## 2021-10-29 RX ADMIN — CARVEDILOL PHOSPHATE 6.25 MILLIGRAM(S): 80 CAPSULE, EXTENDED RELEASE ORAL at 05:35

## 2021-10-29 RX ADMIN — Medication 3 MILLILITER(S): at 12:48

## 2021-10-29 NOTE — PROGRESS NOTE ADULT - PROBLEM SELECTOR PLAN 1
- consider ppi for   - no further ent intervention   - continue with pulm recs   - call ent prn - consider ppi for gerd  - baci to b/l nare qhs  - hold off on flonase while dry  - humidify o2  - no further ent intervention   - continue with pulm recs   - Patient should follow up in ENT office as an outpatient. May see Marina Mart or [facility]. Call [phone].

## 2021-10-29 NOTE — PROVIDER CONTACT NOTE (OTHER) - ASSESSMENT
Patient A&Ox4. SPO2 95% on 2LNC. Expiratory wheezing auscultated in all lung fields with accessory muscle use observed.

## 2021-10-29 NOTE — PROGRESS NOTE ADULT - PROBLEM SELECTOR PLAN 1
Pt with few b/l expiratory wheezes, mostly upper airways  -? 2nd to viral infection given recent viral illness in   -Now improved with solumedrol. Can continue with solumedrol 20mg IVP q8h for now. Will likely change to prednisone in AM.   -RVP negative  -C/o nasal congestion, start Flonase BID, c/w saline nasal spray   -CT chest grossly clear, monitor off abx   -C/w Duoneb q6h  -Check O2 sats on RA. Keep sats >90% with supplemental O2 PRN   -Suggest ENT consult. Pt with few b/l expiratory wheezes, mostly upper airways  -? 2nd to viral infection given recent viral illness in   -Hypercarbic on admission, now resolved   -Now improved with solumedrol. Can continue with solumedrol 20mg IVP q8h for now. Will likely change to prednisone in AM.   -RVP negative  -C/o nasal congestion, start Flonase BID, c/w saline nasal spray   -CT chest grossly clear, monitor off abx   -C/w Duoneb q6h  -Check O2 sats on RA. Keep sats >90% with supplemental O2 PRN   -Suggest ENT consult. Pt with few b/l expiratory wheezes, mostly upper airways  -? 2nd to viral infection given recent viral illness in   -Hypercarbic on admission, now resolved   -Now improved with solumedrol. Change to prednisone 30mg PO qd.   -RVP negative  -C/o nasal congestion, start Flonase BID, c/w saline nasal spray   -CT chest grossly clear, monitor off abx   -C/w Duoneb q6h  -Check O2 sats on RA. Keep sats >90% with supplemental O2 PRN   -Suggest ENT consult.

## 2021-10-29 NOTE — CHART NOTE - NSCHARTNOTEFT_GEN_A_CORE
MEDICINE PA     EVENT SUMMARY  Notified by RN for wheezing. Pt seen and examined at the bedside. PT is alert and oriented. Pt is c/o wheezing otherwise denies CP, dyspnea, CP, numbness, tingling, palpitations. Pt was given duoneb earlier post CT chest for wheezing. Pt admitted for wheezing and was given solumedrol and duoneb. CT chest prelim with no acute findings.       MEDICATIONS  (STANDING):  albuterol/ipratropium for Nebulization 3 milliLiter(s) Nebulizer every 6 hours  albuterol/ipratropium for Nebulization. 3 milliLiter(s) Nebulizer once  atorvastatin 40 milliGRAM(s) Oral at bedtime  carvedilol 6.25 milliGRAM(s) Oral every 12 hours  dorzolamide 2%/timolol 0.5% Ophthalmic Solution 1 Drop(s) Both EYES two times a day  latanoprost 0.005% Ophthalmic Solution 1 Drop(s) Right EYE at bedtime  methylPREDNISolone sodium succinate Injectable 20 milliGRAM(s) IV Push every 8 hours  multivitamin 1 Tablet(s) Oral daily  polyethylene glycol 3350 17 Gram(s) Oral two times a day  senna 2 Tablet(s) Oral at bedtime    MEDICATIONS  (PRN):  acetaminophen     Tablet .. 650 milliGRAM(s) Oral every 6 hours PRN Temp greater or equal to 38C (100.4F), Mild Pain (1 - 3)  sodium chloride 0.65% Nasal 1 Spray(s) Both Nostrils four times a day PRN Nasal Congestion    VSS   PHYSICAL EXAM   GENERAL: NAD, resting comfortably in bed   CV: S1, S2 RRR  RESPIRATORY: GENERALIZED EXPIRATORY WHEEZES heard throughout the lung fields.   ABDOMEN: soft NT ND     < from: Xray Chest 1 View- PORTABLE-Urgent (10.27.21 @ 00:58) >    FINDINGS/  IMPRESSION:    The lungs appear to be clear. No pneumothorax. Cardiac silhouette is unchanged. No acute osseous pathology.    --- End of Report ---    < end of copied text >    `< from: CT Chest No Cont (10.28.21 @ 20:16) >    ******PRELIMINARY REPORT******    ******PRELIMINARY REPORT******          EXAM:  CT CHEST                            PROCEDURE DATE:  10/28/2021      ******PRELIMINARY REPORT******    ******PRELIMINARY REPORT******              INTERPRETATION:  no emergent findings  follow up official report in AM            ******PRELIMINARY REPORT******    ******PRELIMINARY REPORT******          CHRISSY LLOYD MD; Resident Radiology    < end of copied text >    A&P  80 yo female PMHX HTN, HLD, seasonal allergies presents with one day of hypoxia admitted for pneumonia. Found to be hyponatremia    Wheezing  - Duonebs PRN   - start pt on solumedrol   D/w Dr. Mabry; recommended solumedrol 125 x 1 IVP and start 20 mg q 8 IVP solumedrol. Will continue to monitor. RN made aware of the plan of care.      Soraida BLOOD  #[id number]

## 2021-10-30 LAB
ANION GAP SERPL CALC-SCNC: 13 MMOL/L — SIGNIFICANT CHANGE UP (ref 5–17)
BUN SERPL-MCNC: 22 MG/DL — SIGNIFICANT CHANGE UP (ref 7–23)
CALCIUM SERPL-MCNC: 9.2 MG/DL — SIGNIFICANT CHANGE UP (ref 8.4–10.5)
CHLORIDE SERPL-SCNC: 98 MMOL/L — SIGNIFICANT CHANGE UP (ref 96–108)
CO2 SERPL-SCNC: 21 MMOL/L — LOW (ref 22–31)
CREAT SERPL-MCNC: 0.71 MG/DL — SIGNIFICANT CHANGE UP (ref 0.5–1.3)
GLUCOSE SERPL-MCNC: 103 MG/DL — HIGH (ref 70–99)
POTASSIUM SERPL-MCNC: 5.8 MMOL/L — HIGH (ref 3.5–5.3)
POTASSIUM SERPL-SCNC: 5.8 MMOL/L — HIGH (ref 3.5–5.3)
SODIUM SERPL-SCNC: 132 MMOL/L — LOW (ref 135–145)

## 2021-10-30 PROCEDURE — 71045 X-RAY EXAM CHEST 1 VIEW: CPT | Mod: 26

## 2021-10-30 RX ORDER — MONTELUKAST 4 MG/1
10 TABLET, CHEWABLE ORAL DAILY
Refills: 0 | Status: DISCONTINUED | OUTPATIENT
Start: 2021-10-30 | End: 2021-10-31

## 2021-10-30 RX ORDER — BUDESONIDE AND FORMOTEROL FUMARATE DIHYDRATE 160; 4.5 UG/1; UG/1
2 AEROSOL RESPIRATORY (INHALATION)
Refills: 0 | Status: DISCONTINUED | OUTPATIENT
Start: 2021-10-30 | End: 2021-10-31

## 2021-10-30 RX ORDER — LANOLIN ALCOHOL/MO/W.PET/CERES
3 CREAM (GRAM) TOPICAL AT BEDTIME
Refills: 0 | Status: DISCONTINUED | OUTPATIENT
Start: 2021-10-30 | End: 2021-10-31

## 2021-10-30 RX ADMIN — CARVEDILOL PHOSPHATE 6.25 MILLIGRAM(S): 80 CAPSULE, EXTENDED RELEASE ORAL at 17:06

## 2021-10-30 RX ADMIN — Medication 3 MILLILITER(S): at 17:06

## 2021-10-30 RX ADMIN — Medication 1 TABLET(S): at 11:31

## 2021-10-30 RX ADMIN — DORZOLAMIDE HYDROCHLORIDE TIMOLOL MALEATE 1 DROP(S): 20; 5 SOLUTION/ DROPS OPHTHALMIC at 05:35

## 2021-10-30 RX ADMIN — ATORVASTATIN CALCIUM 40 MILLIGRAM(S): 80 TABLET, FILM COATED ORAL at 21:06

## 2021-10-30 RX ADMIN — CARVEDILOL PHOSPHATE 6.25 MILLIGRAM(S): 80 CAPSULE, EXTENDED RELEASE ORAL at 05:34

## 2021-10-30 RX ADMIN — Medication 3 MILLILITER(S): at 05:33

## 2021-10-30 RX ADMIN — BUDESONIDE AND FORMOTEROL FUMARATE DIHYDRATE 2 PUFF(S): 160; 4.5 AEROSOL RESPIRATORY (INHALATION) at 17:09

## 2021-10-30 RX ADMIN — Medication 3 MILLILITER(S): at 23:45

## 2021-10-30 RX ADMIN — DORZOLAMIDE HYDROCHLORIDE TIMOLOL MALEATE 1 DROP(S): 20; 5 SOLUTION/ DROPS OPHTHALMIC at 17:05

## 2021-10-30 RX ADMIN — MONTELUKAST 10 MILLIGRAM(S): 4 TABLET, CHEWABLE ORAL at 17:09

## 2021-10-30 RX ADMIN — POLYETHYLENE GLYCOL 3350 17 GRAM(S): 17 POWDER, FOR SOLUTION ORAL at 17:06

## 2021-10-30 RX ADMIN — Medication 1 SPRAY(S): at 06:27

## 2021-10-30 RX ADMIN — LATANOPROST 1 DROP(S): 0.05 SOLUTION/ DROPS OPHTHALMIC; TOPICAL at 21:06

## 2021-10-30 RX ADMIN — Medication 30 MILLIGRAM(S): at 05:34

## 2021-10-30 RX ADMIN — Medication 3 MILLIGRAM(S): at 21:06

## 2021-10-30 RX ADMIN — PANTOPRAZOLE SODIUM 40 MILLIGRAM(S): 20 TABLET, DELAYED RELEASE ORAL at 05:34

## 2021-10-30 RX ADMIN — POLYETHYLENE GLYCOL 3350 17 GRAM(S): 17 POWDER, FOR SOLUTION ORAL at 05:33

## 2021-10-30 RX ADMIN — Medication 3 MILLILITER(S): at 11:31

## 2021-10-30 NOTE — PROGRESS NOTE ADULT - PROBLEM SELECTOR PLAN 1
Pt with few b/l expiratory wheezes, mostly upper airways  -? 2nd to viral infection given recent viral illness in   -Hypercarbic on admission, now resolved   -Wheezing improved with Solumedrol  -No wheezing on exam this AM, but pt still with intermittent c/o wheezing/SOB  -C/w Prednisone 30 mg PO qd for now.   -RVP negative  -C/o nasal congestion, c/w Flonase BID, c/w saline nasal spray   -CT chest grossly clear, monitor off abx   -C/w Duoneb q6h, can change to PRN on discharge  -Normoxic, O2 sats 96% on RA   -Keep sats >90% with supplemental O2 PRN   -ENT recs noted. Pt with few b/l expiratory wheezes, mostly upper airways  -? 2nd to viral infection given recent viral illness in   -? related to underlying lung disease  -Hypercarbic on admission, now resolved   -Wheezing improved with Solumedrol  -No wheezing on exam this AM, but pt still with intermittent c/o wheezing/SOB  -Will decrease Prednisone to 20 mg PO qd starting tomorrow AM. Continue with this dose until f/u in office in 1 week  -RVP negative  -C/o nasal congestion, c/w Flonase BID, c/w saline nasal spray   -Will add Symbicort 160/4.5 mcg 2 puffs BID & Singulair 10 mg PO daily   -CT chest grossly clear, monitor off abx   -C/w Duoneb q6h, can change to PRN on discharge  -Normoxic, O2 sats 96% on RA   -Keep sats >90% with supplemental O2 PRN   -ENT recs noted.  -D/c planning per primary team, Outpatient f/u in office with Dr. Mabry in 1 week Pt with few b/l expiratory wheezes, mostly upper airways  -? 2nd to viral infection given recent viral illness in   -Hypercarbic on admission, now resolved   -Wheezing improved with Solumedrol  -No wheezing on exam this AM, but pt still with intermittent c/o wheezing/SOB  -Will decrease Prednisone to 20 mg PO qd starting tomorrow AM. Continue with this dose until f/u in office in 1 week  -RVP negative  -C/o nasal congestion, c/w Flonase BID, c/w saline nasal spray   -Will add Symbicort 160/4.5 mcg 2 puffs BID & Singulair 10 mg PO daily   -CT chest grossly clear, monitor off abx   -C/w Duoneb q6h, can change to PRN on discharge  -Normoxic, O2 sats 96% on RA   -Keep sats >90% with supplemental O2 PRN   -ENT recs noted.  -D/c planning per primary team, Outpatient f/u in office with Dr. Mabry in 1 week

## 2021-10-30 NOTE — PROGRESS NOTE ADULT - PROBLEM SELECTOR PLAN 1
No signs of active PNA  CXR neg  No leukocytosis  will hold abx for now  with episode of choking will get speech and swallow eval

## 2021-10-30 NOTE — PROVIDER CONTACT NOTE (OTHER) - ACTION/TREATMENT ORDERED:
NP Samuel to bedside to evaluate patient. Patient threw up piece of burger she felt she was choking on.

## 2021-10-30 NOTE — PROGRESS NOTE ADULT - ATTENDING COMMENTS
laryngoscopy demonstrates nasal crusting and LPRD   nasal saline and bacitracin for crusting, humidificaiton to oxygen  PPI for reflux  no causes of stridor seen   c/w care per pulmonary
add singulair 10mg/d  dw pt and 
dw pt

## 2021-10-30 NOTE — CHART NOTE - NSCHARTNOTEFT_GEN_A_CORE
Informed by RN that patient choked on food while eating lunch.  Patient admit with R/o PNA; CT chest done 10/28 shows clear lungs.    P/E:  AA&O; fully conversant & appropriate  LCTA B/L   S1S2--> BP initially elevated after choking but has now resolved    VS:  Vital Signs Last 24 Hrs  T(C): 36.6 (30 Oct 2021 10:10), Max: 37.2 (29 Oct 2021 16:45)  T(F): 97.8 (30 Oct 2021 10:10), Max: 98.9 (29 Oct 2021 16:45)  HR: 103 (30 Oct 2021 13:39) (84 - 103)  BP: 173/104 (30 Oct 2021 13:39) (149/84 - 173/104)  BP(mean): --  RR: 16 (30 Oct 2021 13:39) (16 - 20)  SpO2: 95% (30 Oct 2021 13:39) (93% - 95%)    A/P:  PNA ruled out  B/L Exp wheezing-->Mostly resolved with inhalers/Nebs/ IV steroid--> now on po steroid  CXR ordered  S&S ordered    D/w Dr Ruiz  NP [place] [id number]

## 2021-10-30 NOTE — PROVIDER CONTACT NOTE (OTHER) - SITUATION
Pt said she started choking on hamburger when she was eating it. Pt breathing and sating at 20/ 95% on room air
Patient c/o SOB, she states that this is the exact feeling she felt that made her come to the hospital.

## 2021-10-31 ENCOUNTER — TRANSCRIPTION ENCOUNTER (OUTPATIENT)
Age: 81
End: 2021-10-31

## 2021-10-31 VITALS
DIASTOLIC BLOOD PRESSURE: 76 MMHG | TEMPERATURE: 99 F | HEART RATE: 78 BPM | SYSTOLIC BLOOD PRESSURE: 131 MMHG | OXYGEN SATURATION: 94 % | RESPIRATION RATE: 18 BRPM

## 2021-10-31 LAB
ANION GAP SERPL CALC-SCNC: 13 MMOL/L — SIGNIFICANT CHANGE UP (ref 5–17)
BUN SERPL-MCNC: 25 MG/DL — HIGH (ref 7–23)
CALCIUM SERPL-MCNC: 8.8 MG/DL — SIGNIFICANT CHANGE UP (ref 8.4–10.5)
CHLORIDE SERPL-SCNC: 98 MMOL/L — SIGNIFICANT CHANGE UP (ref 96–108)
CLOSURE TME COLL+EPINEP BLD: 186 K/UL — SIGNIFICANT CHANGE UP (ref 150–400)
CO2 SERPL-SCNC: 24 MMOL/L — SIGNIFICANT CHANGE UP (ref 22–31)
CREAT SERPL-MCNC: 0.91 MG/DL — SIGNIFICANT CHANGE UP (ref 0.5–1.3)
GLUCOSE SERPL-MCNC: 100 MG/DL — HIGH (ref 70–99)
HCT VFR BLD CALC: 39.9 % — SIGNIFICANT CHANGE UP (ref 34.5–45)
HGB BLD-MCNC: 13.4 G/DL — SIGNIFICANT CHANGE UP (ref 11.5–15.5)
MCHC RBC-ENTMCNC: 31.9 PG — SIGNIFICANT CHANGE UP (ref 27–34)
MCHC RBC-ENTMCNC: 33.6 GM/DL — SIGNIFICANT CHANGE UP (ref 32–36)
MCV RBC AUTO: 95 FL — SIGNIFICANT CHANGE UP (ref 80–100)
NRBC # BLD: 0 /100 WBCS — SIGNIFICANT CHANGE UP (ref 0–0)
PLATELET # BLD AUTO: 100 K/UL — LOW (ref 150–400)
POTASSIUM SERPL-MCNC: 4 MMOL/L — SIGNIFICANT CHANGE UP (ref 3.5–5.3)
POTASSIUM SERPL-SCNC: 4 MMOL/L — SIGNIFICANT CHANGE UP (ref 3.5–5.3)
RBC # BLD: 4.2 M/UL — SIGNIFICANT CHANGE UP (ref 3.8–5.2)
RBC # FLD: 12.5 % — SIGNIFICANT CHANGE UP (ref 10.3–14.5)
SODIUM SERPL-SCNC: 135 MMOL/L — SIGNIFICANT CHANGE UP (ref 135–145)
WBC # BLD: 8.75 K/UL — SIGNIFICANT CHANGE UP (ref 3.8–10.5)
WBC # FLD AUTO: 8.75 K/UL — SIGNIFICANT CHANGE UP (ref 3.8–10.5)

## 2021-10-31 PROCEDURE — 86769 SARS-COV-2 COVID-19 ANTIBODY: CPT

## 2021-10-31 PROCEDURE — 83880 ASSAY OF NATRIURETIC PEPTIDE: CPT

## 2021-10-31 PROCEDURE — 85014 HEMATOCRIT: CPT

## 2021-10-31 PROCEDURE — 85027 COMPLETE CBC AUTOMATED: CPT

## 2021-10-31 PROCEDURE — 85025 COMPLETE CBC W/AUTO DIFF WBC: CPT

## 2021-10-31 PROCEDURE — 83935 ASSAY OF URINE OSMOLALITY: CPT

## 2021-10-31 PROCEDURE — 96374 THER/PROPH/DIAG INJ IV PUSH: CPT

## 2021-10-31 PROCEDURE — 85018 HEMOGLOBIN: CPT

## 2021-10-31 PROCEDURE — 85730 THROMBOPLASTIN TIME PARTIAL: CPT

## 2021-10-31 PROCEDURE — 83605 ASSAY OF LACTIC ACID: CPT

## 2021-10-31 PROCEDURE — 87040 BLOOD CULTURE FOR BACTERIA: CPT

## 2021-10-31 PROCEDURE — 85610 PROTHROMBIN TIME: CPT

## 2021-10-31 PROCEDURE — 85379 FIBRIN DEGRADATION QUANT: CPT

## 2021-10-31 PROCEDURE — 96375 TX/PRO/DX INJ NEW DRUG ADDON: CPT

## 2021-10-31 PROCEDURE — 84484 ASSAY OF TROPONIN QUANT: CPT

## 2021-10-31 PROCEDURE — 82435 ASSAY OF BLOOD CHLORIDE: CPT

## 2021-10-31 PROCEDURE — 99285 EMERGENCY DEPT VISIT HI MDM: CPT

## 2021-10-31 PROCEDURE — 87449 NOS EACH ORGANISM AG IA: CPT

## 2021-10-31 PROCEDURE — 84295 ASSAY OF SERUM SODIUM: CPT

## 2021-10-31 PROCEDURE — 84300 ASSAY OF URINE SODIUM: CPT

## 2021-10-31 PROCEDURE — 80053 COMPREHEN METABOLIC PANEL: CPT

## 2021-10-31 PROCEDURE — 83735 ASSAY OF MAGNESIUM: CPT

## 2021-10-31 PROCEDURE — 80048 BASIC METABOLIC PNL TOTAL CA: CPT

## 2021-10-31 PROCEDURE — 84145 PROCALCITONIN (PCT): CPT

## 2021-10-31 PROCEDURE — 82803 BLOOD GASES ANY COMBINATION: CPT

## 2021-10-31 PROCEDURE — 97161 PT EVAL LOW COMPLEX 20 MIN: CPT

## 2021-10-31 PROCEDURE — 92610 EVALUATE SWALLOWING FUNCTION: CPT

## 2021-10-31 PROCEDURE — 0225U NFCT DS DNA&RNA 21 SARSCOV2: CPT

## 2021-10-31 PROCEDURE — 84132 ASSAY OF SERUM POTASSIUM: CPT

## 2021-10-31 PROCEDURE — 36415 COLL VENOUS BLD VENIPUNCTURE: CPT

## 2021-10-31 PROCEDURE — 82330 ASSAY OF CALCIUM: CPT

## 2021-10-31 PROCEDURE — 94640 AIRWAY INHALATION TREATMENT: CPT

## 2021-10-31 PROCEDURE — 71250 CT THORAX DX C-: CPT

## 2021-10-31 PROCEDURE — 85049 AUTOMATED PLATELET COUNT: CPT

## 2021-10-31 PROCEDURE — 71045 X-RAY EXAM CHEST 1 VIEW: CPT

## 2021-10-31 PROCEDURE — 82947 ASSAY GLUCOSE BLOOD QUANT: CPT

## 2021-10-31 RX ORDER — ALBUTEROL 90 UG/1
2 AEROSOL, METERED ORAL
Qty: 1 | Refills: 0
Start: 2021-10-31

## 2021-10-31 RX ORDER — PANTOPRAZOLE SODIUM 20 MG/1
1 TABLET, DELAYED RELEASE ORAL
Qty: 0 | Refills: 0 | DISCHARGE
Start: 2021-10-31

## 2021-10-31 RX ORDER — MONTELUKAST 4 MG/1
1 TABLET, CHEWABLE ORAL
Qty: 30 | Refills: 0
Start: 2021-10-31 | End: 2021-11-29

## 2021-10-31 RX ORDER — SENNA PLUS 8.6 MG/1
2 TABLET ORAL
Qty: 0 | Refills: 0 | DISCHARGE
Start: 2021-10-31

## 2021-10-31 RX ORDER — MONTELUKAST 4 MG/1
1 TABLET, CHEWABLE ORAL
Qty: 0 | Refills: 0 | DISCHARGE
Start: 2021-10-31

## 2021-10-31 RX ORDER — PANTOPRAZOLE SODIUM 20 MG/1
1 TABLET, DELAYED RELEASE ORAL
Qty: 30 | Refills: 0
Start: 2021-10-31 | End: 2021-11-29

## 2021-10-31 RX ORDER — BUDESONIDE AND FORMOTEROL FUMARATE DIHYDRATE 160; 4.5 UG/1; UG/1
2 AEROSOL RESPIRATORY (INHALATION)
Qty: 0 | Refills: 0 | DISCHARGE
Start: 2021-10-31

## 2021-10-31 RX ORDER — BUDESONIDE AND FORMOTEROL FUMARATE DIHYDRATE 160; 4.5 UG/1; UG/1
2 AEROSOL RESPIRATORY (INHALATION)
Qty: 1 | Refills: 0
Start: 2021-10-31

## 2021-10-31 RX ADMIN — PANTOPRAZOLE SODIUM 40 MILLIGRAM(S): 20 TABLET, DELAYED RELEASE ORAL at 05:52

## 2021-10-31 RX ADMIN — BUDESONIDE AND FORMOTEROL FUMARATE DIHYDRATE 2 PUFF(S): 160; 4.5 AEROSOL RESPIRATORY (INHALATION) at 05:52

## 2021-10-31 RX ADMIN — Medication 3 MILLILITER(S): at 05:50

## 2021-10-31 RX ADMIN — DORZOLAMIDE HYDROCHLORIDE TIMOLOL MALEATE 1 DROP(S): 20; 5 SOLUTION/ DROPS OPHTHALMIC at 05:51

## 2021-10-31 RX ADMIN — MONTELUKAST 10 MILLIGRAM(S): 4 TABLET, CHEWABLE ORAL at 11:31

## 2021-10-31 RX ADMIN — Medication 1 TABLET(S): at 11:31

## 2021-10-31 RX ADMIN — Medication 20 MILLIGRAM(S): at 05:51

## 2021-10-31 RX ADMIN — Medication 3 MILLILITER(S): at 11:32

## 2021-10-31 RX ADMIN — CARVEDILOL PHOSPHATE 6.25 MILLIGRAM(S): 80 CAPSULE, EXTENDED RELEASE ORAL at 05:51

## 2021-10-31 NOTE — SWALLOW BEDSIDE ASSESSMENT ADULT - SLP PERTINENT HISTORY OF CURRENT PROBLEM
Valeria Durham is an 81 year old female PMHX HTN, Glaucoma, HLD presented with one day history of SOB and wheezing.  Patient states she was in usual state of health until 10/26, when she started to feel wheezing after breakfast. She made her  breakfast and then played bridge from 1-3, but after bridge game, she could not catch her breath. Patient tried to take Robitussin as well as make hot water with lemon and inhale the steam, but she was continuing to worsen. Unable to eat her lunch or dinner due to lack of appetite.  Came to ED for further evaluation. +sick contact in , had viral illness 2 weeks prior, with residual post-viral tussis at this time. Patient admit with R/o PNA

## 2021-10-31 NOTE — PROGRESS NOTE ADULT - PROBLEM SELECTOR PLAN 5
patient did not recall taking synthroid, will confirm with pharmacy and if correct will re-order

## 2021-10-31 NOTE — DISCHARGE NOTE NURSING/CASE MANAGEMENT/SOCIAL WORK - PATIENT PORTAL LINK FT
You can access the FollowMyHealth Patient Portal offered by [facility] by registering at the following website: [email or web address]. By joining [facility]’s FollowMyHealth portal, you will also be able to view your health information using other applications (apps) compatible with our system.

## 2021-10-31 NOTE — SWALLOW BEDSIDE ASSESSMENT ADULT - NS SPL SWALLOW CLINIC TRIAL FT
Noted pt to cut up the solids.   Pt reporting that she ate lamb chops last night and did well, she just needs to 'chop it up' and 'I did fine'.   Pt denied globus sensation and denied pain with deglutition.   She denied other 'choking' episodes since being more aware during meal time and cutting up harder to chew solids, as needed.

## 2021-10-31 NOTE — PROGRESS NOTE ADULT - TIME BILLING
Discussed treatment plan with patient  at bedside.
Discussed treatment plan with patient and  at bedside.
Discussed treatment plan with patient at bedside.
Discussed treatment plan with patient  at bedside.  DC planning home today

## 2021-10-31 NOTE — PROGRESS NOTE ADULT - PROBLEM SELECTOR PLAN 3
discussed with nephrology   Possible spurious urine lytes due to IV fluid administration prior to test  considering repeating lytes  Limit fluid intake
Sodium improving  Patient with normal serum osmolality but elevated urine sodium  No hx of diuretic use  will ask nephrology to see Patient
discussed with nephrology   considering repeating lytes  Limit fluid intake
discussed with nephrology   considering repeating lytes  Limit fluid intake  Sodium has improved

## 2021-10-31 NOTE — SWALLOW BEDSIDE ASSESSMENT ADULT - COMMENTS
In ED, found to be hypercarbic with pH 7.25, placed on BIPAP, given solumedrol 125mg and DuoNeb’s along with Levaquin.  +mild leukocytosis, hypertensive & tachypneic. Pulmonary called to consult for SOB/wheezing.  > CT chest done 10/28 shows clear lungs.  >10/29 -- ENT Patient was unable to cooperate with mirror. “Nasopharynx, oropharynx, and hypopharynx clear, no bleeding. Tongue base, posterior pharyngeal wall, vallecula, epiglottis, and subglottis appear normal. No erythema, edema, pooling of secretions, masses or lesions. Airway patent, no foreign body visualized. No glottic/supraglottic edema. True vocal cords, arytenoids, vestibular folds, ventricles, pyriform sinuses, and aryepiglottic folds appear normal bilaterally. Vocal cords mobile with good contact b/l. posterior arytenoid edema noted on indirect laryngoscopy, likely 2/2 GERD”.   >10/30 informed by RN that patient choked on food while eating lunch.    Speech.Swallow history: Pt is new to this service. She denied any history of dysphagia. Pt reporting that she "choked" on a burger and a cough drop; describing it as "coughing" and taking "too large of a bite" from the burger and "not paying attention" with the cough drop.   No reports in SCM or studies in PACS. In ED, found to be hypercarbic with pH 7.25, placed on BIPAP, given solumedrol 125mg and DuoNeb’s along with Levaquin.  +mild leukocytosis, hypertensive & tachypneic. Pulmonary called to consult for SOB/wheezing.  > CT chest done 10/28 shows clear lungs.  >10/29 -- ENT Patient was unable to cooperate with mirror. “Nasopharynx, oropharynx, and hypopharynx clear, no bleeding. Tongue base, posterior pharyngeal wall, vallecula, epiglottis, and subglottis appear normal. No erythema, edema, pooling of secretions, masses or lesions. Airway patent, no foreign body visualized. No glottic/supraglottic edema. True vocal cords, arytenoids, vestibular folds, ventricles, pyriform sinuses, and aryepiglottic folds appear normal bilaterally. Vocal cords mobile with good contact b/l. posterior arytenoid edema noted on indirect laryngoscopy, likely 2/2 GERD”.   >10/30 informed by RN that patient choked on food while eating lunch.  >10/30 lungs are clear   >WBC WNL 10/31    Speech.Swallow history: Pt is new to this service. She denied any history of dysphagia. Pt reporting that she "choked" on a burger and a cough drop; describing it as "coughing" and taking "too large of a bite" from the burger and "not paying attention" with the cough drop.   No reports in SCM or studies in PACS.

## 2021-10-31 NOTE — PROGRESS NOTE ADULT - SUBJECTIVE AND OBJECTIVE BOX
Patient seen and examined. Patient is known to our group. will assume care in the am
ENT ISSUE/POD: upper airway eval     HPI: 81 year old female PMHX HTN, Glaucoma, HLD presented with one day history of SOB and wheezing.  Patient states she was in usual state of health until 10/26, when she started to feel wheezing after breakfast. She made her  breakfast and hten played bridge from 1-3, but after bridge game, she could not cath her breath. Patient tried to take robitussin as well as make hot water with lemon and inhale the steam, but she was continuing to worsen. Unable to eat her lunch or dinner due to lack of appetite.  Came to ED for further evaluation. Per patient no cough, no fever, chills. +sick contact in , had viral illness 2 weeks prior, with residual post-viral tussis at this time. No recent travel. No diarrhea, abodminal pain.   In ED, found to be hypercarbic with pH 7.25, placed on BIPAP, given solumedrol 125mg and duonebs along with levaquin.  Patient with continue episode of shortness of breath and wheezing last night. This am wheezing has improved. ENT called for upper airway eval per pulm. Pt denies any dysphagia, odynophagia, dysphonia, changes in voice or inability to tolerated secretions       PAST MEDICAL & SURGICAL HISTORY:  Osteoarthritis of hip  right    HTN (hypertension)    Glaucoma    HLD (hyperlipidemia)    Hypothyroid    LBP (low back pain)    S/P hip replacement, right    S/P right cataract extraction      Allergies    penicillins (Rash)  strawberry (Unknown)    Intolerances      MEDICATIONS  (STANDING):  albuterol/ipratropium for Nebulization 3 milliLiter(s) Nebulizer every 6 hours  atorvastatin 40 milliGRAM(s) Oral at bedtime  carvedilol 6.25 milliGRAM(s) Oral every 12 hours  dorzolamide 2%/timolol 0.5% Ophthalmic Solution 1 Drop(s) Both EYES two times a day  fluticasone propionate 50 MICROgram(s)/spray Nasal Spray 1 Spray(s) Both Nostrils two times a day  latanoprost 0.005% Ophthalmic Solution 1 Drop(s) Right EYE at bedtime  methylPREDNISolone sodium succinate Injectable 20 milliGRAM(s) IV Push every 8 hours  multivitamin 1 Tablet(s) Oral daily  polyethylene glycol 3350 17 Gram(s) Oral two times a day  senna 2 Tablet(s) Oral at bedtime    MEDICATIONS  (PRN):  acetaminophen     Tablet .. 650 milliGRAM(s) Oral every 6 hours PRN Temp greater or equal to 38C (100.4F), Mild Pain (1 - 3)  sodium chloride 0.65% Nasal 1 Spray(s) Both Nostrils four times a day PRN Nasal Congestion      social history: see consult     family history: see consult   ROS:   ENT: all negative except as noted in HPI   Pulm: denies SOB, cough, hemoptysis  Neuro: denies numbness/tingling, loss of sensation  Endo: denies heat/cold intolerance, excessive sweating      Vital Signs Last 24 Hrs  T(C): 36.4 (29 Oct 2021 04:28), Max: 36.9 (28 Oct 2021 16:10)  T(F): 97.5 (29 Oct 2021 04:28), Max: 98.4 (28 Oct 2021 16:10)  HR: 103 (29 Oct 2021 04:28) (84 - 103)  BP: 148/81 (29 Oct 2021 04:28) (143/85 - 158/82)  BP(mean): --  RR: 20 (29 Oct 2021 04:28) (20 - 20)  SpO2: 93% (29 Oct 2021 04:28) (93% - 98%)                          13.8   8.22  )-----------( 75       ( 29 Oct 2021 07:11 )             40.7    10-29    133<L>  |  96  |  18  ----------------------------<  161<H>  4.4   |  22  |  0.76    Ca    9.1      29 Oct 2021 07:12         PHYSICAL EXAM:  Gen: NAD  Skin: No rashes, bruises, or lesions  Head: Normocephalic, Atraumatic  Face: no edema, erythema, or fluctuance. Parotid glands soft without mass  Eyes: no scleral injection  Nose: Nares bilaterally patent, no discharge, + crusting b/l, nasal cannula in place  Mouth: No Stridor / Drooling / Trismus.  Mucosa moist, tongue/uvula midline, oropharynx clear  Neck: Flat, supple, no lymphadenopathy, trachea midline, no masses  Lymphatic: No lymphadenopathy  Resp: breathing easily, no stridor  Neuro: facial nerve intact, no facial droop        Reason for Laryngoscopy:    Patient was unable to cooperate with mirror.  Nasopharynx, oropharynx, and hypopharynx clear, no bleeding. Tongue base, posterior pharyngeal wall, vallecula, epiglottis, and subglottis appear normal. No erythema, edema, pooling of secretions, masses or lesions. Airway patent, no foreign body visualized. No glottic/supraglottic edema. True vocal cords, arytenoids, vestibular folds, ventricles, pyriform sinuses, and aryepiglottic folds appear normal bilaterally. Vocal cords mobile with good contact b/l. posterior arytenoid edema noted on indirect laryngoscopy, likely 2/2 GERD       
Follow-up Pulm Progress Note    OOB to chair  O2 sats 96% on RA  Breathing comfortably, s/p nebulizer treatment  Still with intermittent episodes of SOB, states that she cannot breathe without nebulizers  Denies CP    Medications:  MEDICATIONS  (STANDING):  albuterol/ipratropium for Nebulization 3 milliLiter(s) Nebulizer every 6 hours  atorvastatin 40 milliGRAM(s) Oral at bedtime  carvedilol 6.25 milliGRAM(s) Oral every 12 hours  dorzolamide 2%/timolol 0.5% Ophthalmic Solution 1 Drop(s) Both EYES two times a day  fluticasone propionate 50 MICROgram(s)/spray Nasal Spray 1 Spray(s) Both Nostrils two times a day  latanoprost 0.005% Ophthalmic Solution 1 Drop(s) Right EYE at bedtime  multivitamin 1 Tablet(s) Oral daily  pantoprazole    Tablet 40 milliGRAM(s) Oral before breakfast  polyethylene glycol 3350 17 Gram(s) Oral two times a day  predniSONE   Tablet 30 milliGRAM(s) Oral daily  senna 2 Tablet(s) Oral at bedtime    MEDICATIONS  (PRN):  acetaminophen     Tablet .. 650 milliGRAM(s) Oral every 6 hours PRN Temp greater or equal to 38C (100.4F), Mild Pain (1 - 3)  sodium chloride 0.65% Nasal 1 Spray(s) Both Nostrils four times a day PRN Nasal Congestion    Vital Signs Last 24 Hrs  T(C): 36.6 (30 Oct 2021 10:10), Max: 37.2 (29 Oct 2021 16:45)  T(F): 97.8 (30 Oct 2021 10:10), Max: 98.9 (29 Oct 2021 16:45)  HR: 86 (30 Oct 2021 10:10) (84 - 89)  BP: 153/85 (30 Oct 2021 10:10) (144/78 - 153/85)  BP(mean): --  RR: 17 (30 Oct 2021 10:10) (17 - 20)  SpO2: 93% (30 Oct 2021 10:10) (93% - 95%)    10-29 @ 07:01  -  10-30 @ 07:00  --------------------------------------------------------  IN: 1260 mL / OUT: 1200 mL / NET: 60 mL    LABS:                        13.8   8.22  )-----------( 75       ( 29 Oct 2021 07:11 )             40.7     10-30    132<L>  |  98  |  22  ----------------------------<  103<H>  5.8<H>   |  21<L>  |  0.71    Ca    9.2      30 Oct 2021 07:11    Procalcitonin, Serum: 0.06 ng/mL (10-29-21 @ 07:12)      CULTURES:     Culture - Blood (collected 10-27-21 @ 03:24)  Source: .Blood Blood-Venous  Preliminary Report (10-28-21 @ 04:01):    No growth to date.    Culture - Blood (collected 10-27-21 @ 03:24)  Source: .Blood Blood-Peripheral  Preliminary Report (10-28-21 @ 04:01):    No growth to date.    Physical Examination:  PULM: Clear to auscultation bilaterally, no significant sputum production  CVS: RRR    RADIOLOGY REVIEWED    CT chest: < from: CT Chest No Cont (10.28.21 @ 20:16) >  FINDINGS:    LUNGS AND AIRWAYS: Patent central airways.  Clear lungs.  PLEURA: No pleural effusion.  MEDIASTINUM AND LUIS: No lymphadenopathy.  VESSELS: Calcified atherosclerotic plaques in the coronary arteries. Normal caliber aorta and main pulmonary artery.  HEART: Heart size is normal. No pericardial effusion.  CHEST WALL AND LOWER NECK: Within normal limits.  VISUALIZED UPPER ABDOMEN: Moderate size hiatal hernia.  BONES: Degenerative changes of the spine.    IMPRESSION:  Clear lungs.    < end of copied text >      
Follow-up Pulm Progress Note    Pt started on solumedrol last night for continued wheezing  Reports some dyspnea, wheezing better  Denies CP  Sats 99% 2LNC     Medications:  MEDICATIONS  (STANDING):  albuterol/ipratropium for Nebulization 3 milliLiter(s) Nebulizer every 6 hours  atorvastatin 40 milliGRAM(s) Oral at bedtime  carvedilol 6.25 milliGRAM(s) Oral every 12 hours  dorzolamide 2%/timolol 0.5% Ophthalmic Solution 1 Drop(s) Both EYES two times a day  latanoprost 0.005% Ophthalmic Solution 1 Drop(s) Right EYE at bedtime  methylPREDNISolone sodium succinate Injectable 20 milliGRAM(s) IV Push every 8 hours  multivitamin 1 Tablet(s) Oral daily  polyethylene glycol 3350 17 Gram(s) Oral two times a day  senna 2 Tablet(s) Oral at bedtime    MEDICATIONS  (PRN):  acetaminophen     Tablet .. 650 milliGRAM(s) Oral every 6 hours PRN Temp greater or equal to 38C (100.4F), Mild Pain (1 - 3)  sodium chloride 0.65% Nasal 1 Spray(s) Both Nostrils four times a day PRN Nasal Congestion          Vital Signs Last 24 Hrs  T(C): 36.4 (29 Oct 2021 04:28), Max: 36.9 (28 Oct 2021 16:10)  T(F): 97.5 (29 Oct 2021 04:28), Max: 98.4 (28 Oct 2021 16:10)  HR: 103 (29 Oct 2021 04:28) (84 - 103)  BP: 148/81 (29 Oct 2021 04:28) (143/85 - 162/96)  BP(mean): --  RR: 20 (29 Oct 2021 04:28) (20 - 20)  SpO2: 93% (29 Oct 2021 04:28) (93% - 100%)      VBG pH 7.40 10-28 @ 07:11    VBG pCO2 44 10-28 @ 07:11    VBG O2 sat 96.9 10-28 @ 07:11    VBG lactate -- 10-28 @ 07:11      10-28 @ 07:01  -  10-29 @ 07:00  --------------------------------------------------------  IN: 1220 mL / OUT: 1450 mL / NET: -230 mL          LABS:                        13.8   8.22  )-----------( 75       ( 29 Oct 2021 07:11 )             40.7     10-29    133<L>  |  96  |  18  ----------------------------<  161<H>  4.4   |  22  |  0.76    Ca    9.1      29 Oct 2021 07:12            CAPILLARY BLOOD GLUCOSE            Procalcitonin, Serum: 0.06 ng/mL (10-29-21 @ 07:12)    Serum Pro-Brain Natriuretic Peptide: 91 pg/mL (10-27-21 @ 01:05)                CULTURES:     Culture - Blood (collected 10-27-21 @ 03:24)  Source: .Blood Blood-Venous  Preliminary Report (10-28-21 @ 04:01):    No growth to date.    Culture - Blood (collected 10-27-21 @ 03:24)  Source: .Blood Blood-Peripheral  Preliminary Report (10-28-21 @ 04:01):    No growth to date.          Physical Examination:  PULM: Clear to auscultation bilaterally, no significant sputum production  CVS: S1, S2 heard    RADIOLOGY REVIEWED    CT chest: < from: CT Chest No Cont (10.28.21 @ 20:16) >  FINDINGS:    LUNGS AND AIRWAYS: Patent central airways.  Clear lungs.  PLEURA: No pleural effusion.  MEDIASTINUM AND LUIS: No lymphadenopathy.  VESSELS: Calcified atherosclerotic plaques in the coronary arteries. Normal caliber aorta and main pulmonary artery.  HEART: Heart size is normal. No pericardial effusion.  CHEST WALL AND LOWER NECK: Within normal limits.  VISUALIZED UPPER ABDOMEN: Moderate size hiatal hernia.  BONES: Degenerative changes of the spine.    IMPRESSION:  Clear lungs.    < end of copied text >    
81 year old female PMHX HTN, Glaucoma, HLD presented with one day history of SOB and wheezing.  Patient states she was in usual state of health until 10/26, when she started to feel wheezing after breakfast. She made her  breakfast and hten played bridge from 1-3, but after bridge game, she could not cath her breath. Patient tried to take robitussin as well as make hot water with lemon and inhale the steam, but she was continuing to worsen. Unable to eat her lunch or dinner due to lack of appetite.  Came to ED for further evaluation. Per patient no cough, no fever, chills. +sick contact in , had viral illness 2 weeks prior, with residual post-viral tussis at this time. No recent travel. No diarrhea, abodminal pain. DSOB and wheezing has improved. Patient with an episode of choking on food today. Seen now resting comfortably    MEDICATIONS  (STANDING):  albuterol/ipratropium for Nebulization 3 milliLiter(s) Nebulizer every 6 hours  atorvastatin 40 milliGRAM(s) Oral at bedtime  budesonide 160 MICROgram(s)/formoterol 4.5 MICROgram(s) Inhaler 2 Puff(s) Inhalation two times a day  carvedilol 6.25 milliGRAM(s) Oral every 12 hours  dorzolamide 2%/timolol 0.5% Ophthalmic Solution 1 Drop(s) Both EYES two times a day  latanoprost 0.005% Ophthalmic Solution 1 Drop(s) Right EYE at bedtime  montelukast 10 milliGRAM(s) Oral daily  multivitamin 1 Tablet(s) Oral daily  pantoprazole    Tablet 40 milliGRAM(s) Oral before breakfast  polyethylene glycol 3350 17 Gram(s) Oral two times a day  senna 2 Tablet(s) Oral at bedtime    MEDICATIONS  (PRN):  acetaminophen     Tablet .. 650 milliGRAM(s) Oral every 6 hours PRN Temp greater or equal to 38C (100.4F), Mild Pain (1 - 3)  sodium chloride 0.65% Nasal 1 Spray(s) Both Nostrils four times a day PRN Nasal Congestion          VITALS:   T(C): 36.6 (10-30-21 @ 10:10), Max: 37.2 (10-29-21 @ 16:45)  HR: 103 (10-30-21 @ 13:39) (84 - 103)  BP: 173/104 (10-30-21 @ 13:39) (149/84 - 173/104)  RR: 16 (10-30-21 @ 13:39) (16 - 20)  SpO2: 95% (10-30-21 @ 13:39) (93% - 95%)  Wt(kg): --    PHYSICAL EXAM:  GENERAL: NAD, well nourished and conversant  HEAD:  Atraumatic  EYES: EOM, PERRLA, conjunctiva pink and sclera white  ENT: No tonsillar erythema, exudates, or enlargement, moist mucous membranes, good dentition, no lesions  NECK: Supple, No JVD, normal thyroid, carotids with normal upstrokes and no bruits  CHEST/LUNG: Clear to auscultation bilaterally, No rales, rhonchi, wheezing, or rubs  HEART: Regular rate and rhythm, No murmurs, rubs, or gallops  ABDOMEN: Soft, nondistended, no masses, guarding, tenderness or rebound, bowel sounds present  EXTREMITIES:  2+ Peripheral Pulses, No clubbing, cyanosis, or edema.   LYMPH: No lymphadenopathy noted  SKIN: No rashes or lesions  NERVOUS SYSTEM:  Alert & Oriented X3, normal cognitive function. Motor Strength 5/5 right upper and right lower.  5/5 left upper and left lower extremities, DTRs 2+ intact and symmetric    LABS:        CBC Full  -  ( 29 Oct 2021 07:11 )  WBC Count : 8.22 K/uL  RBC Count : 4.34 M/uL  Hemoglobin : 13.8 g/dL  Hematocrit : 40.7 %  Platelet Count - Automated : 75 K/uL  Mean Cell Volume : 93.8 fl  Mean Cell Hemoglobin : 31.8 pg  Mean Cell Hemoglobin Concentration : 33.9 gm/dL  Auto Neutrophil # : x  Auto Lymphocyte # : x  Auto Monocyte # : x  Auto Eosinophil # : x  Auto Basophil # : x  Auto Neutrophil % : x  Auto Lymphocyte % : x  Auto Monocyte % : x  Auto Eosinophil % : x  Auto Basophil % : x    10-30    132<L>  |  98  |  22  ----------------------------<  103<H>  5.8<H>   |  21<L>  |  0.71    Ca    9.2      30 Oct 2021 07:11            CAPILLARY BLOOD GLUCOSE          RADIOLOGY & ADDITIONAL TESTS:      
81 year old female PMHX HTN, Glaucoma, HLD presented with one day history of SOB and wheezing.  Patient states she was in usual state of health until 10/26, when she started to feel wheezing after breakfast. She made her  breakfast and hten played bridge from 1-3, but after bridge game, she could not cath her breath. Patient tried to take robitussin as well as make hot water with lemon and inhale the steam, but she was continuing to worsen. Unable to eat her lunch or dinner due to lack of appetite.  Came to ED for further evaluation. Per patient no cough, no fever, chills. +sick contact in , had viral illness 2 weeks prior, with residual post-viral tussis at this time. No recent travel. No diarrhea, abodminal pain.   In ED, found to be hypercarbic with pH 7.25, placed on BIPAP, given solumedrol 125mg and duonebs along with levaquin.  Patient feeling better today but continue episodes of wheezing requiring nebs. Pts hyponatremia has improved     MEDICATIONS  (STANDING):  albuterol/ipratropium for Nebulization 3 milliLiter(s) Nebulizer every 6 hours  atorvastatin 40 milliGRAM(s) Oral at bedtime  carvedilol 6.25 milliGRAM(s) Oral every 12 hours  dorzolamide 2%/timolol 0.5% Ophthalmic Solution 1 Drop(s) Both EYES two times a day  latanoprost 0.005% Ophthalmic Solution 1 Drop(s) Right EYE at bedtime  levoFLOXacin IVPB 500 milliGRAM(s) IV Intermittent every 24 hours  multivitamin 1 Tablet(s) Oral daily  senna 2 Tablet(s) Oral at bedtime    MEDICATIONS  (PRN):  acetaminophen     Tablet .. 650 milliGRAM(s) Oral every 6 hours PRN Temp greater or equal to 38C (100.4F), Mild Pain (1 - 3)  polyethylene glycol 3350 17 Gram(s) Oral daily PRN Constipation  sodium chloride 0.65% Nasal 1 Spray(s) Both Nostrils four times a day PRN Nasal Congestion          VITALS:   T(C): 36.5 (10-28-21 @ 08:34), Max: 37.4 (10-27-21 @ 15:57)  HR: 85 (10-28-21 @ 08:34) (84 - 103)  BP: 149/86 (10-28-21 @ 08:34) (119/68 - 158/-)  RR: 20 (10-28-21 @ 08:34) (18 - 22)  SpO2: 96% (10-28-21 @ 08:34) (94% - 98%)  Wt(kg): --    PHYSICAL EXAM:  GENERAL: NAD, well nourished and conversant  HEAD:  Atraumatic  EYES: EOM, PERRLA, conjunctiva pink and sclera white  ENT: No tonsillar erythema, exudates, or enlargement, moist mucous membranes, good dentition, no lesions  NECK: Supple, No JVD, normal thyroid, carotids with normal upstrokes and no bruits  CHEST/LUNG: Clear to auscultation bilaterally, No rales, rhonchi, wheezing, or rubs  HEART: Regular rate and rhythm, No murmurs, rubs, or gallops  ABDOMEN: Soft, nondistended, no masses, guarding, tenderness or rebound, bowel sounds present  EXTREMITIES:  2+ Peripheral Pulses, No clubbing, cyanosis, or edema.   LYMPH: No lymphadenopathy noted  SKIN: No rashes or lesions  NERVOUS SYSTEM:  Alert & Oriented X3, normal cognitive function. Motor Strength 5/5 right upper and right lower.  5/5 left upper and left lower extremities, DTRs 2+ intact and symmetric      LABS:        CBC Full  -  ( 28 Oct 2021 06:59 )  WBC Count : 9.76 K/uL  RBC Count : 4.33 M/uL  Hemoglobin : 13.9 g/dL  Hematocrit : 39.6 %  Platelet Count - Automated : See note  Mean Cell Volume : 91.5 fl  Mean Cell Hemoglobin : 32.1 pg  Mean Cell Hemoglobin Concentration : 35.1 gm/dL  Auto Neutrophil # : x  Auto Lymphocyte # : x  Auto Monocyte # : x  Auto Eosinophil # : x  Auto Basophil # : x  Auto Neutrophil % : x  Auto Lymphocyte % : x  Auto Monocyte % : x  Auto Eosinophil % : x  Auto Basophil % : x    10-28    134<L>  |  99  |  19  ----------------------------<  131<H>  4.0   |  23  |  0.79    Ca    8.9      28 Oct 2021 06:59  Mg     1.8     10-27    TPro  7.0  /  Alb  4.7  /  TBili  0.9  /  DBili  x   /  AST  23  /  ALT  19  /  AlkPhos  64  10-27    LIVER FUNCTIONS - ( 27 Oct 2021 01:05 )  Alb: 4.7 g/dL / Pro: 7.0 g/dL / ALK PHOS: 64 U/L / ALT: 19 U/L / AST: 23 U/L / GGT: x           PT/INR - ( 27 Oct 2021 01:05 )   PT: 11.7 sec;   INR: 0.97 ratio         PTT - ( 27 Oct 2021 01:05 )  PTT:31.8 sec    CAPILLARY BLOOD GLUCOSE          RADIOLOGY & ADDITIONAL TESTS:      
81 year old female PMHX HTN, Glaucoma, HLD presented with one day history of SOB and wheezing.  Patient states she was in usual state of health until 10/26, when she started to feel wheezing after breakfast. She made her  breakfast and hten played bridge from 1-3, but after bridge game, she could not cath her breath. Patient tried to take robitussin as well as make hot water with lemon and inhale the steam, but she was continuing to worsen. Unable to eat her lunch or dinner due to lack of appetite.  Came to ED for further evaluation. Per patient no cough, no fever, chills. +sick contact in , had viral illness 2 weeks prior, with residual post-viral tussis at this time. No recent travel. No diarrhea, abodminal pain.   In ED, found to be hypercarbic with pH 7.25, placed on BIPAP, given solumedrol 125mg and duonebs along with levaquin.  Patient with continue episode of shortness of breath and wheezing last night. This am wheezing has improved.     MEDICATIONS  (STANDING):  albuterol/ipratropium for Nebulization 3 milliLiter(s) Nebulizer every 6 hours  atorvastatin 40 milliGRAM(s) Oral at bedtime  carvedilol 6.25 milliGRAM(s) Oral every 12 hours  dorzolamide 2%/timolol 0.5% Ophthalmic Solution 1 Drop(s) Both EYES two times a day  fluticasone propionate 50 MICROgram(s)/spray Nasal Spray 1 Spray(s) Both Nostrils two times a day  latanoprost 0.005% Ophthalmic Solution 1 Drop(s) Right EYE at bedtime  methylPREDNISolone sodium succinate Injectable 20 milliGRAM(s) IV Push every 8 hours  multivitamin 1 Tablet(s) Oral daily  polyethylene glycol 3350 17 Gram(s) Oral two times a day  senna 2 Tablet(s) Oral at bedtime    MEDICATIONS  (PRN):  acetaminophen     Tablet .. 650 milliGRAM(s) Oral every 6 hours PRN Temp greater or equal to 38C (100.4F), Mild Pain (1 - 3)  sodium chloride 0.65% Nasal 1 Spray(s) Both Nostrils four times a day PRN Nasal Congestion          VITALS:   T(C): 36.4 (10-29-21 @ 04:28), Max: 36.9 (10-28-21 @ 16:10)  HR: 103 (10-29-21 @ 04:28) (84 - 103)  BP: 148/81 (10-29-21 @ 04:28) (143/85 - 158/82)  RR: 20 (10-29-21 @ 04:28) (20 - 20)  SpO2: 93% (10-29-21 @ 04:28) (93% - 98%)  Wt(kg): --    PHYSICAL EXAM:  GENERAL: NAD, well nourished and conversant  HEAD:  Atraumatic  EYES: EOM, PERRLA, conjunctiva pink and sclera white  ENT: No tonsillar erythema, exudates, or enlargement, moist mucous membranes, good dentition, no lesions  NECK: Supple, No JVD, normal thyroid, carotids with normal upstrokes and no bruits  CHEST/LUNG: Clear to auscultation bilaterally, No rales, rhonchi, wheezing, or rubs  HEART: Regular rate and rhythm, No murmurs, rubs, or gallops  ABDOMEN: Soft, nondistended, no masses, guarding, tenderness or rebound, bowel sounds present  EXTREMITIES:  2+ Peripheral Pulses, No clubbing, cyanosis, or edema.   LYMPH: No lymphadenopathy noted  SKIN: No rashes or lesions  NERVOUS SYSTEM:  Alert & Oriented X3, normal cognitive function. Motor Strength 5/5 right upper and right lower.  5/5 left upper and left lower extremities, DTRs 2+ intact and symmetric      LABS:        CBC Full  -  ( 29 Oct 2021 07:11 )  WBC Count : 8.22 K/uL  RBC Count : 4.34 M/uL  Hemoglobin : 13.8 g/dL  Hematocrit : 40.7 %  Platelet Count - Automated : 75 K/uL  Mean Cell Volume : 93.8 fl  Mean Cell Hemoglobin : 31.8 pg  Mean Cell Hemoglobin Concentration : 33.9 gm/dL  Auto Neutrophil # : x  Auto Lymphocyte # : x  Auto Monocyte # : x  Auto Eosinophil # : x  Auto Basophil # : x  Auto Neutrophil % : x  Auto Lymphocyte % : x  Auto Monocyte % : x  Auto Eosinophil % : x  Auto Basophil % : x    10-29    133<L>  |  96  |  18  ----------------------------<  161<H>  4.4   |  22  |  0.76    Ca    9.1      29 Oct 2021 07:12            CAPILLARY BLOOD GLUCOSE          RADIOLOGY & ADDITIONAL TESTS:      
81 year old female PMHX HTN, Glaucoma, HLD presented with one day history of SOB and wheezing.  Patient states she was in usual state of health until 10/26, when she started to feel wheezing after breakfast. She made her  breakfast and hten played bridge from 1-3, but after bridge game, she could not cath her breath. Patient tried to take robitussin as well as make hot water with lemon and inhale the steam, but she was continuing to worsen. Unable to eat her lunch or dinner due to lack of appetite.  Came to ED for further evaluation. Per patient no cough, no fever, chills. +sick contact in , had viral illness 2 weeks prior, with residual post-viral tussis at this time. No recent travel. No diarrhea, abodminal pain.   In ED, found to be hypercarbic with pH 7.25, placed on BIPAP, given solumedrol 125mg and duonebs along with levaquin.  Patient with no further episodes of shortness of breath. Sodium has improved.     MEDICATIONS  (STANDING):  albuterol/ipratropium for Nebulization 3 milliLiter(s) Nebulizer every 6 hours  atorvastatin 40 milliGRAM(s) Oral at bedtime  budesonide 160 MICROgram(s)/formoterol 4.5 MICROgram(s) Inhaler 2 Puff(s) Inhalation two times a day  carvedilol 6.25 milliGRAM(s) Oral every 12 hours  dorzolamide 2%/timolol 0.5% Ophthalmic Solution 1 Drop(s) Both EYES two times a day  latanoprost 0.005% Ophthalmic Solution 1 Drop(s) Right EYE at bedtime  melatonin 3 milliGRAM(s) Oral at bedtime  montelukast 10 milliGRAM(s) Oral daily  multivitamin 1 Tablet(s) Oral daily  pantoprazole    Tablet 40 milliGRAM(s) Oral before breakfast  polyethylene glycol 3350 17 Gram(s) Oral two times a day  predniSONE   Tablet 10 milliGRAM(s) Oral daily  senna 2 Tablet(s) Oral at bedtime    MEDICATIONS  (PRN):  acetaminophen     Tablet .. 650 milliGRAM(s) Oral every 6 hours PRN Temp greater or equal to 38C (100.4F), Mild Pain (1 - 3)  sodium chloride 0.65% Nasal 1 Spray(s) Both Nostrils four times a day PRN Nasal Congestion          VITALS:   T(C): 36.7 (10-31-21 @ 05:45), Max: 36.7 (10-31-21 @ 05:45)  HR: 82 (10-31-21 @ 05:45) (76 - 103)  BP: 145/65 (10-31-21 @ 05:45) (125/74 - 173/104)  RR: 18 (10-31-21 @ 05:45) (16 - 18)  SpO2: 94% (10-31-21 @ 05:45) (94% - 95%)  Wt(kg): --    PHYSICAL EXAM:  GENERAL: NAD, well nourished and conversant  HEAD:  Atraumatic  EYES: EOM, PERRLA, conjunctiva pink and sclera white  ENT: No tonsillar erythema, exudates, or enlargement, moist mucous membranes, good dentition, no lesions  NECK: Supple, No JVD, normal thyroid, carotids with normal upstrokes and no bruits  CHEST/LUNG: Clear to auscultation bilaterally, No rales, rhonchi, wheezing, or rubs  HEART: Regular rate and rhythm, No murmurs, rubs, or gallops  ABDOMEN: Soft, nondistended, no masses, guarding, tenderness or rebound, bowel sounds present  EXTREMITIES:  2+ Peripheral Pulses, No clubbing, cyanosis, or edema.   LYMPH: No lymphadenopathy noted  SKIN: No rashes or lesions  NERVOUS SYSTEM:  Alert & Oriented X3, normal cognitive function. Motor Strength 5/5 right upper and right lower.  5/5 left upper and left lower extremities, DTRs 2+ intact and symmetric    LABS:        CBC Full  -  ( 31 Oct 2021 07:49 )  WBC Count : 8.75 K/uL  RBC Count : 4.20 M/uL  Hemoglobin : 13.4 g/dL  Hematocrit : 39.9 %  Platelet Count - Automated : 100 K/uL  Mean Cell Volume : 95.0 fl  Mean Cell Hemoglobin : 31.9 pg  Mean Cell Hemoglobin Concentration : 33.6 gm/dL  Auto Neutrophil # : x  Auto Lymphocyte # : x  Auto Monocyte # : x  Auto Eosinophil # : x  Auto Basophil # : x  Auto Neutrophil % : x  Auto Lymphocyte % : x  Auto Monocyte % : x  Auto Eosinophil % : x  Auto Basophil % : x    10-31    135  |  98  |  25<H>  ----------------------------<  100<H>  4.0   |  24  |  0.91    Ca    8.8      31 Oct 2021 07:49            CAPILLARY BLOOD GLUCOSE          RADIOLOGY & ADDITIONAL TESTS:

## 2021-10-31 NOTE — PROGRESS NOTE ADULT - PROBLEM SELECTOR PLAN 2
-per renal
-per renal
started on budesonide  with albuterol as needed  Patient is  off steroids   appreciate pulmonaru input  viral panel neg  follow O2 sats
will switch to albuterol HFA  will need pulmonary eval  follow O2 sats
started on budesonide  with albuterol as needed  Patient is  off steroids   appreciate pulmonary  input  viral panel neg  O2 sats stable
will switch to albuterol HFA  will need pulmonary eval  will order viral panel  continue steroids as per pulmonology   follow O2 sats

## 2021-10-31 NOTE — PROGRESS NOTE ADULT - PROBLEM SELECTOR PROBLEM 2
Hyponatremia
Reactive airway disease
Reactive airway disease
Hyponatremia
Reactive airway disease
Reactive airway disease

## 2021-10-31 NOTE — PROGRESS NOTE ADULT - PROBLEM SELECTOR PLAN 1
No signs of active PNA  CXR neg  No leukocytosis  will hold abx for now  Passed swallow eval  Patient to be more cognisant of cutting up food into smaller pieces and taking smaller bites

## 2021-10-31 NOTE — SWALLOW BEDSIDE ASSESSMENT ADULT - SWALLOW EVAL: DIAGNOSIS
Pt p/w R/o PNA; Clear lungs on CT. Pt with 'choking' episode during hospital course. Oropharyngeal swallow skills p/w functional skills for continuation of regular textures and thin liquids

## 2021-10-31 NOTE — PROGRESS NOTE ADULT - PROBLEM SELECTOR PLAN 6
continue coreg, per patient her dose is 6.25mg BID.

## 2021-10-31 NOTE — DISCHARGE NOTE PROVIDER - HOSPITAL COURSE
81 year old female PMHX HTN, Glaucoma, HLD presented with one day history of SOB and wheezing.     Problem/Plan - 1:  ·  Problem: Community acquired pneumonia.   ·  Plan: No signs of active PNA  CXR neg  No leukocytosis  will hold abx for now  with episode of choking will get speech and swallow eval.    Problem/Plan - 2:  ·  Problem: Reactive airway disease.   ·  Plan: started on budesonide  with albuterol as needed  Patient is  off steroids   appreciate pulmonaru input  viral panel neg  follow O2 sats.    Problem/Plan - 3:  ·  Problem: Hyponatremia.   ·  Plan: discussed with nephrology   considering repeating lytes  Limit fluid intake.

## 2021-10-31 NOTE — PROGRESS NOTE ADULT - PROVIDER SPECIALTY LIST ADULT
Internal Medicine
ENT
Pulmonology
Internal Medicine
Pulmonology
Internal Medicine

## 2021-10-31 NOTE — DISCHARGE NOTE PROVIDER - NSDCMRMEDTOKEN_GEN_ALL_CORE_FT
Coreg 6.25 mg oral tablet: 1 tab(s) orally 2 times a day  dorzolamide-timolol 2%-0.5% preservative-free ophthalmic solution: 1 drop(s) to each affected eye 2 times a day  Flonase 50 mcg/inh nasal spray: 1 spray(s) nasal once a day, As Needed  latanoprost 0.005% ophthalmic solution: 1 drop(s) in the right eye once a day (in the evening)  levothyroxine 50 mcg (0.05 mg) oral tablet: 1 tab(s) orally once a day  Lipitor 40 mg oral tablet: 1 tab(s) orally once a day  melatonin 5 mg oral tablet: 1 tab(s) orally once a day (at bedtime)  Multiple Vitamins oral tablet: 1 tab(s) orally once a day  Saline Nasal Mist: 1 spray(s) nasal once a day (at bedtime)  thiamine: 1 tab(s) orally once a day  Vitamin C: 1 tab(s) orally once a day  Vitamin D3: 1 tab(s) orally once a day  vitamin E: 1 cap(s) orally once a day   Albuterol (Eqv-Proventil HFA) 90 mcg/inh inhalation aerosol: 2 puff(s) inhaled every 6 hours, As Needed for SOB or wheezing  budesonide-formoterol 160 mcg-4.5 mcg/inh inhalation aerosol: 2 puff(s) inhaled 2 times a day  Coreg 6.25 mg oral tablet: 1 tab(s) orally 2 times a day  dorzolamide-timolol 2%-0.5% preservative-free ophthalmic solution: 1 drop(s) to each affected eye 2 times a day  Flonase 50 mcg/inh nasal spray: 1 spray(s) nasal once a day, As Needed  latanoprost 0.005% ophthalmic solution: 1 drop(s) in the right eye once a day (in the evening)  levothyroxine 50 mcg (0.05 mg) oral tablet: 1 tab(s) orally once a day  Lipitor 40 mg oral tablet: 1 tab(s) orally once a day  melatonin 5 mg oral tablet: 1 tab(s) orally once a day (at bedtime)  montelukast 10 mg oral tablet: 1 tab(s) orally once a day  Multiple Vitamins oral tablet: 1 tab(s) orally once a day  pantoprazole 40 mg oral delayed release tablet: 1 tab(s) orally once a day (before a meal)  predniSONE 10 mg oral tablet: 1 tab(s) orally once a day x3 days, then  1/2 tab orally once a day x3 days and then stop  Saline Nasal Mist: 1 spray(s) nasal once a day (at bedtime)  senna oral tablet: 2 tab(s) orally once a day (at bedtime)  thiamine: 1 tab(s) orally once a day  Vitamin C: 1 tab(s) orally once a day  Vitamin D3: 1 tab(s) orally once a day  vitamin E: 1 cap(s) orally once a day

## 2021-10-31 NOTE — DISCHARGE NOTE PROVIDER - CARE PROVIDER_API CALL
Augustus Steven  GASTROENTEROLOGY  [address] [place] [address], [address]  [place] [address]  Phone: [phone]  Fax: [phone]  Follow Up Time:

## 2021-10-31 NOTE — PROGRESS NOTE ADULT - PROBLEM SELECTOR PROBLEM 1
Wheezing
Community acquired pneumonia

## 2021-10-31 NOTE — DISCHARGE NOTE PROVIDER - NSDCCPCAREPLAN_GEN_ALL_CORE_FT
PRINCIPAL DISCHARGE DIAGNOSIS  Diagnosis: Reactive airway disease  Assessment and Plan of Treatment:       SECONDARY DISCHARGE DIAGNOSES  Diagnosis: Pneumonia  Assessment and Plan of Treatment:     Diagnosis: Hyponatremia  Assessment and Plan of Treatment:     Diagnosis: Wheezing  Assessment and Plan of Treatment:     Diagnosis: HTN (hypertension)  Assessment and Plan of Treatment:      PRINCIPAL DISCHARGE DIAGNOSIS  Diagnosis: Reactive airway disease  Assessment and Plan of Treatment: Evaluated by Pulmonary team.  Condition treated with steroid, nbulizers, and inhalers.  Follow up with Pulmonary in 1 week. Call for appointment.      SECONDARY DISCHARGE DIAGNOSES  Diagnosis: Pneumonia  Assessment and Plan of Treatment: No pneumonia seen on CT scan chest.    Diagnosis: Hyponatremia  Assessment and Plan of Treatment: Condition now resolved.  Follow up with your primary doctor for repeat blood work in 1-2 weeks.    Diagnosis: Wheezing  Assessment and Plan of Treatment: Continue with medication & treatment as prescribed.    Diagnosis: HTN (hypertension)  Assessment and Plan of Treatment: Low salt diet  Activity as tolerated.  Take all medication as prescribed.  Follow up with your medical doctor for routine blood pressure monitoring at your next visit.  Notify your doctor if you have any of the following symptoms:   Dizziness, Lightheadedness, Blurry vision, Headache, Chest pain, Shortness of breath

## 2021-10-31 NOTE — PROGRESS NOTE ADULT - ASSESSMENT
80 y/o F with PMH of HTN, Glaucoma, HLD. Presented with SOB/wheezing x1 day. Reportedly with no cough, fever, or chills on admission but notes + sick contact in her  who had viral illness 2 weeks prior.   Per ED provider note, pt afebrile with mild leukocytosis, hypertensive & tachypneic. Found to be hypercarbic with pH 7.25, placed on BIPAP, given solumedrol 125mg and Duonebs with improvement in symptoms. Pulmonary called to consult for SOB/wheezing. 
80 yo female PMHX HTN, HLD, seasonal allergies presents with one day of hypoxia admitted for pneumonia. Found to be hyponatremia
81y with sob, indirect laryngoscopy reveals posterior arytenoid edema noted on indirect laryngoscopy, likely 2/2 GERD, no signs of obstruction.  
82 y/o F with PMH of HTN, Glaucoma, HLD. Presented with SOB/wheezing x1 day. Reportedly with no cough, fever, or chills on admission but notes + sick contact in her  who had viral illness 2 weeks prior.   Per ED provider note, pt afebrile with mild leukocytosis, hypertensive & tachypneic. Found to be hypercarbic with pH 7.25, placed on BIPAP, given solumedrol 125mg and Duonebs with improvement in symptoms. Pulmonary called to consult for SOB/wheezing. 
82 yo female PMHX HTN, HLD, seasonal allergies presents with one day of hypoxia. Found to be hyponatremia

## 2021-11-01 LAB
CULTURE RESULTS: SIGNIFICANT CHANGE UP
CULTURE RESULTS: SIGNIFICANT CHANGE UP
SPECIMEN SOURCE: SIGNIFICANT CHANGE UP
SPECIMEN SOURCE: SIGNIFICANT CHANGE UP

## 2021-12-27 ENCOUNTER — INPATIENT (INPATIENT)
Facility: HOSPITAL | Age: 81
LOS: 2 days | Discharge: HOME | DRG: 202 | End: 2021-12-30
Attending: INTERNAL MEDICINE | Admitting: INTERNAL MEDICINE
Payer: MEDICARE

## 2021-12-27 VITALS
RESPIRATION RATE: 30 BRPM | OXYGEN SATURATION: 100 % | DIASTOLIC BLOOD PRESSURE: 110 MMHG | HEART RATE: 102 BPM | WEIGHT: 164.91 LBS | TEMPERATURE: 98 F | HEIGHT: 63 IN | SYSTOLIC BLOOD PRESSURE: 203 MMHG

## 2021-12-27 DIAGNOSIS — I10 ESSENTIAL (PRIMARY) HYPERTENSION: ICD-10-CM

## 2021-12-27 DIAGNOSIS — H40.9 UNSPECIFIED GLAUCOMA: ICD-10-CM

## 2021-12-27 DIAGNOSIS — Z98.41 CATARACT EXTRACTION STATUS, RIGHT EYE: Chronic | ICD-10-CM

## 2021-12-27 DIAGNOSIS — J45.901 UNSPECIFIED ASTHMA WITH (ACUTE) EXACERBATION: ICD-10-CM

## 2021-12-27 DIAGNOSIS — E03.9 HYPOTHYROIDISM, UNSPECIFIED: ICD-10-CM

## 2021-12-27 DIAGNOSIS — R06.02 SHORTNESS OF BREATH: ICD-10-CM

## 2021-12-27 DIAGNOSIS — Z96.641 PRESENCE OF RIGHT ARTIFICIAL HIP JOINT: Chronic | ICD-10-CM

## 2021-12-27 LAB
ALBUMIN SERPL ELPH-MCNC: 4.4 G/DL — SIGNIFICANT CHANGE UP (ref 3.3–5)
ALP SERPL-CCNC: 59 U/L — SIGNIFICANT CHANGE UP (ref 40–120)
ALT FLD-CCNC: <5 U/L — LOW (ref 10–45)
ANION GAP SERPL CALC-SCNC: 12 MMOL/L — SIGNIFICANT CHANGE UP (ref 5–17)
ANION GAP SERPL CALC-SCNC: 12 MMOL/L — SIGNIFICANT CHANGE UP (ref 5–17)
AST SERPL-CCNC: 79 U/L — HIGH (ref 10–40)
BASE EXCESS BLDV CALC-SCNC: -1.7 MMOL/L — SIGNIFICANT CHANGE UP (ref -2–2)
BASOPHILS # BLD AUTO: 0.06 K/UL — SIGNIFICANT CHANGE UP (ref 0–0.2)
BASOPHILS NFR BLD AUTO: 0.5 % — SIGNIFICANT CHANGE UP (ref 0–2)
BILIRUB SERPL-MCNC: 0.5 MG/DL — SIGNIFICANT CHANGE UP (ref 0.2–1.2)
BUN SERPL-MCNC: 12 MG/DL — SIGNIFICANT CHANGE UP (ref 7–23)
BUN SERPL-MCNC: 12 MG/DL — SIGNIFICANT CHANGE UP (ref 7–23)
CA-I SERPL-SCNC: 1.27 MMOL/L — SIGNIFICANT CHANGE UP (ref 1.15–1.33)
CALCIUM SERPL-MCNC: 10.2 MG/DL — SIGNIFICANT CHANGE UP (ref 8.4–10.5)
CALCIUM SERPL-MCNC: 9.8 MG/DL — SIGNIFICANT CHANGE UP (ref 8.4–10.5)
CHLORIDE BLDV-SCNC: 96 MMOL/L — SIGNIFICANT CHANGE UP (ref 96–108)
CHLORIDE SERPL-SCNC: 96 MMOL/L — SIGNIFICANT CHANGE UP (ref 96–108)
CHLORIDE SERPL-SCNC: 96 MMOL/L — SIGNIFICANT CHANGE UP (ref 96–108)
CO2 BLDV-SCNC: 28 MMOL/L — HIGH (ref 22–26)
CO2 SERPL-SCNC: 21 MMOL/L — LOW (ref 22–31)
CO2 SERPL-SCNC: 24 MMOL/L — SIGNIFICANT CHANGE UP (ref 22–31)
CREAT SERPL-MCNC: 0.68 MG/DL — SIGNIFICANT CHANGE UP (ref 0.5–1.3)
CREAT SERPL-MCNC: 0.74 MG/DL — SIGNIFICANT CHANGE UP (ref 0.5–1.3)
EOSINOPHIL # BLD AUTO: 0.27 K/UL — SIGNIFICANT CHANGE UP (ref 0–0.5)
EOSINOPHIL NFR BLD AUTO: 2.5 % — SIGNIFICANT CHANGE UP (ref 0–6)
GAS PNL BLDV: 126 MMOL/L — LOW (ref 136–145)
GAS PNL BLDV: SIGNIFICANT CHANGE UP
GAS PNL BLDV: SIGNIFICANT CHANGE UP
GLUCOSE BLDV-MCNC: 141 MG/DL — HIGH (ref 70–99)
GLUCOSE SERPL-MCNC: 134 MG/DL — HIGH (ref 70–99)
GLUCOSE SERPL-MCNC: 152 MG/DL — HIGH (ref 70–99)
HCO3 BLDV-SCNC: 27 MMOL/L — SIGNIFICANT CHANGE UP (ref 22–29)
HCT VFR BLD CALC: 47.5 % — HIGH (ref 34.5–45)
HCT VFR BLDA CALC: 60 % — CRITICAL HIGH (ref 34.5–46.5)
HGB BLD CALC-MCNC: 19.9 G/DL — CRITICAL HIGH (ref 11.7–16.1)
HGB BLD-MCNC: 15.8 G/DL — HIGH (ref 11.5–15.5)
IMM GRANULOCYTES NFR BLD AUTO: 0.2 % — SIGNIFICANT CHANGE UP (ref 0–1.5)
LACTATE BLDV-MCNC: 1.6 MMOL/L — SIGNIFICANT CHANGE UP (ref 0.7–2)
LYMPHOCYTES # BLD AUTO: 3.83 K/UL — HIGH (ref 1–3.3)
LYMPHOCYTES # BLD AUTO: 35 % — SIGNIFICANT CHANGE UP (ref 13–44)
MCHC RBC-ENTMCNC: 31.6 PG — SIGNIFICANT CHANGE UP (ref 27–34)
MCHC RBC-ENTMCNC: 33.3 GM/DL — SIGNIFICANT CHANGE UP (ref 32–36)
MCV RBC AUTO: 95 FL — SIGNIFICANT CHANGE UP (ref 80–100)
MONOCYTES # BLD AUTO: 0.64 K/UL — SIGNIFICANT CHANGE UP (ref 0–0.9)
MONOCYTES NFR BLD AUTO: 5.8 % — SIGNIFICANT CHANGE UP (ref 2–14)
NEUTROPHILS # BLD AUTO: 6.13 K/UL — SIGNIFICANT CHANGE UP (ref 1.8–7.4)
NEUTROPHILS NFR BLD AUTO: 56 % — SIGNIFICANT CHANGE UP (ref 43–77)
NRBC # BLD: 0 /100 WBCS — SIGNIFICANT CHANGE UP (ref 0–0)
PCO2 BLDV: 57 MMHG — HIGH (ref 39–42)
PH BLDV: 7.28 — LOW (ref 7.32–7.43)
PLATELET # BLD AUTO: SIGNIFICANT CHANGE UP K/UL (ref 150–400)
PO2 BLDV: 56 MMHG — HIGH (ref 25–45)
POTASSIUM BLDV-SCNC: 4.8 MMOL/L — SIGNIFICANT CHANGE UP (ref 3.5–5.1)
POTASSIUM SERPL-MCNC: 4.6 MMOL/L — SIGNIFICANT CHANGE UP (ref 3.5–5.3)
POTASSIUM SERPL-MCNC: 7.6 MMOL/L — CRITICAL HIGH (ref 3.5–5.3)
POTASSIUM SERPL-SCNC: 4.6 MMOL/L — SIGNIFICANT CHANGE UP (ref 3.5–5.3)
POTASSIUM SERPL-SCNC: 7.6 MMOL/L — CRITICAL HIGH (ref 3.5–5.3)
PROT SERPL-MCNC: 7.5 G/DL — SIGNIFICANT CHANGE UP (ref 6–8.3)
RAPID RVP RESULT: SIGNIFICANT CHANGE UP
RBC # BLD: 5 M/UL — SIGNIFICANT CHANGE UP (ref 3.8–5.2)
RBC # FLD: 12.4 % — SIGNIFICANT CHANGE UP (ref 10.3–14.5)
SAO2 % BLDV: 87.8 % — SIGNIFICANT CHANGE UP (ref 67–88)
SARS-COV-2 RNA SPEC QL NAA+PROBE: SIGNIFICANT CHANGE UP
SODIUM SERPL-SCNC: 129 MMOL/L — LOW (ref 135–145)
SODIUM SERPL-SCNC: 132 MMOL/L — LOW (ref 135–145)
WBC # BLD: 10.95 K/UL — HIGH (ref 3.8–10.5)
WBC # FLD AUTO: 10.95 K/UL — HIGH (ref 3.8–10.5)

## 2021-12-27 PROCEDURE — 71045 X-RAY EXAM CHEST 1 VIEW: CPT | Mod: 26

## 2021-12-27 PROCEDURE — 99285 EMERGENCY DEPT VISIT HI MDM: CPT

## 2021-12-27 RX ORDER — ATORVASTATIN CALCIUM 80 MG/1
40 TABLET, FILM COATED ORAL AT BEDTIME
Refills: 0 | Status: DISCONTINUED | OUTPATIENT
Start: 2021-12-27 | End: 2021-12-30

## 2021-12-27 RX ORDER — SENNA PLUS 8.6 MG/1
2 TABLET ORAL AT BEDTIME
Refills: 0 | Status: DISCONTINUED | OUTPATIENT
Start: 2021-12-27 | End: 2021-12-30

## 2021-12-27 RX ORDER — SODIUM CHLORIDE 0.65 %
1 AEROSOL, SPRAY (ML) NASAL EVERY 4 HOURS
Refills: 0 | Status: DISCONTINUED | OUTPATIENT
Start: 2021-12-27 | End: 2021-12-30

## 2021-12-27 RX ORDER — LEVOTHYROXINE SODIUM 125 MCG
50 TABLET ORAL DAILY
Refills: 0 | Status: DISCONTINUED | OUTPATIENT
Start: 2021-12-27 | End: 2021-12-30

## 2021-12-27 RX ORDER — CARVEDILOL PHOSPHATE 80 MG/1
6.25 CAPSULE, EXTENDED RELEASE ORAL EVERY 12 HOURS
Refills: 0 | Status: DISCONTINUED | OUTPATIENT
Start: 2021-12-27 | End: 2021-12-30

## 2021-12-27 RX ORDER — MONTELUKAST 4 MG/1
10 TABLET, CHEWABLE ORAL DAILY
Refills: 0 | Status: DISCONTINUED | OUTPATIENT
Start: 2021-12-27 | End: 2021-12-30

## 2021-12-27 RX ORDER — LORATADINE 10 MG/1
10 TABLET ORAL DAILY
Refills: 0 | Status: DISCONTINUED | OUTPATIENT
Start: 2021-12-27 | End: 2021-12-29

## 2021-12-27 RX ORDER — PANTOPRAZOLE SODIUM 20 MG/1
40 TABLET, DELAYED RELEASE ORAL
Refills: 0 | Status: DISCONTINUED | OUTPATIENT
Start: 2021-12-27 | End: 2021-12-30

## 2021-12-27 RX ORDER — BUDESONIDE AND FORMOTEROL FUMARATE DIHYDRATE 160; 4.5 UG/1; UG/1
2 AEROSOL RESPIRATORY (INHALATION)
Refills: 0 | Status: DISCONTINUED | OUTPATIENT
Start: 2021-12-27 | End: 2021-12-30

## 2021-12-27 RX ORDER — DORZOLAMIDE HYDROCHLORIDE TIMOLOL MALEATE 20; 5 MG/ML; MG/ML
1 SOLUTION/ DROPS OPHTHALMIC
Refills: 0 | Status: DISCONTINUED | OUTPATIENT
Start: 2021-12-27 | End: 2021-12-30

## 2021-12-27 RX ORDER — IPRATROPIUM/ALBUTEROL SULFATE 18-103MCG
3 AEROSOL WITH ADAPTER (GRAM) INHALATION EVERY 6 HOURS
Refills: 0 | Status: DISCONTINUED | OUTPATIENT
Start: 2021-12-27 | End: 2021-12-30

## 2021-12-27 RX ORDER — MAGNESIUM SULFATE 500 MG/ML
2 VIAL (ML) INJECTION ONCE
Refills: 0 | Status: COMPLETED | OUTPATIENT
Start: 2021-12-27 | End: 2021-12-27

## 2021-12-27 RX ORDER — IPRATROPIUM/ALBUTEROL SULFATE 18-103MCG
3 AEROSOL WITH ADAPTER (GRAM) INHALATION ONCE
Refills: 0 | Status: COMPLETED | OUTPATIENT
Start: 2021-12-27 | End: 2021-12-27

## 2021-12-27 RX ORDER — LANOLIN ALCOHOL/MO/W.PET/CERES
5 CREAM (GRAM) TOPICAL AT BEDTIME
Refills: 0 | Status: DISCONTINUED | OUTPATIENT
Start: 2021-12-27 | End: 2021-12-30

## 2021-12-27 RX ORDER — FLUTICASONE PROPIONATE 50 MCG
1 SPRAY, SUSPENSION NASAL
Refills: 0 | Status: DISCONTINUED | OUTPATIENT
Start: 2021-12-27 | End: 2021-12-30

## 2021-12-27 RX ADMIN — Medication 5 MILLIGRAM(S): at 22:33

## 2021-12-27 RX ADMIN — DORZOLAMIDE HYDROCHLORIDE TIMOLOL MALEATE 1 DROP(S): 20; 5 SOLUTION/ DROPS OPHTHALMIC at 18:45

## 2021-12-27 RX ADMIN — Medication 3 MILLILITER(S): at 11:46

## 2021-12-27 RX ADMIN — Medication 40 MILLIGRAM(S): at 18:44

## 2021-12-27 RX ADMIN — SENNA PLUS 2 TABLET(S): 8.6 TABLET ORAL at 22:33

## 2021-12-27 RX ADMIN — Medication 2 GRAM(S): at 12:10

## 2021-12-27 RX ADMIN — Medication 3 MILLILITER(S): at 18:45

## 2021-12-27 RX ADMIN — MONTELUKAST 10 MILLIGRAM(S): 4 TABLET, CHEWABLE ORAL at 18:44

## 2021-12-27 RX ADMIN — Medication 150 GRAM(S): at 11:46

## 2021-12-27 RX ADMIN — LORATADINE 10 MILLIGRAM(S): 10 TABLET ORAL at 12:12

## 2021-12-27 RX ADMIN — ATORVASTATIN CALCIUM 40 MILLIGRAM(S): 80 TABLET, FILM COATED ORAL at 21:12

## 2021-12-27 RX ADMIN — CARVEDILOL PHOSPHATE 6.25 MILLIGRAM(S): 80 CAPSULE, EXTENDED RELEASE ORAL at 18:44

## 2021-12-27 RX ADMIN — BUDESONIDE AND FORMOTEROL FUMARATE DIHYDRATE 2 PUFF(S): 160; 4.5 AEROSOL RESPIRATORY (INHALATION) at 18:45

## 2021-12-27 NOTE — ED ADULT TRIAGE NOTE - BP NONINVASIVE DIASTOLIC (MM HG)
Pt reports bilateral wrist pain for several years. Reports compliance with wrist braces and medication with no relief. Denies any reinjury  
110

## 2021-12-27 NOTE — ED PROVIDER NOTE - ATTENDING CONTRIBUTION TO CARE
Patient with history of asthma presenting with acute exacerbation, already received nebs/steroids from EMS, still tachypneic and requiring supplemental O2, lungs diffusely wheezy/coarse but speaking and not in extremis.  Will continue nebs, treat with mag, will require admission.

## 2021-12-27 NOTE — ED PROVIDER NOTE - NS_BEDUNITTYPES_ED_ALL_ED
CRITICAL CARE PROGRESS NOTE      Patient:  Hermilo Jonescock    Unit/Bed:4D-09/009-A  YOB: 1951  MRN: [id number]   PCP: Henrey Goldberg, MD  Date of Admission: 10/27/2021  Chief Complaint:- Shortness of breath    Assessment and Plan:    1. Acute combined hypercapnic and hypoxic respiratory failure: Secondary to severe pneumonia with left-sided pleural effusion and repeated mucous plugging. Intubated 10/27/21. Extubated following successful SBT on 11/9/21. Chest x-ray today shows combination of left lung atelectasis most likely secondary to mucous plugging in addition to pleural effusion, The patient respiratory status declined to BiPAP with FiO2 of 80%. the patient does have mucous plug, proceeding with bronchoscopy. Start [place] Shock. 2. Severe bilateral multiorganism pneumonia: PCR positive Staph w/ MECA gene and adenovirus consistent w/ MRSA colonization. Culture growing MSSA. Repeat culture 11/5/21 shows coag positive staph completed 14 days Vancomycin (started 10/28/21) and completed 5 days Rocephin Clindamycin started for coverage of PVL as patient clinically worsening following 14 days Vancomycin for MRSA coverage. Underwent bronchoscopy for left mainstem mucus plug noted CXR on 11/11/21. Day 3/8 clindamycin . Bronchoscopy today, follow-up cultures. 3. Septic Shock: 2/2 severe PNA. CI 6.3 NE d/c 2/2 worsening afib RVR. NE d/c on 11/3/21 2/2 worsening afib/RVR. She was on pressor support w/ phenylephrine. She is off pressors today. 4. Stage II ALVIN on CKD 3: Suspect post renal etiology 2/2 staghorn calculi. Nephrology following. CRRT initiated 11/5/21 will continue for UF removal that was continued until 11/12, the patient is off CRRT. 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 ANCA associated Abs show elevated anti MPO elevated 416 and serine proteinase 3 IgG WNL. Will need Bx for confirmation.  Mitochondrial Abs pending, Anti-DS-DNA negative, Anti histone negative, Anti Smith,and Anti-Savana WNL,  GBM antibodies negative, C3/C4 levels normal, elevated kappa/lambda free light chains with normal ratio. No plans per Bx per nephrology. Fluid collection noted on left renal unlikely abscess as patient remains afebrile. She needs more fluid removal.  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 Will need Renal Bx for confirmation once stable. Discussed case w/ Nephrology. Completed pulse dose steroids starting on 11/8/21 w/  10mg/kg methylprednisolone for 3 days. SSI started for coverage. Not candidate for Rituxin/TPE 2/2 existing infection. No diffuse alveolar hemorrhage. 6. Hydronephrosis 2/2 Left-sided staghorn calculi: Urology following. Percutaneous drainage tube 11/2/21 IR. Low output following tube placement. Increased flow on CBI per urology recs. Still flushing clots. Perinephric stranding noted on CT abdomen and pelvis w/ air fluid region discontiguous with region of nephrostomy tube insertion raised initial concern for abscess (<3 cm) vs emphysematous pyelonephritis when discussed with radiologist. Received 5 days empiric therapy w/ cefepime (started 11/5/21). On Vancomycin per above. Culture of nephrostomy tube aspirate from 11/5/21 shows staph epidermidis (vancomycin sensitive). 7. Acute blood loss anemia: Suspect consumptive process. Leading differential , smear, reticulocytes, LDH, Haptoglobin, MERCEDES, B12/folate  pendingsuspect 2/2 hemolysis on CRRT. PRBC x1 transfused 10/31/21, 11/4/21, 11/5/21, x2 11/6/21, x3 11/8/21. Heparin stopped (dialysis dose still running). Holding ASA. 8. Thrombocytopenia: suspect consumptive process. Workup per above. Not heparin per above. 9. Hematuria: s/p perc tube placement. CBI per above. Stopped heparin, holding ASA. 10. Suspected Dysphagia: 2/2 prolonged intubation. SLP eval pending  11. Bilateral Pleural Effusion:   Associated volume overload in context of renal failure. Interval enlargement noted on CT 11/1/21.   12. Suspected COPD: current every day smoker. Obstructive process noted on flow volume loop on ventilator. Started empiric therapy with Stiolto. Recommend formal PFT once improved  13. PAH/chronic RV failure NYHA II: EF 55 to 60% G2 DD TTE 5/4/2021. RHC showed [place] 30 with elevated PCWP. Treated w/ Riociguat. 14. Normocytic Anemia: 2/2 hematuria s/p perc tube placement w/ Iron studies consistent w/  MERCEDES vs Anemia chronic disease. B12 WNL, Folate low, Absreticulocyte index 1.9% Soluable transferrin pending. Calculated Iron deficit 827mg. Will start Venofer replace folate once ABx stopped  15. Cholelithiasis: w/ dilated CBD. Noted on 10/30/2021 US liver/GB. GI following. Does not suspect choledocolithiasis at this time. May consider HIDA scan once stable   16. Leukocytosis: 2/2 to pulse dose steroids per above  17. Stage III decubitus ulcer with inferior tunneling: S/p excision VAC placement 8/21. Low suspicion for active infection  18. Moderate malnutrition: Pre-albumin 14.4 w/ notable cachexia  19. Chronic tobacco use:   cessation  20. Mild AS: Noted on previous TTE not appreciated on repeat imaging  21. Gout:  W/o exacerbation. Continue allopurinol  22. BARBER: non compliant w/ CPAP  23. New onset atrial fibrillation with rapid ventricular response (resolved): now in NSR s/p DCCV 11/3/21 Stopped Heparin per hematuria w/ anemia requiring transfusion & and now in NSR. Stopped Amiodarone 11/8/21. Holding metoprolol 2/2 hypotension  24. Hyperkalemia (resolved): 2/2 ALVIN  25. Hyponatremia (resolved): Suspect 2/2 renal failure. 26. Nutrition: on TF @30 ml       INITIAL H AND P AND ICU COURSE:  70F admitted [facility] w/ SOB. Current smoker w/ PMH PAH grp 3, HFpEF, stage III sacral ulcer s/p wound ostomy 9/21. Patient sister reports SpO2 51% at home and was brought to ED where ABG showed pH 7.19, PCO2 80, PO2 134. She required emergent intubation and was transferred to ICU.  Workup revealed left sided pleural effusion and underwent US guided thoracentesis w/ 1100 cc removed consistent w/ MRSA/adenovirus. Patient was noted to be in afib/RVR and was placed on amio, heparin drip. Patient was also noted to have normocytic anemia and received 1 unit PRBC 10/31/21. CT abdomen revealed CBD dilation w/ cholelithiasis. 11/1/2021: Plan for nephrostomy tubes today. Hemoglobin 7.7 s/p 1 unit PRBC yesterday. Weaned to 4 mics Levophed. 11/2/2021: Patient resumed back on prior dose of vancomycin. Low urine output w/ increasing pressor requirements today    11/3/2021: Will attempt SBT if able to wean today. Increased Amio drip 2/2 worsening tachycardia. Continued hematuria w/ low urine output. Cr stable. Will evaluate UTI following perc tube placement. Rise in WBC noted. Culture pending    11/4/2021: Cardioversion on 11/3/2021. Now and NSR. CVP 29 this a.m. Suspect drop in CI 2/2 to stopping levophed. Diffuse edema following transfusion overnight. Given one-time dose of 2 mg Bumex and albumin. SBT this AM    11/5/2021: Failed SBT yesterday. Continued low urine output. Trial of Bumex today. Will proceed with dialysis if fails to improve. 11/6/2021: Started on CRRT, continues to have bloody drainage of nephrostomy tube given 1 unit PRBC, cefipime emperically pending cultures of nephrostomy tube fluid and repeat resp cultures. 11/7/21: Patient remains clinically well on exam.  We'll continue medical of UF with CRRT. On empiric cefepime for coverage of perinephric abscess noted on CT abdomen and pelvis November 4, 2021. White count trending down. Will attempt spontaneous breathing trial in a.m.    11/8/21: Transfused 1x PRBC this AM. Anemia workup pending. WBC trending down. Patient ventilator settings this AM preclude SBT. Volume overloaded on exam. UF increased to 125 cc/hr this AM. Will attempt to wean vent settings further further. Coag neg staph growing on nephrostomy tube aspirate. Suspect contamination.  Continue existing ABx    11/9/21: Pulse dose steroids initiated overnight. Started on SSI. Urine output now improving 75cc/hr. Transfused 3 units PRBCs yesterday for Improve DO2. MERCEDES noted w/ 875 mg deficit. Will replace once steroids/ABx completed. 11/10/21 Extubated overnight. SLP eval today. Continue BiPAP while asleep w/ transition to NC while awake as tolerated. No tachypnea. Day 3 pulse dose steroids. Continued bloody drainage from [facility]rostomy tube. [facility]     11/11/21 Worsening bilateral infiltrates with SpO2 <90% on HFNC. Continued pulmonary hygiene w/ nebulized hypertonic saline, acapella and breathing treatments in addition to deep suctioning. Continued on Vancomycin for MRSA PNA. Plan for family meeting this AM to discuss goals of care. 11/12/21 Follow-up discussion from family meeting. Patient nodded agreement with full code status including reintubation leading to tracheostomy and PEG placement and continued hemodialysis if needed. Patient nodded agreement and understanding with these decisions. CXR yesterday evening did show left mainstem mucus plugging with cutoff sign and absent breath sounds and did undergo emergent bronchoscopy overnight on 11/12/21. Diminished breath sounds and worsening respiratory status this AM concerning for continued plugging. Stat chest X-ray ordered for confirmation. 11/13/21: Respiratory status continues to decline, she is requiring BiPAP with FiO2 of 70%. Chest x-ray shows complete opacification of the left lung secondary to mucous plugging, also the patient has left-sided pleural effusion. Plan for bronchoscopy. Past Medical History:  Per HPI. Family History:  DM in father and sister. Social History: chronic smoker.     ROS   Patient reports fatigue,  A 12 point review of systems was otherwise negative    Scheduled Meds:   midodrine  10 mg Oral Q8H    clindamycin (CLEOCIN) IV  600 mg IntraVENous Q8H    ketamine  50 mg IntraVENous Once    midazolam  2 mg IntraVENous Once    sodium chloride (Inhalant)  4 mL Nebulization 4 times per day    And    albuterol  2.5 mg Nebulization 4 times per day    insulin lispro  0-6 Units SubCUTAneous Q4H    phosphorus replacement protocol   Other RX Placeholder    potassium bicarb-citric acid  40 mEq Oral Once    tiotropium-olodaterol  2 puff Inhalation Daily    [Held by provider] aspirin  81 mg Oral Daily    pantoprazole  40 mg IntraVENous QAM    sodium hypochlorite   Irrigation Daily    Riociguat  2.5 mg Oral Q8H    sodium chloride flush  5-40 mL IntraVENous 2 times per day    allopurinol  300 mg Oral Daily    [Held by provider] FLUoxetine  40 mg Oral Daily    [Held by provider] metoprolol tartrate  12.5 mg Oral BID     Continuous Infusions:   phenylephrine (KRISTAL-SYNEPHRINE) 50mg/250mL infusion Stopped (11/12/21 1552)    dextrose      dexmedetomidine 0.5 mcg/kg/hr (11/13/21 0600)    bumetanide 0.1 mg/mL infusion 0.5 mg/hr (11/05/21 0904)    sodium chloride Stopped (11/12/21 0200)    [Held by provider] sodium chloride 50 mL/hr at 10/28/21 2314       PHYSICAL EXAMINATION:  T:  98.2. P: 73 RR:  22. B/P: 104/47 FiO2: 65. O2 Sat:  95%. I/O: 656/2530 net -1873  Body mass index is 39.07 kg/m². GCS:   8  PC: 16 PEEP: 8: TV: 400: RRTotal: 11: General: Acute on chronically ill-appearing elderly white female  HEENT:  normocephalic and atraumatic. No scleral icterus. PERR  Neck: supple. No Thyromegaly. Left subclavian dialysis catheter   Lungs: Diffuse rhonchi appreciated. Absent left lower lobe breath sounds. No retractions  Cardiac: RRR. No JVD. Abdomen: soft. Nontender. ,npehrostomy tube with bloody drainage  Extremities:  +1 pitting edema x4. (interval improvement noted) No clubbing, cyanosis   Vasculature: capillary refill < 3 seconds. Palpable dorsalis pedis pulses. Skin:  warm and dry. Psych: Alert and oriented to person place and time, affect appropriate  Lymph:  No supraclavicular adenopathy.   Neurologic:  No focal deficit. No seizures. Data: (All radiographs, tracings, PFTs, and imaging are personally viewed and interpreted unless otherwise noted).  CMP: Sodium 138 potassium 4.2 chloride 104 bicarb 25 BUN/creatinine 26/0.8 anion gap 9 ionized calcium 1.32 GFR 71 magnesium 2.4 glucose 102 calcium 8.6 phosphorus 2.6 albumin 3.1 alk phos 82 ALT/AST 10/9 total bilirubin 1.9 total protein 4.9   CBC WBC 15.3 H&H 7.3/23.3 platelets 73   SARS-CoV-2 NAAT negative on 10/27/2021   Telemetry shows NSR   Chest x-ray 10/31/2021 demonstrated diffuse bilateral patchy opacities with left-sided pleural effusion and cardiomegaly   Respiratory culture  11/5/21 pending   Pneumonia panel collected 10/27/2021 demonstrates staph aureus. MEC-A gene and adenovirus   CT chest on 11/1/2021 demonstrates bilateral pleural effusions with interval enlargement   CXR 11/3/2021 demonstrates worsening pulmonary congestion with bilateral effusions   UA shows bacteria w/ moderate white cells   CT Abdomen and pelvis 11/5/21 showed perinephric stranding with left-sided hypodense nodule with air-fluid level concerning for abscess. Diffuse ascites and anasarca noted.  CXR November 7, 2021 shows bilateral pulmonary edema with worsening right-sided infiltrate   CXR November 9, 2021 shows improvement in bilateral pulmonary edema with worsening left sided infiltrate    Repeat CXR 11/12/21 pending at time of evaluation.           Meets Continued ICU Level Care Criteria:    [x] Yes   [] No - Transfer Planned to listed location:  [] HOSPITALIST CONTACTED-    Ccm:32    Electronically signed by Michel Garcia MD  on 11/13/2021 at 7:32 AM MEDICINE

## 2021-12-27 NOTE — H&P ADULT - NSHPREVIEWOFSYSTEMS_GEN_ALL_CORE
REVIEW OF SYSTEMS:  CONSTITUTIONAL: No fever, change in weight, or fatigue  HEAD: No headache, dizziness or recent trauma  EYES: No eye pain, visual disturbances, or discharge  ENT:  No difficulty hearing, tinnitus, vertigo, No sinus or throat pain  NECK: No pain or stiffness  BREASTS: No pain, masses, or nipple discharge  RESPIRATORY: wheeze and SOB  CARDIOVASCULAR: No chest pain, palpitations, dizziness, CHF, arrhythmia, cardiomegaly or leg swelling  GASTROINTESTINAL: No abdominal or epigastric pain. No nausea, vomiting, or hematemesis, No diarrhea or constipation. No melena or hematochezia.  GENITOURINARY: No dysuria, frequency, hematuria, or incontinence  SKIN: No itching, burning, rashes, or lesions   LYMPH NODES: No history of enlarged glands  ENDOCRINE: No heat or cold intolerance, No hair loss. No osteoporosis or thyroid disease  MUSCULOSKELETAL: No joint pain or swelling, No muscle, back, or extremity pain  PSYCHIATRIC: No depression, anxiety, mood swings, or difficulty sleeping  HEME/LYMPH: No easy bruising, anticoagulants, bleeding disorder or bleeding gums  ALLERGY AND IMMUNOLOGIC: No hives or eczema  NEUROLOGICAL: No memory loss, loss of strength, numbness, or tremors

## 2021-12-27 NOTE — ED ADULT NURSE REASSESSMENT NOTE - NS ED NURSE REASSESS COMMENT FT1
decrease in wheezing s/p medications, pt reports breathing is easier and she is feeling better. less tachypnea and labored breathing noted. pt appears more comfortable.

## 2021-12-27 NOTE — ED PROVIDER NOTE - OBJECTIVE STATEMENT
80 yo F pmhx asthma htn hld recent admission for SOB 10/21 p/w SOB. Pt started having difficulty breathing last night with worsening this am. Tried albuterol with no relief. pt BIBEMS was sating low 90s put on O2 and given duoneb x2 and 125 solumedrol. On arrival diffuse wheezes throughout sating 97 on 4 L tachypneic . Pt says episode similar to previous episode. Denies f/c CP cough. Denies sick contacts. vaccinated for flu and covid.

## 2021-12-27 NOTE — H&P ADULT - HISTORY OF PRESENT ILLNESS
80 yo F pmhx asthma htn hld recent admission for SOB 10/21 p/w SOB. Pt started having difficulty breathing last night with worsening this am. Tried albuterol with no relief. pt BIBEMS was sating low 90s put on O2 and given duoneb x2 and 125 solumedrol. On arrival diffuse wheezes throughout sating 97 on 4 L tachypneic . Pt says episode similar to previous episode. Denies f/c CP cough. Denies sick contacts. vaccinated for flu and covid. Ptwas started on nebs and solumedrol with Good results. Patient seen now in the ED AAOx3, O2 sats were 97% on NC

## 2021-12-27 NOTE — H&P ADULT - DOES THIS PATIENT HAVE A HISTORY OF OR HAS BEEN DX WITH HEART FAILURE?
no Patient is an 82 y/o female with a PMH of breast ca, cva, pacemaker on aspirin daily, and DM presents to the ER  for evaluation of nausea, and nonbloody emesis that began that started yesterday night. She denied abdominal pain or diarrhea. Denied fevers or chills and unable to hold food down.    .

## 2021-12-27 NOTE — H&P ADULT - NSHPPHYSICALEXAM_GEN_ALL_CORE
PHYSICAL EXAM:  GENERAL: NAD, well nourished and conversant  HEAD:  Atraumatic  EYES: EOM, PERRLA, conjunctiva pink and sclera white  ENT: No tonsillar erythema, exudates, or enlargement, moist mucous membranes, good dentition, no lesions  NECK: Supple, No JVD, normal thyroid, carotids with normal upstrokes and no bruits  CHEST/LUNG: Clear to auscultation bilaterally, No rales, rhonchi, wheezing, or rubs  HEART: Regular rate and rhythm, No murmurs, rubs, or gallops  ABDOMEN: Soft, nondistended, no masses, guarding, tenderness or rebound, bowel sounds present  EXTREMITIES:  2+ Peripheral Pulses, No clubbing, cyanosis, or edema.   LYMPH: No lymphadenopathy noted  SKIN: No rashes or lesions  NERVOUS SYSTEM:  Alert & Oriented X3, normal cognitive function. Motor Strength 5/5 right upper and right lower.  5/5 left upper and left lower extremities, DTRs 2+ intact and symmetric

## 2021-12-27 NOTE — H&P ADULT - ASSESSMENT
82 yo woman presents with shortness of breath and wheezing. Patient has been hospitalized for asthma in the past. Has followed up with Dr Mabry for pulmonary

## 2021-12-27 NOTE — ED PROVIDER NOTE - PHYSICAL EXAMINATION
Gen: NAD, non-toxic appearing  Head: normal appearing  HEENT: normal conjunctiva, oral mucosa moist  Lung: no respiratory distress, speaking in full sentences, CTA b/l     CV: regular rate and rhythm, no murmurs  Abd: soft, non distended, non tender   MSK: no visible deformities  Neuro: No focal deficits, AAOx3  Skin: Warm  Psych: normal affect Gen: NAD, non-toxic appearing  Head: normal appearing  HEENT: normal conjunctiva, oral mucosa moist  Lung Respiratory distress, wheezes all lung fields   CV: regular rate and rhythm, no murmurs  Abd: soft, non distended, non tender   MSK: no visible deformities  Neuro: No focal deficits, AAOx3  Skin: Warm  Psych: normal affect

## 2021-12-27 NOTE — H&P ADULT - NSHPLABSRESULTS_GEN_ALL_CORE
15.8   10.95 )-----------( Clumped    ( 27 Dec 2021 12:26 )             47.5       12-27    132<L>  |  96  |  12  ----------------------------<  152<H>  4.6   |  24  |  0.74    Ca    10.2      27 Dec 2021 14:29    TPro  7.5  /  Alb  4.4  /  TBili  0.5  /  DBili  x   /  AST  79<H>  /  ALT  <5<L>  /  AlkPhos  59  12-27                      Lactate Trend            CAPILLARY BLOOD GLUCOSE

## 2021-12-27 NOTE — ED ADULT NURSE NOTE - NS ED NURSE RECORD ANOTHER HT AND WT
Type of outreach:  Phone, spoke to patient.  Patient has this done elsewhere but will send results when done         Yes

## 2021-12-27 NOTE — ED PROVIDER NOTE - CLINICAL SUMMARY MEDICAL DECISION MAKING FREE TEXT BOX
80yo F pmhx asthma p/w SOB 1 day. BIBEMS hypoxic given duoneb x2 solumedrol 125 put on 10 L. tachypneic increased WOB. afebrile VSS, put on 4L 98% O2. wheezes throughout lung fields. Concern for asthma exacerbation vs uri vs PNA vs covid vs allergic rxn less consistent with pe or chf. Will get labs cxr vbg give duoneb mg. likely admission.

## 2021-12-27 NOTE — ED ADULT NURSE NOTE - OBJECTIVE STATEMENT
80 y/o female presents to ED via EMS from home c/o wheezing and difficulty breathing since last night. Pt reports she had a similar episode a few months ago and was diagnosed w/ asthma, saw a pulmonologist who prescribed prn albuterol inhaler which she tried using last night w/o relief.  smokes at home but not inside the home. Denies n/v/d, fever, chest pain, abd pain, environmental/allergic exposure. A&Ox4, expiratory wheezing with diminished breath sounds in all lung fields, labored breathing to 30 breaths per min noted. EMS found pt to be 90% on RA upon arrival. Given 125 solumedrol IV and 2 duonebs en route.

## 2021-12-27 NOTE — H&P ADULT - PROBLEM SELECTOR PLAN 1
Patient seen in the ED resting comfortably  will continue nebs  started prednisone  follow O2 sats  Pulmonary called to see Pt

## 2021-12-27 NOTE — H&P ADULT - PROBLEM SELECTOR PLAN 2
continue coreg  will continue to monitor BP and adjust meds as needed  may need to switch off B Blocker with hx of asthma

## 2021-12-28 DIAGNOSIS — R06.00 DYSPNEA, UNSPECIFIED: ICD-10-CM

## 2021-12-28 DIAGNOSIS — D69.6 THROMBOCYTOPENIA, UNSPECIFIED: ICD-10-CM

## 2021-12-28 LAB
ANION GAP SERPL CALC-SCNC: 14 MMOL/L — SIGNIFICANT CHANGE UP (ref 5–17)
BASE EXCESS BLDV CALC-SCNC: 1 MMOL/L — SIGNIFICANT CHANGE UP (ref -2–2)
BUN SERPL-MCNC: 17 MG/DL — SIGNIFICANT CHANGE UP (ref 7–23)
CA-I SERPL-SCNC: 1.07 MMOL/L — LOW (ref 1.15–1.33)
CALCIUM SERPL-MCNC: 9.2 MG/DL — SIGNIFICANT CHANGE UP (ref 8.4–10.5)
CHLORIDE BLDV-SCNC: 100 MMOL/L — SIGNIFICANT CHANGE UP (ref 96–108)
CHLORIDE SERPL-SCNC: 99 MMOL/L — SIGNIFICANT CHANGE UP (ref 96–108)
CK MB CFR SERPL CALC: 2.3 NG/ML — SIGNIFICANT CHANGE UP (ref 0–3.8)
CO2 BLDV-SCNC: 25 MMOL/L — SIGNIFICANT CHANGE UP (ref 22–26)
CO2 SERPL-SCNC: 21 MMOL/L — LOW (ref 22–31)
CREAT SERPL-MCNC: 0.82 MG/DL — SIGNIFICANT CHANGE UP (ref 0.5–1.3)
GAS PNL BLDV: 130 MMOL/L — LOW (ref 136–145)
GAS PNL BLDV: SIGNIFICANT CHANGE UP
GAS PNL BLDV: SIGNIFICANT CHANGE UP
GLUCOSE BLDV-MCNC: 176 MG/DL — HIGH (ref 70–99)
GLUCOSE SERPL-MCNC: 134 MG/DL — HIGH (ref 70–99)
HCO3 BLDV-SCNC: 24 MMOL/L — SIGNIFICANT CHANGE UP (ref 22–29)
HCT VFR BLD CALC: 40.8 % — SIGNIFICANT CHANGE UP (ref 34.5–45)
HCT VFR BLDA CALC: 42 % — SIGNIFICANT CHANGE UP (ref 34.5–46.5)
HGB BLD CALC-MCNC: 14 G/DL — SIGNIFICANT CHANGE UP (ref 11.7–16.1)
HGB BLD-MCNC: 14 G/DL — SIGNIFICANT CHANGE UP (ref 11.5–15.5)
LACTATE BLDV-MCNC: 2.3 MMOL/L — HIGH (ref 0.7–2)
MCHC RBC-ENTMCNC: 31.7 PG — SIGNIFICANT CHANGE UP (ref 27–34)
MCHC RBC-ENTMCNC: 34.3 GM/DL — SIGNIFICANT CHANGE UP (ref 32–36)
MCV RBC AUTO: 92.3 FL — SIGNIFICANT CHANGE UP (ref 80–100)
NRBC # BLD: 0 /100 WBCS — SIGNIFICANT CHANGE UP (ref 0–0)
NT-PROBNP SERPL-SCNC: 65 PG/ML — SIGNIFICANT CHANGE UP (ref 0–300)
PCO2 BLDV: 32 MMHG — LOW (ref 39–42)
PH BLDV: 7.48 — HIGH (ref 7.32–7.43)
PLATELET # BLD AUTO: 92 K/UL — LOW (ref 150–400)
PO2 BLDV: 69 MMHG — HIGH (ref 25–45)
POTASSIUM BLDV-SCNC: 4.6 MMOL/L — SIGNIFICANT CHANGE UP (ref 3.5–5.1)
POTASSIUM SERPL-MCNC: 4.2 MMOL/L — SIGNIFICANT CHANGE UP (ref 3.5–5.3)
POTASSIUM SERPL-SCNC: 4.2 MMOL/L — SIGNIFICANT CHANGE UP (ref 3.5–5.3)
RBC # BLD: 4.42 M/UL — SIGNIFICANT CHANGE UP (ref 3.8–5.2)
RBC # FLD: 12.5 % — SIGNIFICANT CHANGE UP (ref 10.3–14.5)
SAO2 % BLDV: 97 % — HIGH (ref 67–88)
SODIUM SERPL-SCNC: 134 MMOL/L — LOW (ref 135–145)
TROPONIN T, HIGH SENSITIVITY RESULT: <6 NG/L — SIGNIFICANT CHANGE UP (ref 0–51)
WBC # BLD: 10.74 K/UL — HIGH (ref 3.8–10.5)
WBC # FLD AUTO: 10.74 K/UL — HIGH (ref 3.8–10.5)

## 2021-12-28 PROCEDURE — 93010 ELECTROCARDIOGRAM REPORT: CPT

## 2021-12-28 PROCEDURE — 93970 EXTREMITY STUDY: CPT | Mod: 26

## 2021-12-28 RX ADMIN — CARVEDILOL PHOSPHATE 6.25 MILLIGRAM(S): 80 CAPSULE, EXTENDED RELEASE ORAL at 17:10

## 2021-12-28 RX ADMIN — DORZOLAMIDE HYDROCHLORIDE TIMOLOL MALEATE 1 DROP(S): 20; 5 SOLUTION/ DROPS OPHTHALMIC at 05:50

## 2021-12-28 RX ADMIN — Medication 3 MILLILITER(S): at 14:06

## 2021-12-28 RX ADMIN — PANTOPRAZOLE SODIUM 40 MILLIGRAM(S): 20 TABLET, DELAYED RELEASE ORAL at 05:49

## 2021-12-28 RX ADMIN — Medication 3 MILLILITER(S): at 05:49

## 2021-12-28 RX ADMIN — BUDESONIDE AND FORMOTEROL FUMARATE DIHYDRATE 2 PUFF(S): 160; 4.5 AEROSOL RESPIRATORY (INHALATION) at 17:10

## 2021-12-28 RX ADMIN — Medication 50 MICROGRAM(S): at 05:49

## 2021-12-28 RX ADMIN — BUDESONIDE AND FORMOTEROL FUMARATE DIHYDRATE 2 PUFF(S): 160; 4.5 AEROSOL RESPIRATORY (INHALATION) at 05:47

## 2021-12-28 RX ADMIN — DORZOLAMIDE HYDROCHLORIDE TIMOLOL MALEATE 1 DROP(S): 20; 5 SOLUTION/ DROPS OPHTHALMIC at 17:11

## 2021-12-28 RX ADMIN — CARVEDILOL PHOSPHATE 6.25 MILLIGRAM(S): 80 CAPSULE, EXTENDED RELEASE ORAL at 05:49

## 2021-12-28 RX ADMIN — ATORVASTATIN CALCIUM 40 MILLIGRAM(S): 80 TABLET, FILM COATED ORAL at 21:07

## 2021-12-28 RX ADMIN — SENNA PLUS 2 TABLET(S): 8.6 TABLET ORAL at 21:06

## 2021-12-28 RX ADMIN — Medication 40 MILLIGRAM(S): at 05:51

## 2021-12-28 RX ADMIN — Medication 5 MILLIGRAM(S): at 21:05

## 2021-12-28 RX ADMIN — Medication 3 MILLILITER(S): at 20:44

## 2021-12-28 RX ADMIN — Medication 1 SPRAY(S): at 05:50

## 2021-12-28 NOTE — PROVIDER CONTACT NOTE (OTHER) - ASSESSMENT
pt resting in bed at this time, RN attempted to place new PIV but pt wanted it in the "same spot" as the previous, pt educated on importance of PIV for emergencies and pt states she will consider one in the morning

## 2021-12-28 NOTE — PROGRESS NOTE ADULT - PROBLEM SELECTOR PLAN 1
Patient with no new episodes of SOB  will continue nebs  continue prednisone  follow O2 sats  appreciate pulmonary input

## 2021-12-28 NOTE — CONSULT NOTE ADULT - ASSESSMENT
80 y/o F with PMH of glaucoma, hypothyroid, HLD, HTN, with recent admission for dyspnea in October 2nd suspected viral etiology now presenting with acute onset dyspnea on 12/26. Patient was awoken out of sleep with severe dyspnea with self-reported wheezing that did not resolve with Albuterol x3. She denies feeling ill in the days prior, denies sick contacts. States dyspnea was very acute but then states she felt more dyspneic with exertion during the day 12/26 p/w SOB. Currently 95% on RA

## 2021-12-28 NOTE — CONSULT NOTE ADULT - PROBLEM SELECTOR RECOMMENDATION 9
Acute onset dyspnea, now improved  -RVP/COVID neg  -CXR clear  -No wheezing on exam  -CT chest in October was normal  -?cardiac etiology ?PAF  -Check CE x1, EKG  -Change duoneb to PRN  -Symbicort BID  -Prednisone 40mg qd  -Repeat VBG ordered Acute onset dyspnea, now improved with steroids ?reactive airway disease  -RVP/COVID neg  -CXR clear  -No wheezing on exam  -CT chest in October was normal  -Check CE x1, EKG  -Change duoneb to PRN  -Symbicort BID  -c/w Singulair 10mg qd  -Prednisone 40mg qd  -Will need outpt PFTs to r/o obstructive lung disease   -Repeat VBG ordered

## 2021-12-28 NOTE — CONSULT NOTE ADULT - SUBJECTIVE AND OBJECTIVE BOX
PULMONARY CONSULT    HPI: 80 y/o F with PMH of glaucoma, hypothyroid, HLD, HTN, with recent admission for dyspnea in October 2nd suspected viral etiology now presenting with acute onset dyspnea on 12/26. Patient was awoken out of sleep with severe dyspnea with self-reported wheezing that did not resolve with Albuterol x3. She denies feeling ill in the days prior, denies sick contacts. States dyspnea was very acute but then states she felt more dyspneic with exertion during the day 12/26 p/w SOB. Currently 95% on RA    PAST MEDICAL & SURGICAL HISTORY:  Osteoarthritis of hip  right    HTN (hypertension)    Glaucoma    HLD (hyperlipidemia)    Hypothyroid    LBP (low back pain)    S/P hip replacement, right    S/P right cataract extraction      Allergies    penicillins (Rash)  strawberry (Unknown)    Intolerances      FAMILY HISTORY:  No pertinent family history in first degree relatives      Social history: No smoker, +2nd hand exposure from     Review of Systems:  CONSTITUTIONAL: No fever, chills, or fatigue  EYES: No eye pain, visual disturbances, or discharge  ENMT:  No difficulty hearing, tinnitus, vertigo; No sinus or throat pain  NECK: No pain or stiffness  RESPIRATORY: Per above  CARDIOVASCULAR: No chest pain, palpitations, dizziness, or leg swelling  GASTROINTESTINAL: No abdominal or epigastric pain. No nausea, vomiting, or hematemesis; No diarrhea or constipation. No melena or hematochezia.  GENITOURINARY: No dysuria, frequency, hematuria, or incontinence  NEUROLOGICAL: No headaches, memory loss, loss of strength, numbness, or tremors  SKIN: No itching, burning, rashes, or lesions   MUSCULOSKELETAL: No joint pain or swelling; No muscle, back, or extremity pain  PSYCHIATRIC: No depression, anxiety, mood swings, or difficulty sleeping      Medications:  MEDICATIONS  (STANDING):  albuterol/ipratropium for Nebulization 3 milliLiter(s) Nebulizer every 6 hours  atorvastatin 40 milliGRAM(s) Oral at bedtime  budesonide 160 MICROgram(s)/formoterol 4.5 MICROgram(s) Inhaler 2 Puff(s) Inhalation two times a day  carvedilol 6.25 milliGRAM(s) Oral every 12 hours  dorzolamide 2%/timolol 0.5% Ophthalmic Solution 1 Drop(s) Both EYES two times a day  levothyroxine 50 MICROGram(s) Oral daily  loratadine 10 milliGRAM(s) Oral daily  montelukast 10 milliGRAM(s) Oral daily  pantoprazole    Tablet 40 milliGRAM(s) Oral before breakfast  predniSONE   Tablet 40 milliGRAM(s) Oral daily  senna 2 Tablet(s) Oral at bedtime    MEDICATIONS  (PRN):  fluticasone propionate 50 MICROgram(s)/spray Nasal Spray 1 Spray(s) Both Nostrils two times a day PRN nasal congestion  melatonin 5 milliGRAM(s) Oral at bedtime PRN Insomnia  sodium chloride 0.65% Nasal 1 Spray(s) Alternating Nostrils every 4 hours PRN Congestion            Vital Signs Last 24 Hrs  T(C): 36.9 (28 Dec 2021 05:38), Max: 36.9 (27 Dec 2021 19:52)  T(F): 98.4 (28 Dec 2021 05:38), Max: 98.4 (27 Dec 2021 19:52)  HR: 81 (28 Dec 2021 05:38) (81 - 109)  BP: 125/73 (28 Dec 2021 05:38) (125/73 - 173/99)  BP(mean): --  RR: 18 (28 Dec 2021 05:38) (18 - 26)  SpO2: 95% (28 Dec 2021 05:38) (95% - 97%) on RA      VBG pH 7.28 12-27 @ 12:26  VBG pCO2 57 12-27 @ 12:26  VBG O2 sat 87.8 12-27 @ 12:26  VBG lactate 1.6 12-27 @ 12:26        12-27 @ 07:01  -  12-28 @ 07:00  --------------------------------------------------------  IN: 340 mL / OUT: 0 mL / NET: 340 mL          LABS:                        14.0   10.74 )-----------( 92       ( 28 Dec 2021 07:32 )             40.8     12-28    134<L>  |  99  |  17  ----------------------------<  134<H>  4.2   |  21<L>  |  0.82    Ca    9.2      28 Dec 2021 07:31    TPro  7.5  /  Alb  4.4  /  TBili  0.5  /  DBili  x   /  AST  79<H>  /  ALT  <5<L>  /  AlkPhos  59  12-27          CAPILLARY BLOOD GLUCOSE                          CULTURES: (if applicable)  COVID neg  RVP neg      Physical Examination:    General: No acute distress.      HEENT: Pupils equal, reactive to light.  Symmetric.    PULM: Clear to auscultation bilaterally, no significant sputum production    CVS: S1, S2    ABD: Soft, nondistended, nontender, normoactive bowel sounds, no masses    EXT: No edema, nontender    SKIN: Warm and well perfused, no rashes noted.    NEURO: Alert, oriented, interactive, nonfocal    RADIOLOGY REVIEWED  CT chest: c< from: CT Chest No Cont (10.28.21 @ 20:16) >  FINDINGS:    LUNGS AND AIRWAYS: Patent central airways.  Clear lungs.  PLEURA: No pleural effusion.  MEDIASTINUM AND LUIS: No lymphadenopathy.  VESSELS: Calcified atherosclerotic plaques in the coronary arteries. Normal caliber aorta and main pulmonary artery.  HEART: Heart size is normal. No pericardial effusion.  CHEST WALL AND LOWER NECK: Within normal limits.  VISUALIZED UPPER ABDOMEN: Moderate size hiatal hernia.  BONES: Degenerative changes of the spine.    IMPRESSION:  Clear lungs.    < end of copied text >    CXR 12/27: Clear lungs

## 2021-12-29 ENCOUNTER — TRANSCRIPTION ENCOUNTER (OUTPATIENT)
Age: 81
End: 2021-12-29

## 2021-12-29 DIAGNOSIS — N17.9 ACUTE KIDNEY FAILURE, UNSPECIFIED: ICD-10-CM

## 2021-12-29 LAB
ANION GAP SERPL CALC-SCNC: 14 MMOL/L — SIGNIFICANT CHANGE UP (ref 5–17)
BUN SERPL-MCNC: 24 MG/DL — HIGH (ref 7–23)
CALCIUM SERPL-MCNC: 9.3 MG/DL — SIGNIFICANT CHANGE UP (ref 8.4–10.5)
CHLORIDE SERPL-SCNC: 101 MMOL/L — SIGNIFICANT CHANGE UP (ref 96–108)
CO2 SERPL-SCNC: 22 MMOL/L — SIGNIFICANT CHANGE UP (ref 22–31)
CREAT SERPL-MCNC: 1 MG/DL — SIGNIFICANT CHANGE UP (ref 0.5–1.3)
GLUCOSE SERPL-MCNC: 98 MG/DL — SIGNIFICANT CHANGE UP (ref 70–99)
HCT VFR BLD CALC: 40.1 % — SIGNIFICANT CHANGE UP (ref 34.5–45)
HGB BLD-MCNC: 13.4 G/DL — SIGNIFICANT CHANGE UP (ref 11.5–15.5)
LACTATE SERPL-SCNC: 1.3 MMOL/L — SIGNIFICANT CHANGE UP (ref 0.7–2)
MCHC RBC-ENTMCNC: 31.8 PG — SIGNIFICANT CHANGE UP (ref 27–34)
MCHC RBC-ENTMCNC: 33.4 GM/DL — SIGNIFICANT CHANGE UP (ref 32–36)
MCV RBC AUTO: 95 FL — SIGNIFICANT CHANGE UP (ref 80–100)
NRBC # BLD: 0 /100 WBCS — SIGNIFICANT CHANGE UP (ref 0–0)
PLATELET # BLD AUTO: 79 K/UL — LOW (ref 150–400)
POTASSIUM SERPL-MCNC: 4.3 MMOL/L — SIGNIFICANT CHANGE UP (ref 3.5–5.3)
POTASSIUM SERPL-SCNC: 4.3 MMOL/L — SIGNIFICANT CHANGE UP (ref 3.5–5.3)
RBC # BLD: 4.22 M/UL — SIGNIFICANT CHANGE UP (ref 3.8–5.2)
RBC # BLD: 4.22 M/UL — SIGNIFICANT CHANGE UP (ref 3.8–5.2)
RBC # FLD: 13 % — SIGNIFICANT CHANGE UP (ref 10.3–14.5)
RETICS #: 77.2 K/UL — SIGNIFICANT CHANGE UP (ref 25–125)
RETICS/RBC NFR: 1.8 % — SIGNIFICANT CHANGE UP (ref 0.5–2.5)
SODIUM SERPL-SCNC: 137 MMOL/L — SIGNIFICANT CHANGE UP (ref 135–145)
WBC # BLD: 10.52 K/UL — HIGH (ref 3.8–10.5)
WBC # FLD AUTO: 10.52 K/UL — HIGH (ref 3.8–10.5)

## 2021-12-29 RX ORDER — BENZOCAINE AND MENTHOL 5; 1 G/100ML; G/100ML
1 LIQUID ORAL EVERY 6 HOURS
Refills: 0 | Status: DISCONTINUED | OUTPATIENT
Start: 2021-12-29 | End: 2021-12-30

## 2021-12-29 RX ORDER — TIOTROPIUM BROMIDE 18 UG/1
1 CAPSULE ORAL; RESPIRATORY (INHALATION) DAILY
Refills: 0 | Status: DISCONTINUED | OUTPATIENT
Start: 2021-12-29 | End: 2021-12-30

## 2021-12-29 RX ORDER — SODIUM CHLORIDE 9 MG/ML
1000 INJECTION INTRAMUSCULAR; INTRAVENOUS; SUBCUTANEOUS
Refills: 0 | Status: DISCONTINUED | OUTPATIENT
Start: 2021-12-29 | End: 2021-12-30

## 2021-12-29 RX ADMIN — Medication 3 MILLILITER(S): at 14:45

## 2021-12-29 RX ADMIN — DORZOLAMIDE HYDROCHLORIDE TIMOLOL MALEATE 1 DROP(S): 20; 5 SOLUTION/ DROPS OPHTHALMIC at 17:51

## 2021-12-29 RX ADMIN — Medication 5 MILLIGRAM(S): at 21:50

## 2021-12-29 RX ADMIN — Medication 3 MILLILITER(S): at 09:24

## 2021-12-29 RX ADMIN — Medication 3 MILLILITER(S): at 20:55

## 2021-12-29 RX ADMIN — BENZOCAINE AND MENTHOL 1 LOZENGE: 5; 1 LIQUID ORAL at 10:05

## 2021-12-29 RX ADMIN — BUDESONIDE AND FORMOTEROL FUMARATE DIHYDRATE 2 PUFF(S): 160; 4.5 AEROSOL RESPIRATORY (INHALATION) at 05:43

## 2021-12-29 RX ADMIN — Medication 1 SPRAY(S): at 09:26

## 2021-12-29 RX ADMIN — ATORVASTATIN CALCIUM 40 MILLIGRAM(S): 80 TABLET, FILM COATED ORAL at 21:50

## 2021-12-29 RX ADMIN — CARVEDILOL PHOSPHATE 6.25 MILLIGRAM(S): 80 CAPSULE, EXTENDED RELEASE ORAL at 17:50

## 2021-12-29 RX ADMIN — Medication 40 MILLIGRAM(S): at 05:42

## 2021-12-29 RX ADMIN — MONTELUKAST 10 MILLIGRAM(S): 4 TABLET, CHEWABLE ORAL at 09:24

## 2021-12-29 RX ADMIN — SENNA PLUS 2 TABLET(S): 8.6 TABLET ORAL at 21:50

## 2021-12-29 RX ADMIN — DORZOLAMIDE HYDROCHLORIDE TIMOLOL MALEATE 1 DROP(S): 20; 5 SOLUTION/ DROPS OPHTHALMIC at 05:42

## 2021-12-29 RX ADMIN — PANTOPRAZOLE SODIUM 40 MILLIGRAM(S): 20 TABLET, DELAYED RELEASE ORAL at 05:42

## 2021-12-29 RX ADMIN — Medication 1 SPRAY(S): at 09:24

## 2021-12-29 RX ADMIN — BUDESONIDE AND FORMOTEROL FUMARATE DIHYDRATE 2 PUFF(S): 160; 4.5 AEROSOL RESPIRATORY (INHALATION) at 17:50

## 2021-12-29 RX ADMIN — Medication 50 MICROGRAM(S): at 05:41

## 2021-12-29 RX ADMIN — TIOTROPIUM BROMIDE 1 CAPSULE(S): 18 CAPSULE ORAL; RESPIRATORY (INHALATION) at 23:37

## 2021-12-29 RX ADMIN — CARVEDILOL PHOSPHATE 6.25 MILLIGRAM(S): 80 CAPSULE, EXTENDED RELEASE ORAL at 05:41

## 2021-12-29 NOTE — DISCHARGE NOTE PROVIDER - PROVIDER TOKENS
PROVIDER:[TOKEN:[[id number]],FOLLOWUP:[1 week]] PROVIDER:[TOKEN:[[id number]],SCHEDULEDAPPT:[01/10/2021]],FREE:[LAST:[Medical Doctor],FIRST:[Primary],PHONE:[(   )    -],FAX:[(   )    -],FOLLOWUP:[1 week]]

## 2021-12-29 NOTE — PROGRESS NOTE ADULT - PROBLEM SELECTOR PLAN 1
Patient with no new episodes of SOB  will continue nebs  taper steroids   follow O2 sats  appreciate pulmonary input

## 2021-12-29 NOTE — DISCHARGE NOTE PROVIDER - HOSPITAL COURSE
80 yo woman presents with shortness of breath and wheezing. Patient has been hospitalized for asthma in the past. Patient seen and evaluated by Pulm. Placed on PO steroid with clinical improvement. Hypoxia resolved and stable on RA. Patient remains stable for DC with close follow up with PCP and Pulm as per Dr. Ruiz 80 yo woman presents with shortness of breath and wheezing. Patient has been hospitalized for asthma in the past. Patient seen and evaluated by Pulm. Placed on PO steroid with clinical improvement. Hypoxia resolved and stable on RA. Patient remains stable for DC with close follow up with PCP and Pulm.

## 2021-12-29 NOTE — DISCHARGE NOTE PROVIDER - NSDCCPCAREPLAN_GEN_ALL_CORE_FT
PRINCIPAL DISCHARGE DIAGNOSIS  Diagnosis: SOB (shortness of breath)  Assessment and Plan of Treatment: HOME CARE INSTRUCTIONS  Take medicines as directed by your caregiver.  Control your home environment in the following ways to help prevent asthma attacks:  Change your heating and air conditioning filter at least once a month.  Do not Informed pt of the various negative side effects of smoking including risk of COPD, Lung Ca etc  Strongly recommended that pt stops smoking and pt given various options of smoking cessasion tools such as NRT's and other pharmacotherapies. Do not stay in places where others are smoking.  Get rid of pests (such as roaches and mice) and their droppings.  If you see mold on a plant, throw it away.  Clean your floors and dust every week. Use unscented cleaning products. Use a vacuum  with a HEPA filter if possible. If vacuuming or cleaning triggers your asthma, try to find someone else to do these chores..  Use allergy-proof pillows, mattress covers, and box spring covers.  Wash bedsheets and blankets every week in hot water and dry in a dryer.  Use a blanket that is made of polyester or cotton with a tight nap.  Clean bathrooms and zachariah with bleach and repaint with mold-resistant paint.   Wash hands frequently.  Talk to your caregiver about an action plan for managing asthma attacks. This includes the use of a peak flow meter which measures the severity of the attack and medicines that can help stop the attack. An action plan can help minimize or stop the attack without having to seek medical care.  Always have a plan prepared for seeking medical attention. This should include contacting your caregiver and in the case of a severe attack, calling your local emergency services  SEEK MEDICAL CARE IF:  You have wheezing, shortness of breath, or a cough even if taking medicine to prevent attacks.   You have thickening of sputum.   Your sputum changes from clear or white to yellow, green, gray, or bloody.   You have any problems that may be related to the medicines you are taking (such as a rash, itching, swelling, or trouble breathing).  You are using a      SECONDARY DISCHARGE DIAGNOSES  Diagnosis: HTN (hypertension)  Assessment and Plan of Treatment: Hypertension, also known simply as "high blood pressure" is very common, however can lead to many significant complications if left uncontrolled. When the blood pressure is elevated, the force the blood puts on the walls of the arteries is high and can lead to artery damage. Also, when the heart muscle has to pump blood against a high blood pressure, it thickens and enlarges, just like any muscle does when it has to do more work (think of a weight ). When the blood pressure is very high, people may feel a headache or tired. Some people can feel pounding in their head or have blurry vision. Hearing the heart beating in the ear especially at night can be a sign of high blood pressure. Eventually, symptoms of stroke, heart attack, heart failure or irregular heartbeats can occur  - Exercise: Doing cardiovascular exercise such as running, biking or swimming at least 30 minutes per day most days of the week is recommended to help keep blood pressure healthy  - Lose weight: Maintaining a normal BMI (body mass index) is very important in keeping blood pressure readings normal   - Avoid salt: Sodium in the diet increases the blood pressure in many ways. Salt comes in many foods, so just because you don't add salt to your food it does not mean that you are eating a low salt diet. Read labels and keep sodium intake to less than 2000 mg per day   - Avoid alcohol: Even 1 or 2 alcoholic drinks can significantly increase blood pressure   - DASH Diet: The DASH diet has been shown to reduce blood pressure   - Take all medication as prescribed.   - Follow up with your medical doctor for routine blood pressure monitoring at your next visit.   - Notify your doctor if you have any of the following symptoms:    - Dizziness, Lightheadedness, Blurry vision, Headache, Chest pain, Shortness of breath      Diagnosis: Thrombocytopenia  Assessment and Plan of Treatment: HOME CARE INSTRUCTIONS  Check the skin and linings inside your mouth for bruising or bleeding as directed by your caregiver.  Check your sputum, urine, and stool for blood as directed by your caregiver.  Do not return to any activities that could cause bumps or bruises until your caregiver says it is okay.  Take extra care not to cut yourself when shaving or when using scissors, needles, knives, and other tools.  Take extra care not to burn yourself when ironing or cooking.   Ask your caregiver if it is okay for you to drink alcohol.  Only take over-the-counter or prescription medicines as directed by your caregiver.  Notify all your caregivers, including dentists and eye doctors, about your condition.  SEEK IMMEDIATE MEDICAL CARE IF:  You develop active bleeding from anywhere in your body.   You develop unexplained bruising or bleeding.  You have blood in your sputum, urine, or stool      Diagnosis: Hypothyroid  Assessment and Plan of Treatment: you do not make enough thyroid hormone  signs & symptoms of low levels of thyroid hormone - tired, getting cold easily, coarse or thin hair, constipation, shortness of breath, swelling, irregular periods  your doctor will do thyroid hormone blood tests at least once a year to monitor if medication dose is adequate  take your thyroid medicine as directed by your doctor & on empty stomach       PRINCIPAL DISCHARGE DIAGNOSIS  Diagnosis: SOB (shortness of breath)  Assessment and Plan of Treatment: HOME CARE INSTRUCTIONS  Take inhaled and oral medicine as directed by your caregiver.  You are to follow up with Dr. Borjas on 1/10/21.  Please call to confirm your appointment time.  Control your home environment in the following ways to help prevent asthma attacks:  Change your heating and air conditioning filter at least once a month.  Do not Informed pt of the various negative side effects of smoking including risk of COPD, Lung Ca etc  Strongly recommended that pt stops smoking and pt given various options of smoking cessasion tools such as NRT's and other pharmacotherapies. Do not stay in places where others are smoking.  Get rid of pests (such as roaches and mice) and their droppings.  If you see mold on a plant, throw it away.  Clean your floors and dust every week. Use unscented cleaning products. Use a vacuum  with a HEPA filter if possible. If vacuuming or cleaning triggers your asthma, try to find someone else to do these chores..  Use allergy-proof pillows, mattress covers, and box spring covers.  Wash bedsheets and blankets every week in hot water and dry in a dryer.  Use a blanket that is made of polyester or cotton with a tight nap.  Clean bathrooms and zachariah with bleach and repaint with mold-resistant paint.   Wash hands frequently.  Talk to your caregiver about an action plan for managing asthma attacks. This includes the use of a peak flow meter which measures the severity of the attack and medicines that can help stop the attack. An action plan can help minimize or stop the attack without having to seek medical care.  Always have a plan prepared for seeking medical attention. This should include contacting your caregiver and in the case of a severe attack, calling your local emergency services        SECONDARY DISCHARGE DIAGNOSES  Diagnosis: HTN (hypertension)  Assessment and Plan of Treatment: Hypertension, also known simply as "high blood pressure" is very common, however can lead to many significant complications if left uncontrolled. When the blood pressure is elevated, the force the blood puts on the walls of the arteries is high and can lead to artery damage. Also, when the heart muscle has to pump blood against a high blood pressure, it thickens and enlarges, just like any muscle does when it has to do more work (think of a weight ). When the blood pressure is very high, people may feel a headache or tired. Some people can feel pounding in their head or have blurry vision. Hearing the heart beating in the ear especially at night can be a sign of high blood pressure. Eventually, symptoms of stroke, heart attack, heart failure or irregular heartbeats can occur  - Exercise: Doing cardiovascular exercise such as running, biking or swimming at least 30 minutes per day most days of the week is recommended to help keep blood pressure healthy  - Lose weight: Maintaining a normal BMI (body mass index) is very important in keeping blood pressure readings normal   - Avoid salt: Sodium in the diet increases the blood pressure in many ways. Salt comes in many foods, so just because you don't add salt to your food it does not mean that you are eating a low salt diet. Read labels and keep sodium intake to less than 2000 mg per day   - Avoid alcohol: Even 1 or 2 alcoholic drinks can significantly increase blood pressure   - DASH Diet: The DASH diet has been shown to reduce blood pressure   - Take all medication as prescribed.   - Follow up with your medical doctor for routine blood pressure monitoring at your next visit.   - Notify your doctor if you have any of the following symptoms:    - Dizziness, Lightheadedness, Blurry vision, Headache, Chest pain, Shortness of breath      Diagnosis: Hypothyroid  Assessment and Plan of Treatment: you do not make enough thyroid hormone  signs & symptoms of low levels of thyroid hormone - tired, getting cold easily, coarse or thin hair, constipation, shortness of breath, swelling, irregular periods  your doctor will do thyroid hormone blood tests at least once a year to monitor if medication dose is adequate  take your thyroid medicine as directed by your doctor & on empty stomach      Diagnosis: Thrombocytopenia  Assessment and Plan of Treatment: HOME CARE INSTRUCTIONS  Check the skin and linings inside your mouth for bruising or bleeding as directed by your caregiver.  Check your sputum, urine, and stool for blood as directed by your caregiver.  Do not return to any activities that could cause bumps or bruises until your caregiver says it is okay.  Take extra care not to cut yourself when shaving or when using scissors, needles, knives, and other tools.  Take extra care not to burn yourself when ironing or cooking.   Ask your caregiver if it is okay for you to drink alcohol.  Only take over-the-counter or prescription medicines as directed by your caregiver.  Notify all your caregivers, including dentists and eye doctors, about your condition.  SEEK IMMEDIATE MEDICAL CARE IF:  You develop active bleeding from anywhere in your body.   You develop unexplained bruising or bleeding.  You have blood in your sputum, urine, or stool       PRINCIPAL DISCHARGE DIAGNOSIS  Diagnosis: SOB (shortness of breath)  Assessment and Plan of Treatment: HOME CARE INSTRUCTIONS  Take inhaled and oral medicine as directed by your caregiver.  You are to follow up with Dr. Borjas on 1/10/22.  Please call to confirm your appointment time.  Control your home environment in the following ways to help prevent asthma attacks:  Change your heating and air conditioning filter at least once a month.  Do not Informed pt of the various negative side effects of smoking including risk of COPD, Lung Ca etc  Strongly recommended that pt stops smoking and pt given various options of smoking cessasion tools such as NRT's and other pharmacotherapies. Do not stay in places where others are smoking.  Get rid of pests (such as roaches and mice) and their droppings.  If you see mold on a plant, throw it away.  Clean your floors and dust every week. Use unscented cleaning products. Use a vacuum  with a HEPA filter if possible. If vacuuming or cleaning triggers your asthma, try to find someone else to do these chores..  Use allergy-proof pillows, mattress covers, and box spring covers.  Wash bedsheets and blankets every week in hot water and dry in a dryer.  Use a blanket that is made of polyester or cotton with a tight nap.  Clean bathrooms and zachariah with bleach and repaint with mold-resistant paint.   Wash hands frequently.  Talk to your caregiver about an action plan for managing asthma attacks. This includes the use of a peak flow meter which measures the severity of the attack and medicines that can help stop the attack. An action plan can help minimize or stop the attack without having to seek medical care.  Always have a plan prepared for seeking medical attention. This should include contacting your caregiver and in the case of a severe attack, calling your local emergency services        SECONDARY DISCHARGE DIAGNOSES  Diagnosis: Acute asthma exacerbation  Assessment and Plan of Treatment: Improved. Continue with medications as prescribed.  Follow up with pulmonology on 1/10/22.    Diagnosis: HTN (hypertension)  Assessment and Plan of Treatment: Hypertension, also known simply as "high blood pressure" is very common, however can lead to many significant complications if left uncontrolled. When the blood pressure is elevated, the force the blood puts on the walls of the arteries is high and can lead to artery damage. Also, when the heart muscle has to pump blood against a high blood pressure, it thickens and enlarges, just like any muscle does when it has to do more work (think of a weight ). When the blood pressure is very high, people may feel a headache or tired. Some people can feel pounding in their head or have blurry vision. Hearing the heart beating in the ear especially at night can be a sign of high blood pressure. Eventually, symptoms of stroke, heart attack, heart failure or irregular heartbeats can occur  - Exercise: Doing cardiovascular exercise such as running, biking or swimming at least 30 minutes per day most days of the week is recommended to help keep blood pressure healthy  - Lose weight: Maintaining a normal BMI (body mass index) is very important in keeping blood pressure readings normal   - Avoid salt: Sodium in the diet increases the blood pressure in many ways. Salt comes in many foods, so just because you don't add salt to your food it does not mean that you are eating a low salt diet. Read labels and keep sodium intake to less than 2000 mg per day   - Avoid alcohol: Even 1 or 2 alcoholic drinks can significantly increase blood pressure   - DASH Diet: The DASH diet has been shown to reduce blood pressure   - Take all medication as prescribed.   - Follow up with your medical doctor for routine blood pressure monitoring at your next visit.   - Notify your doctor if you have any of the following symptoms:    - Dizziness, Lightheadedness, Blurry vision, Headache, Chest pain, Shortness of breath      Diagnosis: Hypothyroid  Assessment and Plan of Treatment: you do not make enough thyroid hormone  signs & symptoms of low levels of thyroid hormone - tired, getting cold easily, coarse or thin hair, constipation, shortness of breath, swelling, irregular periods  your doctor will do thyroid hormone blood tests at least once a year to monitor if medication dose is adequate  take your thyroid medicine as directed by your doctor & on empty stomach      Diagnosis: Thrombocytopenia  Assessment and Plan of Treatment: HOME CARE INSTRUCTIONS  Check the skin and linings inside your mouth for bruising or bleeding as directed by your caregiver.  Check your sputum, urine, and stool for blood as directed by your caregiver.  Do not return to any activities that could cause bumps or bruises until your caregiver says it is okay.  Take extra care not to cut yourself when shaving or when using scissors, needles, knives, and other tools.  Take extra care not to burn yourself when ironing or cooking.   Ask your caregiver if it is okay for you to drink alcohol.  Only take over-the-counter or prescription medicines as directed by your caregiver.  Notify all your caregivers, including dentists and eye doctors, about your condition.  SEEK IMMEDIATE MEDICAL CARE IF:  You develop active bleeding from anywhere in your body.   You develop unexplained bruising or bleeding.  You have blood in your sputum, urine, or stool

## 2021-12-29 NOTE — PROGRESS NOTE ADULT - PROBLEM SELECTOR PLAN 1
Acute onset dyspnea, now improved with steroids ?reactive airway disease  -RVP/COVID neg  -CXR clear  -CT chest in October was normal  -f/u LE duplex  -CE normal  -Duoneb q6  -Symbicort BID  -c/w Singulair 10mg qd  -c/w Prednisone 40mg qd, decrease to 20mg qd tomorrow  -Please check sp02 with ambulation   -Likely discharge tomorrow  -Will need outpt PFTs to r/o obstructive lung disease Acute onset dyspnea, now improved with steroids ?reactive airway disease. Will need further outpt testing  -RVP/COVID neg  -CXR clear  -CT chest in October was normal  -LE duplex negative  -CE normal  -Duoneb q6  -Symbicort BID  -Add Spiriva qd  -c/w Singulair 10mg qd  -d/c Claritin   -Prednisone taper: 30mg qd x3 days, then 20mg qd x3 days then 10mg qd until she follows up with Dr. Mabry on 1/17  -Please check sp02 with ambulation   -Likely discharge tomorrow  -Will need outpt PFTs to r/o obstructive lung disease

## 2021-12-29 NOTE — DISCHARGE NOTE PROVIDER - NSDCMRMEDTOKEN_GEN_ALL_CORE_FT
Albuterol (Eqv-Proventil HFA) 90 mcg/inh inhalation aerosol: 2 puff(s) inhaled every 6 hours, As Needed for SOB or wheezing  budesonide-formoterol 160 mcg-4.5 mcg/inh inhalation aerosol: 2 puff(s) inhaled 2 times a day  Coreg 6.25 mg oral tablet: 1 tab(s) orally 2 times a day  dorzolamide-timolol 2%-0.5% preservative-free ophthalmic solution: 1 drop(s) to each affected eye 2 times a day  Flonase 50 mcg/inh nasal spray: 1 spray(s) nasal once a day, As Needed  latanoprost 0.005% ophthalmic solution: 1 drop(s) in the right eye once a day (in the evening)  levothyroxine 50 mcg (0.05 mg) oral tablet: 1 tab(s) orally once a day  Lipitor 40 mg oral tablet: 1 tab(s) orally once a day  melatonin 5 mg oral tablet: 1 tab(s) orally once a day (at bedtime)  montelukast 10 mg oral tablet: 1 tab(s) orally once a day  Multiple Vitamins oral tablet: 1 tab(s) orally once a day  pantoprazole 40 mg oral delayed release tablet: 1 tab(s) orally once a day (before a meal)  predniSONE 10 mg oral tablet: 1 tab(s) orally once a day x3 days, then  1/2 tab orally once a day x3 days and then stop  Saline Nasal Mist: 1 spray(s) nasal once a day (at bedtime)  senna oral tablet: 2 tab(s) orally once a day (at bedtime)  thiamine: 1 tab(s) orally once a day  Vitamin C: 1 tab(s) orally once a day  Vitamin D3: 1 tab(s) orally once a day  vitamin E: 1 cap(s) orally once a day   Albuterol (Eqv-Proventil HFA) 90 mcg/inh inhalation aerosol: 2 puff(s) inhaled every 6 hours, As Needed for SOB or wheezing  budesonide-formoterol 160 mcg-4.5 mcg/inh inhalation aerosol: 2 puff(s) inhaled 2 times a day  Coreg 6.25 mg oral tablet: 1 tab(s) orally 2 times a day  dorzolamide-timolol 2%-0.5% preservative-free ophthalmic solution: 1 drop(s) to each affected eye 2 times a day  Flonase 50 mcg/inh nasal spray: 1 spray(s) nasal once a day, As Needed  ipratropium-albuterol 0.5 mg-2.5 mg/3 mL inhalation solution: 3 milliliter(s) inhaled every 6 hours  latanoprost 0.005% ophthalmic solution: 1 drop(s) in the right eye once a day (in the evening)  levothyroxine 50 mcg (0.05 mg) oral tablet: 1 tab(s) orally once a day  Lipitor 40 mg oral tablet: 1 tab(s) orally once a day  melatonin 5 mg oral tablet: 1 tab(s) orally once a day (at bedtime)  montelukast 10 mg oral tablet: 1 tab(s) orally once a day  Multiple Vitamins oral tablet: 1 tab(s) orally once a day  Nebulizer machine: Use as directed  pantoprazole 40 mg oral delayed release tablet: 1 tab(s) orally once a day (before a meal)  predniSONE 10 mg oral tablet: Take 3 tabs PO x 2 days; then  Take 2 tabs PO x 3 days; then  Take 1 tab PO x 20 days then stop  Saline Nasal Mist: 1 spray(s) nasal once a day (at bedtime)  senna oral tablet: 2 tab(s) orally once a day (at bedtime)  thiamine: 1 tab(s) orally once a day  tiotropium 18 mcg inhalation capsule: 1 cap(s) inhaled once a day  Vitamin C: 1 tab(s) orally once a day  Vitamin D3: 1 tab(s) orally once a day  vitamin E: 1 cap(s) orally once a day

## 2021-12-29 NOTE — DISCHARGE NOTE PROVIDER - CARE PROVIDER_API CALL
Jett Mabry (MD)  Critical Care Medicine  [address] [address]  Phone: [phone]  Fax: [phone]  Follow Up Time: 1 week   Jett Mabry (MD)  Critical Care Medicine  [address] [facility], [address]  [place] [address]  Phone: [phone]  Fax: [phone]  Scheduled Appointment: 01/10/2021    Medical Doctor, Primary  Phone: (   )    -  Fax: (   )    -  Follow Up Time: 1 week

## 2021-12-30 ENCOUNTER — TRANSCRIPTION ENCOUNTER (OUTPATIENT)
Age: 81
End: 2021-12-30

## 2021-12-30 VITALS
HEART RATE: 78 BPM | DIASTOLIC BLOOD PRESSURE: 77 MMHG | OXYGEN SATURATION: 96 % | RESPIRATION RATE: 16 BRPM | SYSTOLIC BLOOD PRESSURE: 135 MMHG | TEMPERATURE: 98 F

## 2021-12-30 LAB
ANION GAP SERPL CALC-SCNC: 12 MMOL/L — SIGNIFICANT CHANGE UP (ref 5–17)
BUN SERPL-MCNC: 21 MG/DL — SIGNIFICANT CHANGE UP (ref 7–23)
CALCIUM SERPL-MCNC: 9 MG/DL — SIGNIFICANT CHANGE UP (ref 8.4–10.5)
CHLORIDE SERPL-SCNC: 100 MMOL/L — SIGNIFICANT CHANGE UP (ref 96–108)
CO2 SERPL-SCNC: 22 MMOL/L — SIGNIFICANT CHANGE UP (ref 22–31)
CREAT SERPL-MCNC: 0.96 MG/DL — SIGNIFICANT CHANGE UP (ref 0.5–1.3)
GLUCOSE SERPL-MCNC: 100 MG/DL — HIGH (ref 70–99)
HCT VFR BLD CALC: 40.8 % — SIGNIFICANT CHANGE UP (ref 34.5–45)
HGB BLD-MCNC: 13.9 G/DL — SIGNIFICANT CHANGE UP (ref 11.5–15.5)
MCHC RBC-ENTMCNC: 31.4 PG — SIGNIFICANT CHANGE UP (ref 27–34)
MCHC RBC-ENTMCNC: 34.1 GM/DL — SIGNIFICANT CHANGE UP (ref 32–36)
MCV RBC AUTO: 92.1 FL — SIGNIFICANT CHANGE UP (ref 80–100)
NRBC # BLD: 0 /100 WBCS — SIGNIFICANT CHANGE UP (ref 0–0)
PLATELET # BLD AUTO: 75 K/UL — LOW (ref 150–400)
POTASSIUM SERPL-MCNC: 4 MMOL/L — SIGNIFICANT CHANGE UP (ref 3.5–5.3)
POTASSIUM SERPL-SCNC: 4 MMOL/L — SIGNIFICANT CHANGE UP (ref 3.5–5.3)
RBC # BLD: 4.43 M/UL — SIGNIFICANT CHANGE UP (ref 3.8–5.2)
RBC # FLD: 12.6 % — SIGNIFICANT CHANGE UP (ref 10.3–14.5)
SODIUM SERPL-SCNC: 134 MMOL/L — LOW (ref 135–145)
WBC # BLD: 10.61 K/UL — HIGH (ref 3.8–10.5)
WBC # FLD AUTO: 10.61 K/UL — HIGH (ref 3.8–10.5)

## 2021-12-30 PROCEDURE — 82330 ASSAY OF CALCIUM: CPT

## 2021-12-30 PROCEDURE — 85027 COMPLETE CBC AUTOMATED: CPT

## 2021-12-30 PROCEDURE — 80048 BASIC METABOLIC PNL TOTAL CA: CPT

## 2021-12-30 PROCEDURE — 82803 BLOOD GASES ANY COMBINATION: CPT

## 2021-12-30 PROCEDURE — 71045 X-RAY EXAM CHEST 1 VIEW: CPT

## 2021-12-30 PROCEDURE — 94640 AIRWAY INHALATION TREATMENT: CPT

## 2021-12-30 PROCEDURE — 82435 ASSAY OF BLOOD CHLORIDE: CPT

## 2021-12-30 PROCEDURE — 82553 CREATINE MB FRACTION: CPT

## 2021-12-30 PROCEDURE — 83605 ASSAY OF LACTIC ACID: CPT

## 2021-12-30 PROCEDURE — 85045 AUTOMATED RETICULOCYTE COUNT: CPT

## 2021-12-30 PROCEDURE — 83880 ASSAY OF NATRIURETIC PEPTIDE: CPT

## 2021-12-30 PROCEDURE — 85018 HEMOGLOBIN: CPT

## 2021-12-30 PROCEDURE — 85014 HEMATOCRIT: CPT

## 2021-12-30 PROCEDURE — 82947 ASSAY GLUCOSE BLOOD QUANT: CPT

## 2021-12-30 PROCEDURE — 93005 ELECTROCARDIOGRAM TRACING: CPT

## 2021-12-30 PROCEDURE — 99285 EMERGENCY DEPT VISIT HI MDM: CPT | Mod: 25

## 2021-12-30 PROCEDURE — 84295 ASSAY OF SERUM SODIUM: CPT

## 2021-12-30 PROCEDURE — 96365 THER/PROPH/DIAG IV INF INIT: CPT

## 2021-12-30 PROCEDURE — 93970 EXTREMITY STUDY: CPT

## 2021-12-30 PROCEDURE — 80053 COMPREHEN METABOLIC PANEL: CPT

## 2021-12-30 PROCEDURE — 0225U NFCT DS DNA&RNA 21 SARSCOV2: CPT

## 2021-12-30 PROCEDURE — 84132 ASSAY OF SERUM POTASSIUM: CPT

## 2021-12-30 PROCEDURE — 85025 COMPLETE CBC W/AUTO DIFF WBC: CPT

## 2021-12-30 PROCEDURE — 84484 ASSAY OF TROPONIN QUANT: CPT

## 2021-12-30 RX ORDER — IPRATROPIUM/ALBUTEROL SULFATE 18-103MCG
3 AEROSOL WITH ADAPTER (GRAM) INHALATION
Qty: 360 | Refills: 0
Start: 2021-12-30 | End: 2022-01-28

## 2021-12-30 RX ORDER — BUDESONIDE AND FORMOTEROL FUMARATE DIHYDRATE 160; 4.5 UG/1; UG/1
2 AEROSOL RESPIRATORY (INHALATION)
Qty: 1 | Refills: 0
Start: 2021-12-30 | End: 2022-01-28

## 2021-12-30 RX ORDER — TIOTROPIUM BROMIDE 18 UG/1
1 CAPSULE ORAL; RESPIRATORY (INHALATION)
Qty: 30 | Refills: 0
Start: 2021-12-30 | End: 2022-01-28

## 2021-12-30 RX ADMIN — PANTOPRAZOLE SODIUM 40 MILLIGRAM(S): 20 TABLET, DELAYED RELEASE ORAL at 05:30

## 2021-12-30 RX ADMIN — CARVEDILOL PHOSPHATE 6.25 MILLIGRAM(S): 80 CAPSULE, EXTENDED RELEASE ORAL at 05:30

## 2021-12-30 RX ADMIN — MONTELUKAST 10 MILLIGRAM(S): 4 TABLET, CHEWABLE ORAL at 11:16

## 2021-12-30 RX ADMIN — TIOTROPIUM BROMIDE 1 CAPSULE(S): 18 CAPSULE ORAL; RESPIRATORY (INHALATION) at 11:16

## 2021-12-30 RX ADMIN — Medication 30 MILLIGRAM(S): at 05:31

## 2021-12-30 RX ADMIN — Medication 3 MILLILITER(S): at 11:15

## 2021-12-30 RX ADMIN — BUDESONIDE AND FORMOTEROL FUMARATE DIHYDRATE 2 PUFF(S): 160; 4.5 AEROSOL RESPIRATORY (INHALATION) at 05:32

## 2021-12-30 RX ADMIN — DORZOLAMIDE HYDROCHLORIDE TIMOLOL MALEATE 1 DROP(S): 20; 5 SOLUTION/ DROPS OPHTHALMIC at 05:31

## 2021-12-30 RX ADMIN — Medication 50 MICROGRAM(S): at 05:30

## 2021-12-30 NOTE — DISCHARGE NOTE NURSING/CASE MANAGEMENT/SOCIAL WORK - NSDCPEFALRISK_GEN_ALL_CORE
For information on Fall & Injury Prevention, visit: [email or web address] OR  [email or web address] OR  https://www.cdc.gov/steadi/patient.html

## 2021-12-30 NOTE — PROGRESS NOTE ADULT - PROBLEM SELECTOR PLAN 2
continue coreg  will continue to monitor BP and adjust meds as needed  may need to switch off B Blocker with hx of asthma
Consider heme eval as outpt r/o ITP
Consider heme eval as outpt r/o ITP
continue coreg  will continue to monitor BP and adjust meds as needed  may need to switch off B Blocker with hx of asthma
continue coreg  will continue to monitor BP and adjust meds as needed  may need to switch off B Blocker with hx of asthma

## 2021-12-30 NOTE — DISCHARGE NOTE NURSING/CASE MANAGEMENT/SOCIAL WORK - PATIENT PORTAL LINK FT
You can access the FollowMyHealth Patient Portal offered by [facility] by registering at the following website: [email or web address]. By joining [facility]’s FollowMyHealth portal, you will also be able to view your health information using other applications (apps) compatible with our system.

## 2021-12-30 NOTE — PROGRESS NOTE ADULT - REASON FOR ADMISSION
shortness of breath and wheezing

## 2021-12-30 NOTE — PROGRESS NOTE ADULT - TIME BILLING
Discussed treatment plan with patient at bedside.  tentative planning for DC in am
DC Patient home  activity as tolerated  PO as tolerated   follow up with Dr sorensen and Dr roberts  continue current meds  Patient aware of plan
Discussed treatment plan with patient at bedside.

## 2021-12-30 NOTE — PROGRESS NOTE ADULT - PROBLEM SELECTOR PLAN 5
Patient with a very slight bump in creatinine  will start gentle hydration  follow creatinine
Patient with a very slight bump in creatinine  will start gentle hydration  follow creatinine

## 2021-12-30 NOTE — PROGRESS NOTE ADULT - PROBLEM SELECTOR PLAN 3
continue current dose of levothyroxine  will check TSH

## 2021-12-30 NOTE — PROGRESS NOTE ADULT - PROBLEM SELECTOR PLAN 1
Acute onset dyspnea, now improved with steroids ?reactive airway disease. Will need further outpt testing  -RVP/COVID neg  -CXR clear  -CT chest in October was normal  -LE duplex negative  -CE normal  -Duoneb q8h on discharge   -Symbicort BID  -Spiriva qd  -c/w Singulair 10mg qd  -d/c Claritin   -Normoxic at rest and with ambulation   -Prednisone taper: 30mg qd x2 more days, then 20mg qd x3 days then decrease to 10mg qd and continue until she follows up with Dr. Mabry on 1/17  -Will need outpt PFTs to r/o obstructive lung disease  -D/c planning Acute onset dyspnea, now improved with steroids ?reactive airway disease. Will need further outpt testing  -RVP/COVID neg  -CXR clear  -CT chest in October was normal  -LE duplex negative  -CE normal  -Duoneb q8h on discharge   -Symbicort BID  -Spiriva qd  -c/w Singulair 10mg qd  -d/c Claritin   -Normoxic at rest and with ambulation   -Prednisone taper: 30mg qd x2 more days, then 20mg qd x3 days then decrease to 10mg qd and continue until she follows up with Dr. Mabry on 1/10  -Will need outpt PFTs to r/o obstructive lung disease  -D/c planning

## 2021-12-30 NOTE — PROGRESS NOTE ADULT - SUBJECTIVE AND OBJECTIVE BOX
82 yo F pmhx asthma htn hld recent admission for SOB 10/21 p/w SOB. Pt started having difficulty breathing last night with worsening this am. Tried albuterol with no relief. pt BIBEMS was sating low 90s put on O2 and given duoneb x2 and 125 solumedrol. On arrival diffuse wheezes throughout sating 97 on 4 L tachypneic . Pt says episode similar to previous episode. Denies f/c CP cough. Denies sick contacts. vaccinated for flu and covid. Pt was started on nebs and solumedrol with Good results. Patient seen now in the ED AAOx3, O2 sats were 97% on NC. Patient denies any new wheezing or shortness of breath. Patient has been stable off O2    MEDICATIONS  (STANDING):  albuterol/ipratropium for Nebulization 3 milliLiter(s) Nebulizer every 6 hours  atorvastatin 40 milliGRAM(s) Oral at bedtime  budesonide 160 MICROgram(s)/formoterol 4.5 MICROgram(s) Inhaler 2 Puff(s) Inhalation two times a day  carvedilol 6.25 milliGRAM(s) Oral every 12 hours  dorzolamide 2%/timolol 0.5% Ophthalmic Solution 1 Drop(s) Both EYES two times a day  levothyroxine 50 MICROGram(s) Oral daily  loratadine 10 milliGRAM(s) Oral daily  montelukast 10 milliGRAM(s) Oral daily  pantoprazole    Tablet 40 milliGRAM(s) Oral before breakfast  predniSONE   Tablet 40 milliGRAM(s) Oral daily  senna 2 Tablet(s) Oral at bedtime  sodium chloride 0.9%. 1000 milliLiter(s) (50 mL/Hr) IV Continuous <Continuous>    MEDICATIONS  (PRN):  benzocaine 15 mG/menthol 3.6 mG (Sugar-Free) Lozenge 1 Lozenge Oral every 6 hours PRN Sore Throat  fluticasone propionate 50 MICROgram(s)/spray Nasal Spray 1 Spray(s) Both Nostrils two times a day PRN nasal congestion  melatonin 5 milliGRAM(s) Oral at bedtime PRN Insomnia  sodium chloride 0.65% Nasal 1 Spray(s) Alternating Nostrils every 4 hours PRN Congestion          VITALS:   T(C): 36.8 (12-29-21 @ 13:13), Max: 36.8 (12-28-21 @ 22:27)  HR: 97 (12-29-21 @ 13:13) (70 - 97)  BP: 150/76 (12-29-21 @ 13:13) (128/75 - 150/76)  RR: 18 (12-29-21 @ 13:13) (18 - 18)  SpO2: 93% (12-29-21 @ 13:13) (92% - 94%)  Wt(kg): --    PHYSICAL EXAM:  GENERAL: NAD, well nourished and conversant  HEAD:  Atraumatic  EYES: EOM, PERRLA, conjunctiva pink and sclera white  ENT: No tonsillar erythema, exudates, or enlargement, moist mucous membranes, good dentition, no lesions  NECK: Supple, No JVD, normal thyroid, carotids with normal upstrokes and no bruits  CHEST/LUNG: Clear to auscultation bilaterally, No rales, rhonchi, wheezing, or rubs  HEART: Regular rate and rhythm, No murmurs, rubs, or gallops  ABDOMEN: Soft, nondistended, no masses, guarding, tenderness or rebound, bowel sounds present  EXTREMITIES:  2+ Peripheral Pulses, No clubbing, cyanosis, or edema.   LYMPH: No lymphadenopathy noted  SKIN: No rashes or lesions  NERVOUS SYSTEM:  Alert & Oriented X3, normal cognitive function. Motor Strength 5/5 right upper and right lower.  5/5 left upper and left lower extremities, DTRs 2+ intact and symmetric    LABS:    CARDIAC MARKERS ( 28 Dec 2021 14:57 )  x     / x     / x     / x     / 2.3 ng/mL      CBC Full  -  ( 29 Dec 2021 06:39 )  WBC Count : 10.52 K/uL  RBC Count : 4.22 M/uL  Hemoglobin : 13.4 g/dL  Hematocrit : 40.1 %  Platelet Count - Automated : 79 K/uL  Mean Cell Volume : 95.0 fl  Mean Cell Hemoglobin : 31.8 pg  Mean Cell Hemoglobin Concentration : 33.4 gm/dL  Auto Neutrophil # : x  Auto Lymphocyte # : x  Auto Monocyte # : x  Auto Eosinophil # : x  Auto Basophil # : x  Auto Neutrophil % : x  Auto Lymphocyte % : x  Auto Monocyte % : x  Auto Eosinophil % : x  Auto Basophil % : x    12-29    137  |  101  |  24<H>  ----------------------------<  98  4.3   |  22  |  1.00    Ca    9.3      29 Dec 2021 06:51            CAPILLARY BLOOD GLUCOSE          RADIOLOGY & ADDITIONAL TESTS:      
Follow-up Pulm Progress Note    Feeling much better  Denies cough/sob/CP/wheezing     Medications:  MEDICATIONS  (STANDING):  albuterol/ipratropium for Nebulization 3 milliLiter(s) Nebulizer every 6 hours  atorvastatin 40 milliGRAM(s) Oral at bedtime  budesonide 160 MICROgram(s)/formoterol 4.5 MICROgram(s) Inhaler 2 Puff(s) Inhalation two times a day  carvedilol 6.25 milliGRAM(s) Oral every 12 hours  dorzolamide 2%/timolol 0.5% Ophthalmic Solution 1 Drop(s) Both EYES two times a day  levothyroxine 50 MICROGram(s) Oral daily  montelukast 10 milliGRAM(s) Oral daily  pantoprazole    Tablet 40 milliGRAM(s) Oral before breakfast  predniSONE   Tablet 30 milliGRAM(s) Oral daily  senna 2 Tablet(s) Oral at bedtime  sodium chloride 0.9%. 1000 milliLiter(s) (50 mL/Hr) IV Continuous <Continuous>  tiotropium 18 MICROgram(s) Capsule 1 Capsule(s) Inhalation daily    MEDICATIONS  (PRN):  benzocaine 15 mG/menthol 3.6 mG (Sugar-Free) Lozenge 1 Lozenge Oral every 6 hours PRN Sore Throat  fluticasone propionate 50 MICROgram(s)/spray Nasal Spray 1 Spray(s) Both Nostrils two times a day PRN nasal congestion  melatonin 5 milliGRAM(s) Oral at bedtime PRN Insomnia  sodium chloride 0.65% Nasal 1 Spray(s) Alternating Nostrils every 4 hours PRN Congestion          Vital Signs Last 24 Hrs  T(C): 36.8 (30 Dec 2021 09:15), Max: 37.1 (30 Dec 2021 02:08)  T(F): 98.2 (30 Dec 2021 09:15), Max: 98.7 (30 Dec 2021 02:08)  HR: 78 (30 Dec 2021 09:15) (78 - 118)  BP: 160/83 (30 Dec 2021 09:15) (134/76 - 164/85)  BP(mean): --  RR: 18 (30 Dec 2021 09:15) (18 - 22)  SpO2: 94% (30 Dec 2021 09:15) (92% - 94%)      VBG pH 7.48 12-28 @ 14:59    VBG pCO2 32 12-28 @ 14:59    VBG O2 sat 97.0 12-28 @ 14:59    VBG lactate 2.3 12-28 @ 14:59      12-29 @ 07:01  -  12-30 @ 07:00  --------------------------------------------------------  IN: 1740 mL / OUT: 300 mL / NET: 1440 mL          LABS:                        13.9   10.61 )-----------( 75       ( 30 Dec 2021 06:48 )             40.8     12-30    134<L>  |  100  |  21  ----------------------------<  100<H>  4.0   |  22  |  0.96    Ca    9.0      30 Dec 2021 06:46        CARDIAC MARKERS ( 28 Dec 2021 14:57 )  x     / x     / x     / x     / 2.3 ng/mL      CAPILLARY BLOOD GLUCOSE              Serum Pro-Brain Natriuretic Peptide: 65 pg/mL (12-28-21 @ 14:57)            Physical Examination:  PULM: minimal forced end exp wheeze bilaterally   CVS: RRR    RADIOLOGY REVIEWED  CXR: Clear lungs  
Follow-up Pulm Progress Note    No new respiratory events overnight.  93% on RA at rest  Trace forced end exp wheeze    Medications:  MEDICATIONS  (STANDING):  albuterol/ipratropium for Nebulization 3 milliLiter(s) Nebulizer every 6 hours  atorvastatin 40 milliGRAM(s) Oral at bedtime  budesonide 160 MICROgram(s)/formoterol 4.5 MICROgram(s) Inhaler 2 Puff(s) Inhalation two times a day  carvedilol 6.25 milliGRAM(s) Oral every 12 hours  dorzolamide 2%/timolol 0.5% Ophthalmic Solution 1 Drop(s) Both EYES two times a day  levothyroxine 50 MICROGram(s) Oral daily  loratadine 10 milliGRAM(s) Oral daily  montelukast 10 milliGRAM(s) Oral daily  pantoprazole    Tablet 40 milliGRAM(s) Oral before breakfast  predniSONE   Tablet 40 milliGRAM(s) Oral daily  senna 2 Tablet(s) Oral at bedtime    MEDICATIONS  (PRN):  benzocaine 15 mG/menthol 3.6 mG (Sugar-Free) Lozenge 1 Lozenge Oral every 6 hours PRN Sore Throat  fluticasone propionate 50 MICROgram(s)/spray Nasal Spray 1 Spray(s) Both Nostrils two times a day PRN nasal congestion  melatonin 5 milliGRAM(s) Oral at bedtime PRN Insomnia  sodium chloride 0.65% Nasal 1 Spray(s) Alternating Nostrils every 4 hours PRN Congestion          Vital Signs Last 24 Hrs  T(C): 36.6 (29 Dec 2021 10:14), Max: 36.8 (28 Dec 2021 14:04)  T(F): 97.9 (29 Dec 2021 10:14), Max: 98.2 (28 Dec 2021 14:04)  HR: 85 (29 Dec 2021 10:14) (70 - 97)  BP: 138/80 (29 Dec 2021 10:14) (128/75 - 147/73)  BP(mean): --  RR: 18 (29 Dec 2021 10:14) (18 - 18)  SpO2: 93% (29 Dec 2021 10:14) (92% - 94%) on RA      VBG pH 7.48 12-28 @ 14:59    VBG pCO2 32 12-28 @ 14:59    VBG O2 sat 97.0 12-28 @ 14:59    VBG lactate 2.3 12-28 @ 14:59  VBG pH 7.28 12-27 @ 12:26    VBG pCO2 57 12-27 @ 12:26    VBG O2 sat 87.8 12-27 @ 12:26    VBG lactate 1.6 12-27 @ 12:26      12-28 @ 07:01  -  12-29 @ 07:00  --------------------------------------------------------  IN: 1060 mL / OUT: 0 mL / NET: 1060 mL          LABS:                        13.4   10.52 )-----------( 79       ( 29 Dec 2021 06:39 )             40.1     12-29    137  |  101  |  24<H>  ----------------------------<  98  4.3   |  22  |  1.00    Ca    9.3      29 Dec 2021 06:51    TPro  7.5  /  Alb  4.4  /  TBili  0.5  /  DBili  x   /  AST  79<H>  /  ALT  <5<L>  /  AlkPhos  59  12-27      CARDIAC MARKERS ( 28 Dec 2021 14:57 )  x     / x     / x     / x     / 2.3 ng/mL      CAPILLARY BLOOD GLUCOSE              Serum Pro-Brain Natriuretic Peptide: 65 pg/mL (12-28-21 @ 14:57)                CULTURES: (if applicable)  COVID neg  RVP neg        Physical Examination:  PULM: Forced end exp wheeze bilaterally   CVS: RRR    RADIOLOGY REVIEWED  CXR: Clear lungs  
80 yo F pmhx asthma htn hld recent admission for SOB 10/21 p/w SOB. Pt started having difficulty breathing last night with worsening this am. Tried albuterol with no relief. pt BIBEMS was sating low 90s put on O2 and given duoneb x2 and 125 solumedrol. On arrival diffuse wheezes throughout sating 97 on 4 L tachypneic . Pt says episode similar to previous episode. Denies f/c CP cough. Denies sick contacts. vaccinated for flu and covid. Pt was started on nebs and solumedrol with Good results. Patient seen now in the ED AAOx3, O2 sats were 97% on NC. Patient denies any new wheezing or shortness of breath. Patient has been stable off O2    MEDICATIONS  (STANDING):  albuterol/ipratropium for Nebulization 3 milliLiter(s) Nebulizer every 6 hours  atorvastatin 40 milliGRAM(s) Oral at bedtime  budesonide 160 MICROgram(s)/formoterol 4.5 MICROgram(s) Inhaler 2 Puff(s) Inhalation two times a day  carvedilol 6.25 milliGRAM(s) Oral every 12 hours  dorzolamide 2%/timolol 0.5% Ophthalmic Solution 1 Drop(s) Both EYES two times a day  levothyroxine 50 MICROGram(s) Oral daily  loratadine 10 milliGRAM(s) Oral daily  montelukast 10 milliGRAM(s) Oral daily  pantoprazole    Tablet 40 milliGRAM(s) Oral before breakfast  predniSONE   Tablet 40 milliGRAM(s) Oral daily  senna 2 Tablet(s) Oral at bedtime  sodium chloride 0.9%. 1000 milliLiter(s) (50 mL/Hr) IV Continuous <Continuous>    MEDICATIONS  (PRN):  benzocaine 15 mG/menthol 3.6 mG (Sugar-Free) Lozenge 1 Lozenge Oral every 6 hours PRN Sore Throat  fluticasone propionate 50 MICROgram(s)/spray Nasal Spray 1 Spray(s) Both Nostrils two times a day PRN nasal congestion  melatonin 5 milliGRAM(s) Oral at bedtime PRN Insomnia  sodium chloride 0.65% Nasal 1 Spray(s) Alternating Nostrils every 4 hours PRN Congestion          VITALS:   T(C): 36.8 (12-29-21 @ 13:13), Max: 36.8 (12-28-21 @ 22:27)  HR: 97 (12-29-21 @ 13:13) (70 - 97)  BP: 150/76 (12-29-21 @ 13:13) (128/75 - 150/76)  RR: 18 (12-29-21 @ 13:13) (18 - 18)  SpO2: 93% (12-29-21 @ 13:13) (92% - 94%)  Wt(kg): --    PHYSICAL EXAM:  GENERAL: NAD, well nourished and conversant  HEAD:  Atraumatic  EYES: EOM, PERRLA, conjunctiva pink and sclera white  ENT: No tonsillar erythema, exudates, or enlargement, moist mucous membranes, good dentition, no lesions  NECK: Supple, No JVD, normal thyroid, carotids with normal upstrokes and no bruits  CHEST/LUNG: Clear to auscultation bilaterally, No rales, rhonchi, wheezing, or rubs  HEART: Regular rate and rhythm, No murmurs, rubs, or gallops  ABDOMEN: Soft, nondistended, no masses, guarding, tenderness or rebound, bowel sounds present  EXTREMITIES:  2+ Peripheral Pulses, No clubbing, cyanosis, or edema.   LYMPH: No lymphadenopathy noted  SKIN: No rashes or lesions  NERVOUS SYSTEM:  Alert & Oriented X3, normal cognitive function. Motor Strength 5/5 right upper and right lower.  5/5 left upper and left lower extremities, DTRs 2+ intact and symmetric    LABS:    CARDIAC MARKERS ( 28 Dec 2021 14:57 )  x     / x     / x     / x     / 2.3 ng/mL      CBC Full  -  ( 29 Dec 2021 06:39 )  WBC Count : 10.52 K/uL  RBC Count : 4.22 M/uL  Hemoglobin : 13.4 g/dL  Hematocrit : 40.1 %  Platelet Count - Automated : 79 K/uL  Mean Cell Volume : 95.0 fl  Mean Cell Hemoglobin : 31.8 pg  Mean Cell Hemoglobin Concentration : 33.4 gm/dL  Auto Neutrophil # : x  Auto Lymphocyte # : x  Auto Monocyte # : x  Auto Eosinophil # : x  Auto Basophil # : x  Auto Neutrophil % : x  Auto Lymphocyte % : x  Auto Monocyte % : x  Auto Eosinophil % : x  Auto Basophil % : x    12-29    137  |  101  |  24<H>  ----------------------------<  98  4.3   |  22  |  1.00    Ca    9.3      29 Dec 2021 06:51            CAPILLARY BLOOD GLUCOSE          RADIOLOGY & ADDITIONAL TESTS:      
80 yo F pmhx asthma htn hld recent admission for SOB 10/21 p/w SOB. Pt started having difficulty breathing last night with worsening this am. Tried albuterol with no relief. pt BIBEMS was sating low 90s put on O2 and given duoneb x2 and 125 solumedrol. On arrival diffuse wheezes throughout sating 97 on 4 L tachypneic . Pt says episode similar to previous episode. Denies f/c CP cough. Denies sick contacts. vaccinated for flu and covid. Pt was started on nebs and solumedrol with Good results. Patient seen now in the ED AAOx3, O2 sats were 97% on NC. Patient denies any new wheezing or shortness of breath       MEDICATIONS  (STANDING):  albuterol/ipratropium for Nebulization 3 milliLiter(s) Nebulizer every 6 hours  atorvastatin 40 milliGRAM(s) Oral at bedtime  budesonide 160 MICROgram(s)/formoterol 4.5 MICROgram(s) Inhaler 2 Puff(s) Inhalation two times a day  carvedilol 6.25 milliGRAM(s) Oral every 12 hours  dorzolamide 2%/timolol 0.5% Ophthalmic Solution 1 Drop(s) Both EYES two times a day  levothyroxine 50 MICROGram(s) Oral daily  loratadine 10 milliGRAM(s) Oral daily  montelukast 10 milliGRAM(s) Oral daily  pantoprazole    Tablet 40 milliGRAM(s) Oral before breakfast  predniSONE   Tablet 40 milliGRAM(s) Oral daily  senna 2 Tablet(s) Oral at bedtime    MEDICATIONS  (PRN):  fluticasone propionate 50 MICROgram(s)/spray Nasal Spray 1 Spray(s) Both Nostrils two times a day PRN nasal congestion  melatonin 5 milliGRAM(s) Oral at bedtime PRN Insomnia  sodium chloride 0.65% Nasal 1 Spray(s) Alternating Nostrils every 4 hours PRN Congestion          VITALS:   T(C): 36.8 (12-28-21 @ 14:04), Max: 36.9 (12-27-21 @ 19:52)  HR: 97 (12-28-21 @ 14:04) (81 - 109)  BP: 147/73 (12-28-21 @ 14:04) (125/73 - 173/99)  RR: 18 (12-28-21 @ 14:04) (18 - 26)  SpO2: 92% (12-28-21 @ 14:04) (92% - 97%)  Wt(kg): --    PHYSICAL EXAM:  GENERAL: NAD, well nourished and conversant  HEAD:  Atraumatic  EYES: EOM, PERRLA, conjunctiva pink and sclera white  ENT: No tonsillar erythema, exudates, or enlargement, moist mucous membranes, good dentition, no lesions  NECK: Supple, No JVD, normal thyroid, carotids with normal upstrokes and no bruits  CHEST/LUNG: Clear to auscultation bilaterally, No rales, rhonchi, wheezing, or rubs  HEART: Regular rate and rhythm, No murmurs, rubs, or gallops  ABDOMEN: Soft, nondistended, no masses, guarding, tenderness or rebound, bowel sounds present  EXTREMITIES:  2+ Peripheral Pulses, No clubbing, cyanosis, or edema.   LYMPH: No lymphadenopathy noted  SKIN: No rashes or lesions  NERVOUS SYSTEM:  Alert & Oriented X3, normal cognitive function. Motor Strength 5/5 right upper and right lower.  5/5 left upper and left lower extremities, DTRs 2+ intact and symmetric    LABS:        CBC Full  -  ( 28 Dec 2021 07:32 )  WBC Count : 10.74 K/uL  RBC Count : 4.42 M/uL  Hemoglobin : 14.0 g/dL  Hematocrit : 40.8 %  Platelet Count - Automated : 92 K/uL  Mean Cell Volume : 92.3 fl  Mean Cell Hemoglobin : 31.7 pg  Mean Cell Hemoglobin Concentration : 34.3 gm/dL  Auto Neutrophil # : x  Auto Lymphocyte # : x  Auto Monocyte # : x  Auto Eosinophil # : x  Auto Basophil # : x  Auto Neutrophil % : x  Auto Lymphocyte % : x  Auto Monocyte % : x  Auto Eosinophil % : x  Auto Basophil % : x    12-28    134<L>  |  99  |  17  ----------------------------<  134<H>  4.2   |  21<L>  |  0.82    Ca    9.2      28 Dec 2021 07:31    TPro  7.5  /  Alb  4.4  /  TBili  0.5  /  DBili  x   /  AST  79<H>  /  ALT  <5<L>  /  AlkPhos  59  12-27    LIVER FUNCTIONS - ( 27 Dec 2021 12:26 )  Alb: 4.4 g/dL / Pro: 7.5 g/dL / ALK PHOS: 59 U/L / ALT: <5 U/L / AST: 79 U/L / GGT: x               CAPILLARY BLOOD GLUCOSE          RADIOLOGY & ADDITIONAL TESTS:

## 2021-12-30 NOTE — PROGRESS NOTE ADULT - ASSESSMENT
80 y/o F with PMH of glaucoma, hypothyroid, HLD, HTN, with recent admission for dyspnea in October 2nd suspected viral etiology now presenting with acute onset dyspnea on 12/26. Patient was awoken out of sleep with severe dyspnea and wheezing that did not resolve with Albuterol x3. She denies feeling ill in the days prior, denies sick contacts. States dyspnea was very acute but she did feel more dyspneic with exertion during the day 12/26 p/w SOB. 
80 yo woman presents with shortness of breath and wheezing. Patient has been hospitalized for asthma in the past. Has followed up with Dr Mabry for pulmonary
82 y/o F with PMH of glaucoma, hypothyroid, HLD, HTN, with recent admission for dyspnea in October 2nd suspected viral etiology now presenting with acute onset dyspnea on 12/26. Patient was awoken out of sleep with severe dyspnea and wheezing that did not resolve with Albuterol x3. She denies feeling ill in the days prior, denies sick contacts. States dyspnea was very acute but she did feel more dyspneic with exertion during the day 12/26 p/w SOB and wheezing. 
82 yo woman presents with shortness of breath and wheezing. Patient has been hospitalized for asthma in the past. Has followed up with Dr Mabry for pulmonary
80 yo woman presents with shortness of breath and wheezing. Patient has been hospitalized for asthma in the past. Has followed up with Dr Mabry for pulmonary

## 2021-12-30 NOTE — PROGRESS NOTE ADULT - PROBLEM SELECTOR PLAN 1
Patient with no new episodes of SOB  will continue nebs  taper steroids   appreciate pulmonary input

## 2022-08-23 NOTE — ASU PATIENT PROFILE, ADULT - CAREGIVER NAME
Occupational Therapy  Visit Type: initial evaluation  Precautions:  Medical precautions:  fall risk; contact precautions and droplet precautions.  COVID +    Pt admitted due to SOB, chest pain, and COVID-19 infection. PMH significant for chronic pain, CAD, CKD stage 3, afib, diabetic neuropathy, DM type 2, and COPD.    Plan for thoracentesis 8/24 per chart review.   Lines:     Basic: PICC, capped IV, O2, telemetry and continuous pulse oximetry (2L O2 nasal cannula)      Lines in chart and on patient reviewed, precautions maintained throughout session.  Hearing: hard of hearing  Safety Measures: bed alarm and chair alarm    SUBJECTIVE  Patient agreed to participate in therapy this date.  Present during session: Family   \"My ankle is hurting, 9/10 pain\". Pt agreeable to therapy evaluation. Family present for entirety of session with pt permission.   Patient / Family Goal: return home and maximize function    OBJECTIVE   Level of consciousness: alert    Oriented to person and time     Disoriented to place and situation    Arousal alertness: appropriate responses to stimuli    Affect/Behavior: pleasant, cooperative and alert  Patient activity tolerance: 1 to 2 activity to rest  Functional Communication/Cognition       Form of communication:  Verbal   Attention span:  Attends with cues to redirect    Commands: follows one step commands with increased time and follows one step commands with repetition.    Transition between tasks: transition with cues.  Hand Dominance: right  Range of Motion (measured in degrees unless otherwise indicated)  WFL: LUE RUE except as noted  Shoulder:   - Flexion (180):      • Left:  110   Right:  110  Strength (out of 5 unless otherwise indicated)  5/5: LUE, RUE  Balance (trials in sec unless otherwise indicated)  Sitting:   - Static:  supervision double upper extremity support, double lower extremity support and back unsupported    - Dynamic:  stand by assist reaches with 2 hands and back  unsupported    Bed mobility:      Supine to sit: contact guard/touching/steadying assist  Training completed:    Tasks: supine to sit    Education details: body mechanics, patient safety and patient requires additional training  Transfers:    Assistive devices: gait belt, 2-wheeled walker and 1 person    Sit to stand: moderate assist    Stand to sit: minimal assist    Training completed:    Tasks: sit to stand, stand to sit and stand pivot    Education details: body mechanics, patient safety and patient requires additional training  Functional Ambulation:    Assistance:minimal assist   Assistive device:2-wheeled walker, 1 person and gait belt    Distance (ft): 5      Education details: body mechanics, patient safety and patient requires additional training  Activities of Daily Living (ADLs):  Lower Body Dressing:     Footwear assistance: moderate assist    Footwear position: edge of bed    Assist needed for: don/doff left sock and don/doff right sock             ASSESSMENT  Impairments: balance, range of motion, activity tolerance, bed mobility, safety awareness, strength and pain  Functional Limitations: bed mobility, functional mobility, toileting, dressing, bathing, functional transfers and IADLs    Patient seen on ICU Nursing Unit.     Patient is a 81-year-old male admitted due to COVID-19 infection, SOB and chest pain. Patient lives with friend in 1 level home with 2 steps for entry. Prior to admission, pt reports was independent/modified independent with ADLs and IADLs. Tub/shower and standard toilet seat in bathroom. Does have shower chair, however states does not use. Reports ambulating without use of a device independently at baseline. States does use 2ww with modified independence with further distance mobility in the community. Denies falls in the past 12 months. Pt drives for community mobility.       Upon evaluation, pt oriented to self and time, disoriented to place and situation. Pt reports 9/10 pain  in R ankle, RN aware. Strength in bilateral upper extremities appears WNLs. ROM in bilateral upper extremities WFLs excepts bilateral shoulder flexion AROM 0-110 degrees. Supine-sit with HOB elevated and use of bed rail with contact guard assist. Increased time for progression of lower extremities, pt reports increased pain in R ankle with transitional movements. Mod assist to don/doff socks seated at EOB. With increased time, pt able to manage socks over toes to doff bilaterally. Mod assist to manage over toes to don socks, reports using sock aid at baseline to complete dressing. Mod assist sit-stand with use of 2ww from EOB. Increased trunk flexion noted upon coming into standing position requiring physical assist for force generation. Increased time to progress bilateral hands to walker. Pt ambulated 5 ft with use of 2ww and min assist from bed-chair. SPO2 96% with activity on 2L O2. Assist required for medical line management with short distance mobility. Seated in recliner with alarm on and needs in reach at end of session. The patient now presents with impairments in activity tolerance, balance, cognitive changes, pain, postural control, safety awareness and strength. MARTY recommended to progress pt towards baseline functioning to maximize independence for safest return to prior living environment. Pt agreeable to MARTY recommendation at this time.        Skilled OT indicated to address the above deficits.     Discharge Recommendations:  Recommendation for Discharge Location: OT WI: Post-acute facility with therapy needs  Recommendation for Discharge Support: OT WI: 24 Hour assist, Assistance with IADLs  PT/OT Mobility Equipment for Discharge: TBD, 2ww?  PT/OT ADL Equipment for Discharge: Has shower chair, reacher, and sock aid  OT Identified Barriers to Discharge: Activity tolerance, strength, impaired mobility and ADLs, medical acuity, pain      • Skilled therapy is required to address these limitations in  attempt to maximize the patient's independence.     • Personal Occupations Profile Affected: bathing/showering, functional mobility/transfers, personal hygiene/grooming, toileting/toilet hygiene, lower body dressing, home establishment/managements     • Clinical decision making: Moderate - Patient has several limitations (3-5), comorbidities and/or complexities, as noted in detailed assessment above, that impact their occupational profile.  Resulting in several treatment options and minimal to moderate task modification consistent with moderate clinical decision making complexity.    Education Provided:   Learning Assessment:  - Primary learner: patient and patient's family  - Are they ready to learn: yes  - Preferred learning style: verbal and demonstration  - Barriers to learning: no barriers apparent at this time  Education provided during session:  - Receiving Education: patient and patient's family  - Results of above outlined education: Needs reinforcement and Verbalizes understanding    Patient at End of Session:   Location: in chair  Safety measures: alarm system in place/re-engaged and call light within reach  Handoff to: nurse and family/caregiver (Family present at bedside at end of session)    PLAN  Suggestions for next session as indicated: Bathing, ADLs seated vs standing pending tolerance, progress transfers/mobility    OT Frequency: 5 days/week  Frequency Comments: GERHARD, 1/5 by 8/30, (8/23)      Interventions: activity tolerance training, ADL retraining, balance, body mechanics, compensatory techniques, patient education, transfer training, bed mobility training, functional transfer training, energy conservation, safety training and therapeutic activity  Agreement to plan and goals: patient agrees with goals and treatment plan      GOALS  Review Date: 8/30/2022  Long Term Goals: (to be met by time of discharge from hospital)  Grooming: Patient will complete grooming tasks independent.  Upper body  dressing: Patient will complete upper body dressing independent.  Lower body dressing: Patient will complete lower body dressing modified independent.  Toileting: Patient will complete toileting modified independent.  Bathing: Patient will complete bathingmodified independent Toilet transfer: Patient will complete toilet transfer with 2-wheeled walker, modified independent.   Home setting transfer: Patient will complete home setting transfers with 2-wheeled walker, modified independent.         Documented in the chart in the following areas: Pain. Assessment. Plan.      Therapy procedure time and total treatment time can be found documented on the Time Entry flowsheet   as above

## 2023-01-05 ENCOUNTER — APPOINTMENT (OUTPATIENT)
Dept: OTOLARYNGOLOGY | Facility: CLINIC | Age: 83
End: 2023-01-05
Payer: MEDICARE

## 2023-01-05 VITALS
HEART RATE: 83 BPM | WEIGHT: 170 LBS | HEIGHT: 62 IN | DIASTOLIC BLOOD PRESSURE: 90 MMHG | SYSTOLIC BLOOD PRESSURE: 177 MMHG | TEMPERATURE: 97.6 F | BODY MASS INDEX: 31.28 KG/M2

## 2023-01-05 DIAGNOSIS — S09.92XA UNSPECIFIED INJURY OF NOSE, INITIAL ENCOUNTER: ICD-10-CM

## 2023-01-05 PROCEDURE — 31231 NASAL ENDOSCOPY DX: CPT

## 2023-01-05 PROCEDURE — 99072 ADDL SUPL MATRL&STAF TM PHE: CPT

## 2023-01-05 PROCEDURE — 99214 OFFICE O/P EST MOD 30 MIN: CPT | Mod: 25

## 2023-01-05 NOTE — HISTORY OF PRESENT ILLNESS
[de-identified] : Patient hit her nose on pavement this morning and had a heavy nosebleed. SHe is concerned that she fractured her nose as she has pain over the nasal bridge and at the nasal tip. She does not have any report of changes in vision or loss of consciousness. Since this happened she has felt nasally congested so is breathing throgh her mouth

## 2023-01-05 NOTE — CONSULT LETTER
[Dear  ___] : Dear  [unfilled], [Consult Letter:] : I had the pleasure of evaluating your patient, [unfilled]. [Please see my note below.] : Please see my note below. [Consult Closing:] : Thank you very much for allowing me to participate in the care of this patient.  If you have any questions, please do not hesitate to contact me. [Sincerely,] : Sincerely, [FreeTextEntry3] : Olvin Kim MD\par [facility]\par Otolaryngology and Facial Plastics\par Associated Professor, [employer]\par

## 2023-01-05 NOTE — END OF VISIT
[FreeTextEntry3] : I, Dr. Kim personally performed the evaluation and management (E/M) services including all necessary procedures, for this new patient. That E/M includes conducting the clinically appropriate initial history &/or exam, assessing all conditions, and establishing the plan of care. Today, my GILBERT, Latricia Mujica, was here to observe &/or participate in the visit & follow plan of care established by me.\par \par \par

## 2023-01-05 NOTE — PHYSICAL EXAM
[Nasal Endoscopy Performed] : nasal endoscopy was performed, see procedure section for findings [Midline] : trachea located in midline position [Normal] : no rashes [de-identified] : mild soft tissue swelling over the nasal bridge [de-identified] : dried blood, crusting aroudn the nostrils

## 2023-01-05 NOTE — ASSESSMENT
[FreeTextEntry1] : Patient seen emergently today while in the doctor's office fell hit her nose and had significant epic ecchymosis on examination nose still appears to be midline there is the beginning of some swelling endoscopically no septal hematoma she was reassured there does not appear to be any need for any intervention I have encouraged her to use ice for the next 24 to 48 hours and warned her that the swelling and the ecchymosis will probably get worse she will follow-up and see us in approximately 2 weeks or 1 week first to reevaluate her.

## 2023-01-12 ENCOUNTER — APPOINTMENT (OUTPATIENT)
Dept: OTOLARYNGOLOGY | Facility: CLINIC | Age: 83
End: 2023-01-12
Payer: MEDICARE

## 2023-01-12 VITALS
HEART RATE: 99 BPM | BODY MASS INDEX: 31.28 KG/M2 | TEMPERATURE: 97.3 F | HEIGHT: 62 IN | SYSTOLIC BLOOD PRESSURE: 157 MMHG | WEIGHT: 170 LBS | DIASTOLIC BLOOD PRESSURE: 84 MMHG

## 2023-01-12 DIAGNOSIS — S09.92XD UNSPECIFIED INJURY OF NOSE, SUBSEQUENT ENCOUNTER: ICD-10-CM

## 2023-01-12 PROCEDURE — 31231 NASAL ENDOSCOPY DX: CPT

## 2023-01-12 PROCEDURE — 99072 ADDL SUPL MATRL&STAF TM PHE: CPT

## 2023-01-12 PROCEDURE — 99213 OFFICE O/P EST LOW 20 MIN: CPT | Mod: 25

## 2023-01-12 NOTE — END OF VISIT
[FreeTextEntry3] : I, Dr. Kim personally performed the evaluation and management (E/M) services , including all procedures, for this established patient who presents today with (a) new problem(s)/exacerbation of (an) existing condition(s). That E/M includes conducting the clinically appropriate interval history &/or exam, assessing all new/exacerbated conditions, and establishing a new plan of care. Today, my GILBERT, Latricia Mujica, was here to observe &/or participate in the visit & follow plan of care established by me.\par \par \par

## 2023-01-12 NOTE — PHYSICAL EXAM
[Nasal Endoscopy Performed] : nasal endoscopy was performed, see procedure section for findings [] : septum deviated to the right [Midline] : trachea located in midline position [Normal] : no rashes [de-identified] : mild soft tissue swelling over the nasal bridge

## 2023-01-12 NOTE — ASSESSMENT
[FreeTextEntry1] : Patient follows up swelling has dramatically gone down nose actually look straight does not appear that any intervention is necessary endoscopically she does have a deviated septum on the right reassured her she still somewhat tender and some minimal swelling no further acute interventions indicated she will follow-up with her  in February.

## 2023-01-12 NOTE — HISTORY OF PRESENT ILLNESS
[de-identified] : Patient seen last week after a fall. She stopped icing her nose recently and is doing well. She still has a little tenderness over the nasal bridge. SHe still has some mild bruising but is able to cover it with makeup. She is not having any issues breathing through the nose and denies nosebleeds

## 2023-02-02 ENCOUNTER — APPOINTMENT (OUTPATIENT)
Dept: OTOLARYNGOLOGY | Facility: CLINIC | Age: 83
End: 2023-02-02
Payer: MEDICARE

## 2023-02-02 VITALS
WEIGHT: 170 LBS | HEIGHT: 62 IN | TEMPERATURE: 97.8 F | SYSTOLIC BLOOD PRESSURE: 150 MMHG | DIASTOLIC BLOOD PRESSURE: 69 MMHG | HEART RATE: 71 BPM | BODY MASS INDEX: 31.28 KG/M2

## 2023-02-02 DIAGNOSIS — J34.2 DEVIATED NASAL SEPTUM: ICD-10-CM

## 2023-02-02 DIAGNOSIS — R09.81 NASAL CONGESTION: ICD-10-CM

## 2023-02-02 PROCEDURE — 31231 NASAL ENDOSCOPY DX: CPT

## 2023-02-02 PROCEDURE — 99072 ADDL SUPL MATRL&STAF TM PHE: CPT

## 2023-02-02 PROCEDURE — 99214 OFFICE O/P EST MOD 30 MIN: CPT | Mod: 25

## 2023-02-02 RX ORDER — FLUTICASONE PROPIONATE 50 UG/1
50 SPRAY, METERED NASAL DAILY
Qty: 3 | Refills: 3 | Status: ACTIVE | COMMUNITY
Start: 2023-02-02 | End: 1900-01-01

## 2023-02-02 NOTE — ASSESSMENT
[FreeTextEntry1] : She had a recent sinus infection treated with Z-Emil by Dr. Dowd feeling much better here for evaluation ear examination is normal endoscopically she is got some postnasal drip that was suctioned out she is continuing to use the Flonase which is helping her and she is also using Neosporin on occasion in her nose that is helping her as needed.  She will follow-up and see us on an annual basis or sooner if she needs us.

## 2023-02-02 NOTE — HISTORY OF PRESENT ILLNESS
[de-identified] : Patient was treated with an antibiotic a few weeks ago by Dr Steven for a suspected sinusitis. She is feeling fine right now but gets recurrent nasal congestion and runny nose. SHe uses Flonase and saline. \par She does occasionally use neosporin on a Qtip inside her nose for nasal dryness

## 2023-04-07 ENCOUNTER — OFFICE (OUTPATIENT)
Dept: URBAN - METROPOLITAN AREA CLINIC 90 | Facility: CLINIC | Age: 83
Setting detail: OPHTHALMOLOGY
End: 2023-04-07
Payer: COMMERCIAL

## 2023-04-07 DIAGNOSIS — H26.493: ICD-10-CM

## 2023-04-07 DIAGNOSIS — H35.373: ICD-10-CM

## 2023-04-07 DIAGNOSIS — H02.403: ICD-10-CM

## 2023-04-07 DIAGNOSIS — H18.513: ICD-10-CM

## 2023-04-07 DIAGNOSIS — H16.223: ICD-10-CM

## 2023-04-07 DIAGNOSIS — H43.813: ICD-10-CM

## 2023-04-07 DIAGNOSIS — H40.1133: ICD-10-CM

## 2023-04-07 DIAGNOSIS — H17.812: ICD-10-CM

## 2023-04-07 DIAGNOSIS — D31.31: ICD-10-CM

## 2023-04-07 PROCEDURE — 92014 COMPRE OPH EXAM EST PT 1/>: CPT | Performed by: OPHTHALMOLOGY

## 2023-04-07 ASSESSMENT — SUPERFICIAL PUNCTATE KERATITIS (SPK)
OS_SPK: 2+ 3+
OD_SPK: 2+ 3+

## 2023-04-07 ASSESSMENT — REFRACTION_CURRENTRX
OD_VPRISM_DIRECTION: SV
OS_VPRISM_DIRECTION: SV
OD_AXIS: 097
OD_CYLINDER: -1.00
OD_AXIS: 102
OS_OVR_VA: 20/
OS_AXIS: 082
OS_SPHERE: +2.75
OS_AXIS: 073
OD_CYLINDER: -1.25
OD_VPRISM_DIRECTION: SV
OS_OVR_VA: 20/
OS_OVR_VA: 20/
OD_SPHERE: +3.25
OS_CYLINDER: -1.00
OD_AXIS: 106
OD_VPRISM_DIRECTION: SV
OD_OVR_VA: 20/
OS_VPRISM_DIRECTION: SV
OS_SPHERE: +2.75
OS_CYLINDER: -1.00
OS_VPRISM_DIRECTION: SV
OS_CYLINDER: -1.00
OS_AXIS: 069
OD_CYLINDER: -1.25
OD_OVR_VA: 20/
OS_SPHERE: +0.75
OD_SPHERE: +3.00
OD_OVR_VA: 20/
OD_SPHERE: +0.25

## 2023-04-07 ASSESSMENT — SPHEQUIV_DERIVED
OS_SPHEQUIV: -2
OD_SPHEQUIV: -0.375
OD_SPHEQUIV: -0.125
OD_SPHEQUIV: -0.375
OS_SPHEQUIV: 1.5
OS_SPHEQUIV: 1.125
OD_SPHEQUIV: -1

## 2023-04-07 ASSESSMENT — CONFRONTATIONAL VISUAL FIELD TEST (CVF)
OS_FINDINGS: FULL
OD_FINDINGS: FULL

## 2023-04-07 ASSESSMENT — AXIALLENGTH_DERIVED
OD_AL: 23.5032
OD_AL: 23.8465
OS_AL: 23.1094
OS_AL: 24.4963
OD_AL: 23.6003
OD_AL: 23.6003
OS_AL: 23.2504

## 2023-04-07 ASSESSMENT — KERATOMETRY
OS_K1POWER_DIOPTERS: 42.50
OD_K2POWER_DIOPTERS: 44.25
OD_K1POWER_DIOPTERS: 43.50
OS_K2POWER_DIOPTERS: 44.00
OD_AXISANGLE_DEGREES: 007
METHOD_AUTO_MANUAL: AUTO
OS_AXISANGLE_DEGREES: 169

## 2023-04-07 ASSESSMENT — REFRACTION_MANIFEST
OD_ADD: +275
OD_AXIS: 105
OD_ADD: +2.75
OS_VA1: 20/25-1
OS_SPHERE: +1.25
OD_SPHERE: +0.25
OS_VA2: 20/30-
OS_AXIS: 090
OS_CYLINDER: -0.25
OD_VA2: 20/30-
OS_ADD: +2.50
OD_VA1: 20/40-2
OD_AXIS: 105
OS_SPHERE: PLANO
OS_CYLINDER: -1.00
OD_AXIS: 110
OD_VA2: 20/30
OS_AXIS: 50
OD_SPHERE: +0.50
OS_VA2: 20/30
OD_CYLINDER: -0.50
OD_SPHERE: -0.75
OD_ADD: +2.50
OS_SPHERE: +2.00
OD_VA1: 20/30-2
OS_CYLINDER: -0.25
OS_ADD: +275
OD_CYLINDER: -1.25
OD_CYLINDER: -1.75
OS_AXIS: 080
OS_VA1: 20/30-1

## 2023-04-07 ASSESSMENT — REFRACTION_AUTOREFRACTION
OS_AXIS: 077
OS_SPHERE: -1.00
OD_SPHERE: +0.50
OS_CYLINDER: -2.00
OD_AXIS: 097
OD_CYLINDER: -1.25

## 2023-04-07 ASSESSMENT — TONOMETRY
OD_IOP_MMHG: 12
OS_IOP_MMHG: 13

## 2023-04-07 ASSESSMENT — LID POSITION - PTOSIS
OD_PTOSIS: RUL 1+
OS_PTOSIS: LUL T

## 2023-04-07 ASSESSMENT — CORNEAL DYSTROPHY - POSTERIOR
OS_POSTERIORDYSTROPHY: T GUTTATA
OD_POSTERIORDYSTROPHY: T GUTTATA

## 2023-04-07 ASSESSMENT — VISUAL ACUITY
OS_BCVA: 20/30-2
OD_BCVA: 20/30

## 2023-07-25 NOTE — ED ADULT NURSE NOTE - NSSEPSISSUSPECTED_ED_A_ED
Patient Name: Delmis Watson [facility] [place], operated by [facility]   YOB: 1939 [address]   MRN: [id number]    [place] [address]       Date of Service: 7/25/2023    Subjective       Chief Complaint   Patient presents with   • Follow-up   • Refill Request       Follow up visit. Doing well overall. No changes in medications or medical history. Going to HD without complication. Due for labs and refills.     Patient's medications, allergies, past medical, surgical, social and family histories were reviewed and updated as appropriate.    Review of Systems   Constitutional: Negative for chills, fever and unexpected weight change.   HENT: Negative for ear pain and sore throat.    Eyes: Negative for pain and visual disturbance.   Respiratory: Positive for shortness of breath (with exertion, mildly worse over weeks/months). Negative for cough.    Cardiovascular: Negative for chest pain and palpitations.   Gastrointestinal: Negative for abdominal pain.        No change in bowel habits   Genitourinary: Negative for difficulty urinating.   Skin: Negative for rash.       Objective     Vitals:    07/25/23 1333   BP: 136/50   Pulse: (!) 50   Resp: 18   Temp: 96.9 °F (36.1 °C)       Physical Exam  Vitals and nursing note reviewed.   Constitutional:       General: She is not in acute distress.     Appearance: She is well-developed.   HENT:      Head: Normocephalic.   Eyes:      Conjunctiva/sclera: Conjunctivae normal.      Pupils: Pupils are equal, round, and reactive to light.   Neck:      Thyroid: No thyromegaly.   Cardiovascular:      Rate and Rhythm: Normal rate and regular rhythm.      Heart sounds: Murmur heard.   Pulmonary:      Effort: Pulmonary effort is normal.      Breath sounds: Normal breath sounds.   Skin:     General: Skin is warm and dry.      Findings: No rash.         Assessment and Plan       Problem List Items Addressed This Visit         Endocrine and Metabolic    Type 2 diabetes mellitus with chronic kidney disease on chronic dialysis, with long-term current use of insulin (CMD) - Primary    Relevant Orders    Glycohemoglobin       Genitourinary and Reproductive    End stage renal disease (CMD)    Relevant Orders    CBC with Automated Differential    Iron And total Iron Binding Capacity       Hematology and Neoplasia    Anemia due to chronic kidney disease, on chronic dialysis (CMD)    Relevant Orders    CBC with Automated Differential    Iron And total Iron Binding Capacity       Sleep    Primary insomnia     Monitor weight, review dry weight and UF with nephrology, call if worsening FLORES. Refilled temazepam. Otherwise, as above.     Follow-up: 3 months for CHELSEA Mayer,   7/25/2023     No

## 2023-09-15 ENCOUNTER — OFFICE (OUTPATIENT)
Dept: URBAN - METROPOLITAN AREA CLINIC 90 | Facility: CLINIC | Age: 83
Setting detail: OPHTHALMOLOGY
End: 2023-09-15
Payer: COMMERCIAL

## 2023-09-15 DIAGNOSIS — H43.813: ICD-10-CM

## 2023-09-15 DIAGNOSIS — H17.812: ICD-10-CM

## 2023-09-15 DIAGNOSIS — H35.373: ICD-10-CM

## 2023-09-15 DIAGNOSIS — H02.403: ICD-10-CM

## 2023-09-15 DIAGNOSIS — H40.1133: ICD-10-CM

## 2023-09-15 DIAGNOSIS — H16.223: ICD-10-CM

## 2023-09-15 DIAGNOSIS — H18.529: ICD-10-CM

## 2023-09-15 DIAGNOSIS — H26.493: ICD-10-CM

## 2023-09-15 DIAGNOSIS — H18.513: ICD-10-CM

## 2023-09-15 PROCEDURE — 92083 EXTENDED VISUAL FIELD XM: CPT | Performed by: OPHTHALMOLOGY

## 2023-09-15 PROCEDURE — 92014 COMPRE OPH EXAM EST PT 1/>: CPT | Performed by: OPHTHALMOLOGY

## 2023-09-15 PROCEDURE — 92133 CPTRZD OPH DX IMG PST SGM ON: CPT | Performed by: OPHTHALMOLOGY

## 2023-09-15 ASSESSMENT — REFRACTION_CURRENTRX
OS_AXIS: 082
OD_VPRISM_DIRECTION: SV
OD_SPHERE: +3.00
OD_VPRISM_DIRECTION: SV
OS_SPHERE: +2.75
OS_OVR_VA: 20/
OS_VPRISM_DIRECTION: SV
OD_OVR_VA: 20/
OS_SPHERE: +2.75
OD_CYLINDER: -1.25
OD_OVR_VA: 20/
OS_AXIS: 069
OD_AXIS: 106
OS_OVR_VA: 20/
OS_CYLINDER: -1.00
OS_VPRISM_DIRECTION: SV
OS_CYLINDER: -1.00
OS_CYLINDER: -1.00
OS_VPRISM_DIRECTION: SV
OD_SPHERE: +3.25
OD_OVR_VA: 20/
OD_VPRISM_DIRECTION: SV
OS_AXIS: 070
OD_SPHERE: +0.25
OD_CYLINDER: -1.25
OD_AXIS: 095
OD_CYLINDER: -1.00
OS_OVR_VA: 20/
OD_AXIS: 102
OS_SPHERE: +0.75

## 2023-09-15 ASSESSMENT — AXIALLENGTH_DERIVED
OD_AL: 23.5544
OS_AL: 23.2667
OS_AL: 22.8481
OD_AL: 23.5544
OD_AL: 23.5544
OD_AL: 23.7997
OS_AL: 22.9859

## 2023-09-15 ASSESSMENT — KERATOMETRY
OS_K2POWER_DIOPTERS: 44.50
OD_AXISANGLE_DEGREES: 090
OD_K2POWER_DIOPTERS: 44.00
OD_K1POWER_DIOPTERS: 44.00
METHOD_AUTO_MANUAL: AUTO
OS_AXISANGLE_DEGREES: 168
OS_K1POWER_DIOPTERS: 43.50

## 2023-09-15 ASSESSMENT — SPHEQUIV_DERIVED
OS_SPHEQUIV: 1.125
OD_SPHEQUIV: -0.375
OS_SPHEQUIV: 0.375
OD_SPHEQUIV: -0.375
OD_SPHEQUIV: -0.375
OD_SPHEQUIV: -1
OS_SPHEQUIV: 1.5

## 2023-09-15 ASSESSMENT — REFRACTION_MANIFEST
OS_ADD: +275
OD_VA2: 20/30-
OD_VA2: 20/30
OD_SPHERE: -0.75
OD_VA1: 20/30-2
OD_VA1: 20/40-2
OS_VA2: 20/30-
OS_AXIS: 080
OS_CYLINDER: -0.25
OD_AXIS: 105
OD_SPHERE: +0.25
OD_ADD: +275
OS_SPHERE: +2.00
OS_CYLINDER: -0.25
OS_SPHERE: +1.25
OD_SPHERE: +0.50
OD_ADD: +2.75
OS_AXIS: 090
OS_SPHERE: PLANO
OD_AXIS: 110
OS_VA1: 20/25-1
OS_CYLINDER: -1.00
OD_AXIS: 105
OD_CYLINDER: -1.25
OS_ADD: +2.50
OS_VA2: 20/30
OS_AXIS: 50
OS_VA1: 20/30-1
OD_CYLINDER: -0.50
OD_CYLINDER: -1.75
OD_ADD: +2.50

## 2023-09-15 ASSESSMENT — CONFRONTATIONAL VISUAL FIELD TEST (CVF)
OD_FINDINGS: FULL
OS_FINDINGS: FULL

## 2023-09-15 ASSESSMENT — REFRACTION_AUTOREFRACTION
OD_CYLINDER: -1.75
OD_SPHERE: +0.50
OD_AXIS: 096
OS_SPHERE: +2.00
OS_AXIS: 074
OS_CYLINDER: -3.25

## 2023-09-15 ASSESSMENT — CORNEAL DYSTROPHY - POSTERIOR
OS_POSTERIORDYSTROPHY: T GUTTATA
OD_POSTERIORDYSTROPHY: T GUTTATA

## 2023-09-15 ASSESSMENT — TONOMETRY
OS_IOP_MMHG: 10
OD_IOP_MMHG: 10

## 2023-09-15 ASSESSMENT — VISUAL ACUITY
OS_BCVA: 20/40+1
OD_BCVA: 20/25-1+2

## 2023-09-15 ASSESSMENT — SUPERFICIAL PUNCTATE KERATITIS (SPK)
OS_SPK: 2+ 3+
OD_SPK: 2+ 3+

## 2023-09-15 ASSESSMENT — LID POSITION - PTOSIS
OS_PTOSIS: LUL T
OD_PTOSIS: RUL 1+

## 2024-01-19 NOTE — PHYSICAL THERAPY INITIAL EVALUATION ADULT - PATIENT/FAMILY AGREES WITH PLAN
"Nutrition Follow up Note    Nutrition Assessment      Tube feed initiated 1/17. TF stopped at this time, patient now on continuous BiPAP.    Nutrition History:  Food and Nutrient History: NPO     Food Allergies/Intolerances:  None  GI Symptoms: None  Oral Problems: None    Anthropometrics:  Ht: 175.3 cm (5' 9.02\"), Wt: 68.7 kg (151 lb 7.3 oz), BMI: 22.36  IBW/kg (Dietitian Calculated): 75.45 kg        Weight Change:    Daily Weight  01/19/24 : 68.7 kg (151 lb 7.3 oz)  01/03/24 : 75.9 kg (167 lb 5.3 oz)  12/31/23 : 71.4 kg (157 lb 4.8 oz)  12/27/23 : 74.8 kg (165 lb)  12/19/23 : 75.5 kg (166 lb 7.2 oz)  11/29/23 : 74.2 kg (163 lb 8.1 oz)  11/20/23 : 70.3 kg (155 lb)  10/11/23 : 68.9 kg (152 lb)  09/01/23 : 70.3 kg (155 lb)  09/01/23 : 70.3 kg (155 lb)      Nutrition Focused Physical Exam Findings:   Subcutaneous Fat Loss  Orbital Fat Pads: Defer  Buccal Fat Pads: Defer  Triceps: Defer  Ribs: Defer    Muscle Wasting  Temporalis: Defer  Pectoralis (Clavicular Region): Defer  Deltoid/Trapezius: Defer  Interosseous: Defer  Trapezius/Infraspinatus/Supraspinatus (Scapular Region): Defer  Quadriceps: Defer  Gastrocnemius: Defer     Nutrition Significant Labs:  Lab Results   Component Value Date    WBC 12.2 (H) 01/19/2024    HGB 10.4 (L) 01/19/2024    HCT 30.9 (L) 01/19/2024     01/19/2024    CHOL 98 (L) 06/02/2023    TRIG 122 06/02/2023    HDL 35 (L) 06/02/2023    ALT <5 (L) 01/18/2024    AST 36 01/18/2024     (H) 01/19/2024    K 3.7 01/19/2024     (H) 01/19/2024    CREATININE 2.50 (H) 01/19/2024    BUN 52 (H) 01/19/2024    CO2 25 01/19/2024    TSH 7.54 (H) 10/11/2023    PSA 1.8 09/30/2020    INR 2.3 (H) 01/03/2024    HGBA1C 10.7 (H) 12/27/2023    ALBUR 12 03/11/2019       Current Facility-Administered Medications:     acetaminophen (Tylenol) tablet 650 mg, 650 mg, oral, TID, Seema Bailey DO, 650 mg at 01/19/24 0853    acetylcysteine (Mucomyst) 200 mg/mL (20 %) nebulizer solution 600 mg, 3 mL, " nebulization, BID, Barber Sprague MD, 600 mg at 01/19/24 0732    albuterol 2.5 mg /3 mL (0.083 %) nebulizer solution 2.5 mg, 2.5 mg, nebulization, q2h PRN, Seema Bailey DO    amiodarone (Pacerone) tablet 400 mg, 400 mg, oral, BID, Cinda Pedersen MD, 400 mg at 01/19/24 0853    apixaban (Eliquis) tablet 5 mg, 5 mg, oral, BID, Cinda Pedersen MD, 5 mg at 01/19/24 0853    artificial saliva (yerbas-lyt) aerosol,spray 1 mL, 1 mL, mucous membrane, TID, Elizabeth Pearce, APRN-CNP, 1 mL at 01/18/24 2150    atorvastatin (Lipitor) tablet 80 mg, 80 mg, oral, Nightly, Seema Bailey DO, 80 mg at 01/18/24 2152    budesonide (Pulmicort) 0.5 mg/2 mL nebulizer solution 0.5 mg, 0.5 mg, nebulization, BID, Seema Bailey DO, 0.5 mg at 01/19/24 0733    carbidopa-levodopa (Sinemet)  mg per tablet 1 tablet, 1 tablet, nasogastric tube, TID, Silviano Lakhani PA-C, 1 tablet at 01/19/24 0852    clopidogrel (Plavix) tablet 75 mg, 75 mg, oral, Daily, Seema Bailey DO, 75 mg at 01/19/24 0853    cyclobenzaprine (Flexeril) tablet 10 mg, 10 mg, oral, Nightly, Seema Bailey DO, 10 mg at 01/18/24 2152    dapagliflozin propanediol (Farxiga) tablet 10 mg, 10 mg, oral, Daily, Seema Bailey DO, 10 mg at 01/19/24 0854    dextrose 10 % in water (D10W) infusion, 0.3 g/kg/hr, intravenous, Once PRN, Seema Bailey DO    dextrose 50 % injection 25 g, 25 g, intravenous, q15 min PRN, Seema Bailey DO, 25 g at 01/17/24 0751    furosemide (Lasix) injection 40 mg, 40 mg, intravenous, q8h, Chuck Mann MD, 40 mg at 01/19/24 0852    gabapentin (Neurontin) capsule 200 mg, 200 mg, oral, Nightly, Seema Bailey DO, 200 mg at 01/18/24 2153    glucagon (Glucagen) injection 1 mg, 1 mg, intramuscular, q15 min PRN, Seema Bailey DO    insulin glargine (Lantus) injection 10 Units, 10 Units, subcutaneous, Daily, Seema Bailey DO, 10 Units at 01/19/24 0856    insulin lispro (HumaLOG) injection 0-10 Units,  0-10 Units, subcutaneous, q6h, Chuck Mann MD, 6 Units at 01/19/24 0850    ipratropium-albuteroL (Duo-Neb) 0.5-2.5 mg/3 mL nebulizer solution 3 mL, 3 mL, nebulization, TID, Seema Bailey DO, 3 mL at 01/19/24 1153    lactobacillus acidophilus tablet 1 tablet, 1 tablet, oral, BID, Seema Bailey DO, 1 tablet at 01/19/24 0853    levothyroxine (Synthroid, Levoxyl) tablet 25 mcg, 25 mcg, oral, Daily before breakfast, Seema Bailey DO, 25 mcg at 01/19/24 0622    melatonin tablet 5 mg, 5 mg, oral, Nightly PRN, Seema Bailey DO, 5 mg at 01/09/24 2130    methyl salicylate-menthol (Bengay) 15-10 % greaseless cream, , Topical, TID PRN, Yuliya Hood, KIP-CNP, Given at 01/14/24 2030    metroNIDAZOLE (Flagyl) 500 mg in NaCl (iso-os) 100 mL, 500 mg, intravenous, q8h, Seema Bailey DO, Stopped at 01/19/24 1110    mirtazapine (Remeron) tablet 15 mg, 15 mg, oral, Nightly, Seema Bailey DO, 15 mg at 01/18/24 2153    norepinephrine (Levophed) 8 mg in dextrose 5% 250 mL (0.032 mg/mL) infusion (premix), 0.01-3 mcg/kg/min, intravenous, Continuous, Chuck Mann MD, Last Rate: 27.6 mL/hr at 01/19/24 1000, 0.19 mcg/kg/min at 01/19/24 1000    nystatin (Mycostatin) 100,000 unit/gram powder 1 Application, 1 Application, Topical, BID, Seema Bailey DO, 1 Application at 01/19/24 0900    ondansetron (Zofran) injection 4 mg, 4 mg, intravenous, q4h PRN, Seema Bailey DO, 4 mg at 01/11/24 1243    oxygen (O2) therapy, , inhalation, Continuous PRN - O2/gases, Chuck Mann MD    oxygen (O2) therapy, , inhalation, Continuous PRN - O2/gases, Chuck Mann MD    pantoprazole (ProtoNix) EC tablet 40 mg, 40 mg, oral, Daily before breakfast, Seema Bailey DO, 40 mg at 01/19/24 0622    phenoL (Chloraseptic) 1.4 % mouth/throat spray 1 spray, 1 spray, Mouth/Throat, q2h PRN, Elizabeth Pearce, APRN-CNP    QUEtiapine (SEROquel) tablet 25 mg, 25 mg, oral, Nightly, Seema Bailey DO, 25 mg at  01/18/24 2153    sertraline (Zoloft) tablet 100 mg, 100 mg, oral, Daily, Seema Bailey DO, 100 mg at 01/19/24 0854    SITagliptin phosphate (Januvia) tablet 50 mg, 50 mg, oral, Daily, Seema Bailey DO, 50 mg at 01/19/24 0854    sodium chloride (Ocean) 0.65 % nasal spray 1 spray, 1 spray, Each Nostril, TID, Seema Bailey DO, 1 spray at 01/18/24 2150    vancomycin (Firvanq) solution 125 mg, 125 mg, oral, 4x daily, Silviano Lakhani PA-C, 125 mg at 01/19/24 0622    Dietary Orders (From admission, onward)       Start     Ordered    01/17/24 1340  Enteral feeding with NPO NG (nasogastric tube); 55 (Start at 20 mL/hr increase by 10 mL/hr Q4H as tolerated); 150; Every 4 hours  Diet effective now        Question Answer Comment   Tube feeding formula: Glucerna 1.5    Feeding route: NG (nasogastric tube)    Tube feeding continuous rate (mL/hr): 55 Start at 20 mL/hr increase by 10 mL/hr Q4H as tolerated   Tube feeding flush (mL): 150    Flush frequency: Every 4 hours        01/17/24 1347    01/14/24 1224  Oral nutritional supplements  Until discontinued        Comments: chocolate   Question Answer Comment   Deliver with Lunch    Select supplement: Sugar Free Mighty Shake        01/14/24 1223    01/12/24 1738  Oral nutritional supplements  Until discontinued        Question Answer Comment   Deliver with All meals    Select supplement: Ensure Clear        01/12/24 1738    01/10/24 1219  Oral nutritional supplements  Until discontinued        Comments: chocolate   Question Answer Comment   Deliver with All meals    Select supplement: Magic Cup        01/10/24 1218                  Nutrition Support Intake provides: TF stopped.      Estimated Needs:   Estimated Energy Needs  Total Energy Estimated Needs (kCal):  (8701-3588)  Total Estimated Energy Need per Day (kCal/kg):  (25-30)  Method for Estimating Needs: Actual Wt    Estimated Protein Needs  Total Protein Estimated Needs (g):  ()  Total Protein Estimated  Needs (g/kg):  (1.1-1.4)  Method for Estimating Needs: Actual Wt    Estimated Fluid Needs  Total Fluid Estimated Needs (mL):  (1998-2397)  Method for Estimating Needs: 1 mL/kcal        Nutrition Diagnosis   Nutrition Diagnosis:       Nutrition Diagnosis  Patient has Nutrition Diagnosis: Yes  Diagnosis Status (1): Resolved  Nutrition Diagnosis 1: Inadequate energy intake  Related to (1): Decreased ability to consume sufficient energy  As Evidenced by (1): Clear liquid diet  Additional Nutrition Diagnosis: Diagnosis 2  Diagnosis Status (2): Ongoing  Nutrition Diagnosis 2: Inadequate energy intake  Related to (2): Pt doesn't like the texture-modifid diet  As Evidenced by (2): poor PO intake       Nutrition Interventions/Recommendations   Nutrition Interventions and Recommendations:    Nutrition Prescription:  Individualized Nutrition Prescription Provided for : 1998-2397 kcals,  gm protein via Enteral nutrition    Nutrition Interventions:   Food and/or Nutrient Delivery Interventions  Interventions: Enteral intake  Enteral Intake: Other (Comment)  Goal: Resume as able: Glucerna 1.5 goal rate @55 mL/Hr to provide 1980 calories, 109 gm protein, 1002 mL free water    Education Documentation  No documentation found.         Nutrition Monitoring and Evaluation   Monitoring/Evaluation:   Food/Nutrient Related History Monitoring  Monitoring and Evaluation Plan: Enteral and parenteral nutrition intake  Energy Intake: Estimated energy intake  Criteria: Tube feed to provide >/= 75% of estimated needs, pt to tolerate @ goal rate       Time Spent/Follow-up:   Follow Up  Time Spent (min): 25 minutes  Last Date of Nutrition Visit: 01/19/24  Nutrition Follow-Up Needed?: 3-5 days  Follow up Comment: 1/22/24   yes

## 2024-03-01 ENCOUNTER — OFFICE (OUTPATIENT)
Dept: URBAN - METROPOLITAN AREA CLINIC 90 | Facility: CLINIC | Age: 84
Setting detail: OPHTHALMOLOGY
End: 2024-03-01
Payer: MEDICARE

## 2024-03-01 DIAGNOSIS — H43.813: ICD-10-CM

## 2024-03-01 DIAGNOSIS — H02.403: ICD-10-CM

## 2024-03-01 DIAGNOSIS — D31.31: ICD-10-CM

## 2024-03-01 DIAGNOSIS — H18.523: ICD-10-CM

## 2024-03-01 DIAGNOSIS — H17.9: ICD-10-CM

## 2024-03-01 DIAGNOSIS — H40.1133: ICD-10-CM

## 2024-03-01 DIAGNOSIS — H35.373: ICD-10-CM

## 2024-03-01 DIAGNOSIS — H18.513: ICD-10-CM

## 2024-03-01 DIAGNOSIS — H16.223: ICD-10-CM

## 2024-03-01 DIAGNOSIS — H26.493: ICD-10-CM

## 2024-03-01 PROCEDURE — 92014 COMPRE OPH EXAM EST PT 1/>: CPT | Performed by: OPHTHALMOLOGY

## 2024-03-01 PROCEDURE — 92250 FUNDUS PHOTOGRAPHY W/I&R: CPT | Performed by: OPHTHALMOLOGY

## 2024-03-01 ASSESSMENT — REFRACTION_CURRENTRX
OD_CYLINDER: -1.25
OS_SPHERE: +2.75
OD_OVR_VA: 20/
OS_VPRISM_DIRECTION: SV
OS_SPHERE: PLANO
OD_OVR_VA: 20/
OS_AXIS: 071
OS_SPHERE: +0.75
OD_CYLINDER: -1.00
OD_VPRISM_DIRECTION: SV
OD_SPHERE: +0.25
OD_AXIS: 104
OS_OVR_VA: 20/
OD_VPRISM_DIRECTION: SV
OS_CYLINDER: -1.00
OS_CYLINDER: -1.00
OS_VPRISM_DIRECTION: SV
OS_OVR_VA: 20/
OD_AXIS: 102
OS_OVR_VA: 20/
OD_SPHERE: +3.00
OS_AXIS: 070
OD_CYLINDER: -1.25
OD_SPHERE: +3.25
OD_CYLINDER: -1.25
OS_AXIS: 082
OD_SPHERE: +0.25
OD_VPRISM_DIRECTION: SV
OS_SPHERE: +2.75
OS_VPRISM_DIRECTION: SV
OD_AXIS: 095
OD_AXIS: 106
OS_AXIS: 069
OS_CYLINDER: SPHERE
OD_OVR_VA: 20/
OS_CYLINDER: -1.00

## 2024-03-01 ASSESSMENT — REFRACTION_MANIFEST
OS_CYLINDER: -0.25
OD_AXIS: 110
OS_ADD: +275
OD_SPHERE: +0.50
OD_ADD: +275
OD_SPHERE: +0.25
OD_ADD: +2.50
OS_AXIS: 50
OS_AXIS: 080
OD_VA1: 20/30-2
OS_VA2: 20/30-
OD_CYLINDER: -1.25
OD_VA1: 20/40-2
OS_SPHERE: +2.00
OS_ADD: +2.50
OS_VA1: 20/25-1
OS_AXIS: 090
OS_CYLINDER: -0.25
OS_CYLINDER: -1.00
OS_SPHERE: +1.25
OS_SPHERE: PLANO
OS_VA2: 20/30
OD_AXIS: 105
OD_VA2: 20/30-
OD_ADD: +2.75
OD_SPHERE: -0.75
OD_CYLINDER: -0.50
OS_VA1: 20/30-1
OD_VA2: 20/30
OD_AXIS: 105
OD_CYLINDER: -1.75

## 2024-03-01 ASSESSMENT — SPHEQUIV_DERIVED
OS_SPHEQUIV: 1.5
OD_SPHEQUIV: -1
OS_SPHEQUIV: 1.125
OD_SPHEQUIV: -0.375
OD_SPHEQUIV: -0.375

## 2024-03-01 ASSESSMENT — LID POSITION - PTOSIS
OS_PTOSIS: LUL T
OD_PTOSIS: RUL 1+
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
## 2024-10-02 ENCOUNTER — OFFICE (OUTPATIENT)
Age: 84
Setting detail: OPHTHALMOLOGY
End: 2024-10-02
Payer: MEDICARE

## 2024-10-02 DIAGNOSIS — H16.223: ICD-10-CM

## 2024-10-02 DIAGNOSIS — H40.1133: ICD-10-CM

## 2024-10-02 DIAGNOSIS — H26.493: ICD-10-CM

## 2024-10-02 DIAGNOSIS — H43.813: ICD-10-CM

## 2024-10-02 DIAGNOSIS — D31.31: ICD-10-CM

## 2024-10-02 DIAGNOSIS — H17.9: ICD-10-CM

## 2024-10-02 DIAGNOSIS — H18.523: ICD-10-CM

## 2024-10-02 DIAGNOSIS — H02.403: ICD-10-CM

## 2024-10-02 DIAGNOSIS — H18.599: ICD-10-CM

## 2024-10-02 DIAGNOSIS — H35.373: ICD-10-CM

## 2024-10-02 DIAGNOSIS — H18.513: ICD-10-CM

## 2024-10-02 PROCEDURE — 92083 EXTENDED VISUAL FIELD XM: CPT | Performed by: OPHTHALMOLOGY

## 2024-10-02 PROCEDURE — 92133 CPTRZD OPH DX IMG PST SGM ON: CPT | Performed by: OPHTHALMOLOGY

## 2024-10-02 PROCEDURE — 92012 INTRM OPH EXAM EST PATIENT: CPT | Performed by: OPHTHALMOLOGY

## 2024-10-02 ASSESSMENT — REFRACTION_CURRENTRX
OS_AXIS: 006
OD_SPHERE: +0.25
OD_CYLINDER: -1.25
OD_VPRISM_DIRECTION: SV
OD_SPHERE: +3.25
OS_AXIS: 069
OD_VPRISM_DIRECTION: SV
OS_AXIS: 071
OS_SPHERE: PLANO
OS_AXIS: 070
OD_OVR_VA: 20/
OS_CYLINDER: SPHERE
OS_SPHERE: +0.75
OS_CYLINDER: -1.00
OD_AXIS: 102
OD_CYLINDER: -1.25
OD_AXIS: 078
OD_OVR_VA: 20/
OD_CYLINDER: -1.25
OS_SPHERE: PLANO
OD_SPHERE: +0.25
OD_AXIS: 106
OS_CYLINDER: -1.00
OS_SPHERE: +2.75
OD_SPHERE: +3.00
OD_CYLINDER: -1.00
OS_VPRISM_DIRECTION: SV
OD_AXIS: 108
OS_OVR_VA: 20/
OS_CYLINDER: -1.25
OS_VPRISM_DIRECTION: SV
OS_CYLINDER: -1.00
OS_VPRISM_DIRECTION: SV
OD_SPHERE: +3.00
OD_SPHERE: +0.25
OD_CYLINDER: -1.00
OS_SPHERE: +2.75
OD_CYLINDER: -1.25
OS_OVR_VA: 20/
OD_AXIS: 104
OS_CYLINDER: -1.00
OS_SPHERE: +3.25
OD_OVR_VA: 20/
OD_AXIS: 095
OS_AXIS: 102
OS_OVR_VA: 20/
OD_VPRISM_DIRECTION: SV
OS_AXIS: 082
OS_ADD: +7.00

## 2024-10-02 ASSESSMENT — REFRACTION_MANIFEST
OS_ADD: +275
OD_ADD: +2.50
OD_AXIS: 105
OD_AXIS: 105
OS_SPHERE: PLANO
OS_CYLINDER: -1.00
OD_CYLINDER: -1.75
OS_CYLINDER: -0.25
OS_VA1: 20/25-1
OS_SPHERE: +1.25
OD_ADD: +275
OS_AXIS: 090
OD_AXIS: 110
OD_ADD: +2.75
OD_SPHERE: +0.25
OS_ADD: +2.50
OS_CYLINDER: -0.25
OD_CYLINDER: -1.25
OD_CYLINDER: -0.50
OD_VA1: 20/40-2
OS_VA1: 20/30-1
OD_SPHERE: -0.75
OS_AXIS: 50
OS_VA2: 20/30
OS_AXIS: 080
OD_VA2: 20/30-
OD_VA2: 20/30
OD_SPHERE: +0.50
OS_VA2: 20/30-
OS_SPHERE: +2.00
OD_VA1: 20/30-2

## 2024-10-02 ASSESSMENT — SUPERFICIAL PUNCTATE KERATITIS (SPK)
OS_SPK: 2+ 3+
OD_SPK: 2+ 3+

## 2024-10-02 ASSESSMENT — LID POSITION - PTOSIS
OD_PTOSIS: RUL 1+
OS_PTOSIS: LUL T

## 2024-10-02 ASSESSMENT — KERATOMETRY
METHOD_AUTO_MANUAL: AUTO
OS_AXISANGLE_DEGREES: 168
OD_K1POWER_DIOPTERS: 43.50
OS_K1POWER_DIOPTERS: UNABLE
OD_AXISANGLE_DEGREES: 178
OS_K2POWER_DIOPTERS: 44.50
OD_K2POWER_DIOPTERS: 44.25

## 2024-10-02 ASSESSMENT — TONOMETRY
OS_IOP_MMHG: 13
OD_IOP_MMHG: 16

## 2024-10-02 ASSESSMENT — REFRACTION_AUTOREFRACTION
OS_AXIS: 069
OD_AXIS: 094
OD_CYLINDER: -2.00
OS_SPHERE: +2.25
OD_SPHERE: +0.75
OS_CYLINDER: -3.75

## 2024-10-02 ASSESSMENT — VISUAL ACUITY
OD_BCVA: 20/25
OS_BCVA: 20/30-1

## 2024-10-02 ASSESSMENT — DRY EYES - PHYSICIAN NOTES
OS_GENERALCOMMENTS: DIFFUSE
OD_GENERALCOMMENTS: DIFFUSE

## 2024-10-02 ASSESSMENT — CONFRONTATIONAL VISUAL FIELD TEST (CVF)
OS_FINDINGS: FULL
OD_FINDINGS: FULL

## 2024-10-02 ASSESSMENT — CORNEAL DYSTROPHY - POSTERIOR
OS_POSTERIORDYSTROPHY: T GUTTATA
OD_POSTERIORDYSTROPHY: T GUTTATA

## 2024-11-13 ENCOUNTER — RX ONLY (RX ONLY)
Age: 84
End: 2024-11-13

## 2024-11-13 ENCOUNTER — OFFICE (OUTPATIENT)
Age: 84
Setting detail: OPHTHALMOLOGY
End: 2024-11-13
Payer: MEDICARE

## 2024-11-13 DIAGNOSIS — H02.403: ICD-10-CM

## 2024-11-13 DIAGNOSIS — H18.513: ICD-10-CM

## 2024-11-13 DIAGNOSIS — H16.223: ICD-10-CM

## 2024-11-13 DIAGNOSIS — H17.9: ICD-10-CM

## 2024-11-13 DIAGNOSIS — H18.523: ICD-10-CM

## 2024-11-13 DIAGNOSIS — H18.599: ICD-10-CM

## 2024-11-13 DIAGNOSIS — H40.1133: ICD-10-CM

## 2024-11-13 DIAGNOSIS — H26.493: ICD-10-CM

## 2024-11-13 PROCEDURE — 92012 INTRM OPH EXAM EST PATIENT: CPT | Performed by: OPHTHALMOLOGY

## 2024-11-13 ASSESSMENT — REFRACTION_CURRENTRX
OS_ADD: +7.00
OD_VPRISM_DIRECTION: SV
OD_OVR_VA: 20/
OD_OVR_VA: 20/
OD_AXIS: 102
OD_SPHERE: +3.00
OS_AXIS: 070
OD_SPHERE: +3.00
OD_AXIS: 078
OS_VPRISM_DIRECTION: SV
OD_AXIS: 108
OS_OVR_VA: 20/
OS_SPHERE: +2.75
OD_CYLINDER: -1.25
OD_AXIS: 106
OS_OVR_VA: 20/
OS_SPHERE: +3.25
OS_AXIS: 006
OS_VPRISM_DIRECTION: SV
OD_SPHERE: +0.25
OD_OVR_VA: 20/
OS_CYLINDER: -1.00
OS_SPHERE: +2.75
OS_SPHERE: PLANO
OS_CYLINDER: -1.25
OS_SPHERE: +0.75
OS_CYLINDER: -1.00
OS_AXIS: 102
OD_CYLINDER: -1.00
OS_CYLINDER: -1.00
OD_SPHERE: +0.25
OD_CYLINDER: -1.25
OS_OVR_VA: 20/
OS_AXIS: 071
OS_AXIS: 082
OD_SPHERE: +0.25
OD_CYLINDER: -1.25
OS_SPHERE: PLANO
OD_SPHERE: +3.25
OD_CYLINDER: -1.00
OD_AXIS: 104
OD_VPRISM_DIRECTION: SV
OS_AXIS: 069
OD_VPRISM_DIRECTION: SV
OD_CYLINDER: -1.25
OS_CYLINDER: -1.00
OD_AXIS: 095
OS_CYLINDER: SPHERE
OS_VPRISM_DIRECTION: SV

## 2024-11-13 ASSESSMENT — CONFRONTATIONAL VISUAL FIELD TEST (CVF)
OS_FINDINGS: FULL
OD_FINDINGS: FULL

## 2024-11-13 ASSESSMENT — REFRACTION_MANIFEST
OD_SPHERE: +0.25
OD_SPHERE: +0.50
OD_SPHERE: -0.75
OD_ADD: +275
OD_CYLINDER: -0.50
OS_AXIS: 080
OS_VA2: 20/30
OS_SPHERE: +1.25
OS_CYLINDER: -0.25
OD_VA1: 20/30-2
OS_ADD: +275
OD_CYLINDER: -1.25
OS_SPHERE: +2.00
OD_VA2: 20/30
OS_AXIS: 50
OS_CYLINDER: -0.25
OD_AXIS: 105
OS_CYLINDER: -1.00
OD_CYLINDER: -1.75
OD_AXIS: 105
OD_AXIS: 110
OD_VA1: 20/40-2
OS_SPHERE: PLANO
OD_VA2: 20/30-
OD_ADD: +2.50
OS_VA1: 20/30-1
OD_ADD: +2.75
OS_AXIS: 090
OS_ADD: +2.50
OS_VA1: 20/25-1
OS_VA2: 20/30-

## 2024-11-13 ASSESSMENT — REFRACTION_AUTOREFRACTION
OS_CYLINDER: -3.75
OD_AXIS: 094
OD_SPHERE: +0.75
OD_CYLINDER: -2.00
OS_SPHERE: +2.25
OS_AXIS: 069

## 2024-11-13 ASSESSMENT — LID POSITION - PTOSIS
OD_PTOSIS: RUL 1+
OS_PTOSIS: LUL T

## 2024-11-13 ASSESSMENT — TEAR BREAK UP TIME (TBUT)
OS_TBUT: T
OD_TBUT: T

## 2024-11-13 ASSESSMENT — CORNEAL DYSTROPHY - POSTERIOR
OD_POSTERIORDYSTROPHY: T GUTTATA
OS_POSTERIORDYSTROPHY: T GUTTATA

## 2024-11-13 ASSESSMENT — KERATOMETRY
OS_AXISANGLE_DEGREES: 168
OD_AXISANGLE_DEGREES: 178
OD_K1POWER_DIOPTERS: 43.50
OD_K2POWER_DIOPTERS: 44.25
OS_K2POWER_DIOPTERS: 44.50
METHOD_AUTO_MANUAL: AUTO
OS_K1POWER_DIOPTERS: UNABLE

## 2024-11-13 ASSESSMENT — VISUAL ACUITY
OD_BCVA: 20/25
OS_BCVA: 20/25+2

## 2024-11-13 ASSESSMENT — SUPERFICIAL PUNCTATE KERATITIS (SPK)
OS_SPK: 2+
OD_SPK: 2+

## 2024-11-13 ASSESSMENT — DRY EYES - PHYSICIAN NOTES
OS_GENERALCOMMENTS: DIFFUSE
OD_GENERALCOMMENTS: DIFFUSE

## 2024-11-15 NOTE — ASU DISCHARGE PLAN (ADULT/PEDIATRIC) - CARE PROVIDER_API CALL
Exercise stress test and echo on 12-5-24 at 1:00 pm  Follow-up in 3 months   
Balta Pichardo (MD)  Ophthalmology  [address], [address]  [place] [address]  Phone: [phone]  Fax: [phone]  Follow Up Time:

## 2025-03-21 ENCOUNTER — OFFICE (OUTPATIENT)
Facility: LOCATION | Age: 85
Setting detail: OPHTHALMOLOGY
End: 2025-03-21
Payer: MEDICARE

## 2025-03-21 ENCOUNTER — RX ONLY (RX ONLY)
Age: 85
End: 2025-03-21

## 2025-03-21 DIAGNOSIS — H17.9: ICD-10-CM

## 2025-03-21 DIAGNOSIS — H18.593: ICD-10-CM

## 2025-03-21 DIAGNOSIS — H18.523: ICD-10-CM

## 2025-03-21 DIAGNOSIS — D31.31: ICD-10-CM

## 2025-03-21 DIAGNOSIS — H40.1133: ICD-10-CM

## 2025-03-21 DIAGNOSIS — H18.592: ICD-10-CM

## 2025-03-21 DIAGNOSIS — H16.223: ICD-10-CM

## 2025-03-21 DIAGNOSIS — H02.403: ICD-10-CM

## 2025-03-21 DIAGNOSIS — H18.513: ICD-10-CM

## 2025-03-21 DIAGNOSIS — H26.493: ICD-10-CM

## 2025-03-21 DIAGNOSIS — H18.591: ICD-10-CM

## 2025-03-21 PROCEDURE — 92012 INTRM OPH EXAM EST PATIENT: CPT | Performed by: OPHTHALMOLOGY

## 2025-03-21 ASSESSMENT — REFRACTION_CURRENTRX
OS_AXIS: 006
OD_CYLINDER: -1.25
OD_VPRISM_DIRECTION: SV
OD_SPHERE: +3.00
OS_CYLINDER: SPHERE
OD_CYLINDER: -1.25
OD_CYLINDER: -1.25
OD_CYLINDER: -1.00
OD_SPHERE: +0.25
OS_SPHERE: +2.75
OD_AXIS: 104
OS_SPHERE: PLANO
OS_SPHERE: PLANO
OS_SPHERE: +3.25
OD_CYLINDER: -1.00
OS_AXIS: 071
OD_VPRISM_DIRECTION: SV
OS_VPRISM_DIRECTION: SV
OD_SPHERE: +0.25
OD_AXIS: 078
OS_SPHERE: +0.75
OD_AXIS: 102
OS_VPRISM_DIRECTION: SV
OS_CYLINDER: -1.00
OS_OVR_VA: 20/
OS_SPHERE: +2.75
OS_CYLINDER: -1.00
OS_OVR_VA: 20/
OS_AXIS: 082
OS_OVR_VA: 20/
OS_AXIS: 102
OS_CYLINDER: -1.00
OD_AXIS: 108
OD_SPHERE: +0.25
OS_CYLINDER: -1.25
OD_AXIS: 095
OD_CYLINDER: -1.25
OS_AXIS: 069
OD_AXIS: 106
OD_OVR_VA: 20/
OD_SPHERE: +3.25
OD_VPRISM_DIRECTION: SV
OD_OVR_VA: 20/
OS_VPRISM_DIRECTION: SV
OS_ADD: +7.00
OS_CYLINDER: -1.00
OS_AXIS: 070
OD_OVR_VA: 20/
OD_SPHERE: +3.00

## 2025-03-21 ASSESSMENT — CORNEAL DYSTROPHY - POSTERIOR
OS_POSTERIORDYSTROPHY: T GUTTATA
OD_POSTERIORDYSTROPHY: T GUTTATA

## 2025-03-21 ASSESSMENT — REFRACTION_AUTOREFRACTION
OD_AXIS: 094
OD_SPHERE: +0.75
OS_SPHERE: +1.75
OS_CYLINDER: -3.00
OD_CYLINDER: -2.00
OS_AXIS: 070

## 2025-03-21 ASSESSMENT — TONOMETRY
OD_IOP_MMHG: 12
OD_IOP_MMHG: 13
OS_IOP_MMHG: 13
OS_IOP_MMHG: 13

## 2025-03-21 ASSESSMENT — VISUAL ACUITY
OS_BCVA: 20/30-1
OD_BCVA: 20/30-2

## 2025-03-21 ASSESSMENT — REFRACTION_MANIFEST
OD_SPHERE: +0.50
OS_CYLINDER: -1.00
OD_CYLINDER: -1.25
OD_VA1: 20/30-2
OS_AXIS: 090
OS_AXIS: 50
OS_SPHERE: +2.00
OS_ADD: +275
OS_CYLINDER: -0.25
OS_SPHERE: PLANO
OD_VA1: 20/40-2
OD_AXIS: 110
OD_AXIS: 105
OS_VA2: 20/30
OD_ADD: +275
OS_VA1: 20/30-1
OD_ADD: +2.50
OS_CYLINDER: -0.25
OD_AXIS: 105
OS_SPHERE: +1.25
OD_SPHERE: +0.25
OD_ADD: +2.75
OD_VA2: 20/30
OD_SPHERE: -0.75
OS_VA2: 20/30-
OS_AXIS: 080
OD_VA2: 20/30-
OD_CYLINDER: -0.50
OD_CYLINDER: -1.75
OS_VA1: 20/25-1
OS_ADD: +2.50

## 2025-03-21 ASSESSMENT — DRY EYES - PHYSICIAN NOTES
OS_GENERALCOMMENTS: DIFFUSE
OD_GENERALCOMMENTS: DIFFUSE

## 2025-03-21 ASSESSMENT — LID POSITION - PTOSIS
OS_PTOSIS: LUL T
OD_PTOSIS: RUL 1+

## 2025-03-21 ASSESSMENT — SUPERFICIAL PUNCTATE KERATITIS (SPK)
OS_SPK: 2+
OD_SPK: 2+

## 2025-03-21 ASSESSMENT — TEAR BREAK UP TIME (TBUT)
OS_TBUT: T
OD_TBUT: T

## 2025-03-21 ASSESSMENT — KERATOMETRY
OS_K2POWER_DIOPTERS: 44.00
OS_AXISANGLE_DEGREES: 153
OD_AXISANGLE_DEGREES: 003
OD_K1POWER_DIOPTERS: 42.50
METHOD_AUTO_MANUAL: AUTO
OS_K1POWER_DIOPTERS: 41.00
OD_K2POWER_DIOPTERS: 44.00

## 2025-03-21 ASSESSMENT — CONFRONTATIONAL VISUAL FIELD TEST (CVF)
OD_FINDINGS: FULL
OS_FINDINGS: FULL